# Patient Record
Sex: FEMALE | Race: WHITE | NOT HISPANIC OR LATINO | URBAN - METROPOLITAN AREA
[De-identification: names, ages, dates, MRNs, and addresses within clinical notes are randomized per-mention and may not be internally consistent; named-entity substitution may affect disease eponyms.]

---

## 2017-09-15 ENCOUNTER — FOLLOW UP ESTABLISHED (OUTPATIENT)
Dept: URBAN - METROPOLITAN AREA CLINIC 51 | Facility: CLINIC | Age: 82
End: 2017-09-15
Payer: COMMERCIAL

## 2017-09-15 DIAGNOSIS — H43.393 OTHER VITREOUS OPACITIES, BILATERAL: ICD-10-CM

## 2017-09-15 DIAGNOSIS — H35.3131 NONEXUDATIVE MACULAR DEGENERATION, EARLY DRY STAGE, BILATERAL: ICD-10-CM

## 2017-09-15 PROCEDURE — 92014 COMPRE OPH EXAM EST PT 1/>: CPT | Performed by: OPTOMETRIST

## 2017-09-15 PROCEDURE — 92134 CPTRZ OPH DX IMG PST SGM RTA: CPT | Performed by: OPTOMETRIST

## 2017-09-15 ASSESSMENT — VISUAL ACUITY
OS: 20/20
OD: 20/50

## 2017-09-15 ASSESSMENT — KERATOMETRY
OS: 44.01
OD: 43.92

## 2017-09-15 ASSESSMENT — INTRAOCULAR PRESSURE
OD: 15
OS: 17

## 2017-10-25 ENCOUNTER — Encounter (OUTPATIENT)
Dept: URBAN - METROPOLITAN AREA CLINIC 51 | Facility: CLINIC | Age: 82
End: 2017-10-25
Payer: COMMERCIAL

## 2017-10-25 DIAGNOSIS — Z01.818 ENCOUNTER FOR OTHER PREPROCEDURAL EXAMINATION: Primary | ICD-10-CM

## 2017-10-25 DIAGNOSIS — H26.491 OTHER SECONDARY CATARACT, RIGHT EYE: ICD-10-CM

## 2017-10-25 PROCEDURE — 99213 OFFICE O/P EST LOW 20 MIN: CPT | Performed by: PHYSICIAN ASSISTANT

## 2017-10-31 ENCOUNTER — SURGERY (OUTPATIENT)
Dept: URBAN - METROPOLITAN AREA SURGERY 19 | Facility: SURGERY | Age: 82
End: 2017-10-31
Payer: COMMERCIAL

## 2017-10-31 PROCEDURE — 66821 AFTER CATARACT LASER SURGERY: CPT | Performed by: OPHTHALMOLOGY

## 2022-11-28 ENCOUNTER — INPATIENT (INPATIENT)
Facility: HOSPITAL | Age: 87
LOS: 1 days | Discharge: HOME | DRG: 683 | End: 2022-11-30
Attending: HOSPITALIST | Admitting: HOSPITALIST
Payer: MEDICARE

## 2022-11-28 VITALS
HEART RATE: 86 BPM | WEIGHT: 115.96 LBS | TEMPERATURE: 97 F | DIASTOLIC BLOOD PRESSURE: 52 MMHG | RESPIRATION RATE: 16 BRPM | SYSTOLIC BLOOD PRESSURE: 114 MMHG | OXYGEN SATURATION: 95 %

## 2022-11-28 DIAGNOSIS — N17.9 ACUTE KIDNEY FAILURE, UNSPECIFIED: ICD-10-CM

## 2022-11-28 LAB
ALBUMIN SERPL ELPH-MCNC: 2.7 G/DL — LOW (ref 3.3–5)
ALP SERPL-CCNC: 78 U/L — SIGNIFICANT CHANGE UP (ref 40–120)
ALT FLD-CCNC: <6 U/L — LOW (ref 10–45)
ANION GAP SERPL CALC-SCNC: 8 MMOL/L — SIGNIFICANT CHANGE UP (ref 5–17)
APPEARANCE UR: ABNORMAL
APTT BLD: 31.9 SEC — SIGNIFICANT CHANGE UP (ref 27.5–35.5)
AST SERPL-CCNC: 14 U/L — SIGNIFICANT CHANGE UP (ref 10–40)
BACTERIA # UR AUTO: ABNORMAL /HPF
BASOPHILS # BLD AUTO: 0.02 K/UL — SIGNIFICANT CHANGE UP (ref 0–0.2)
BASOPHILS NFR BLD AUTO: 0.2 % — SIGNIFICANT CHANGE UP (ref 0–2)
BILIRUB SERPL-MCNC: 0.4 MG/DL — SIGNIFICANT CHANGE UP (ref 0.2–1.2)
BILIRUB UR-MCNC: NEGATIVE — SIGNIFICANT CHANGE UP
BUN SERPL-MCNC: 76 MG/DL — HIGH (ref 7–23)
CALCIUM SERPL-MCNC: 10.4 MG/DL — SIGNIFICANT CHANGE UP (ref 8.4–10.5)
CHLORIDE SERPL-SCNC: 98 MMOL/L — SIGNIFICANT CHANGE UP (ref 96–108)
CO2 SERPL-SCNC: 32 MMOL/L — HIGH (ref 22–31)
COLOR SPEC: YELLOW — SIGNIFICANT CHANGE UP
CREAT SERPL-MCNC: 1.61 MG/DL — HIGH (ref 0.5–1.3)
DIFF PNL FLD: ABNORMAL
EGFR: 31 ML/MIN/1.73M2 — LOW
EOSINOPHIL # BLD AUTO: 0.05 K/UL — SIGNIFICANT CHANGE UP (ref 0–0.5)
EOSINOPHIL NFR BLD AUTO: 0.5 % — SIGNIFICANT CHANGE UP (ref 0–6)
EPI CELLS # UR: SIGNIFICANT CHANGE UP
GLUCOSE SERPL-MCNC: 109 MG/DL — HIGH (ref 70–99)
GLUCOSE UR QL: NEGATIVE — SIGNIFICANT CHANGE UP
HCT VFR BLD CALC: 29 % — LOW (ref 34.5–45)
HGB BLD-MCNC: 8.6 G/DL — LOW (ref 11.5–15.5)
IMM GRANULOCYTES NFR BLD AUTO: 0.9 % — SIGNIFICANT CHANGE UP (ref 0–0.9)
INR BLD: 1.69 RATIO — HIGH (ref 0.88–1.16)
KETONES UR-MCNC: NEGATIVE — SIGNIFICANT CHANGE UP
LACTATE SERPL-SCNC: 0.9 MMOL/L — SIGNIFICANT CHANGE UP (ref 0.7–2)
LEUKOCYTE ESTERASE UR-ACNC: ABNORMAL
LYMPHOCYTES # BLD AUTO: 1.41 K/UL — SIGNIFICANT CHANGE UP (ref 1–3.3)
LYMPHOCYTES # BLD AUTO: 14.6 % — SIGNIFICANT CHANGE UP (ref 13–44)
MCHC RBC-ENTMCNC: 27.4 PG — SIGNIFICANT CHANGE UP (ref 27–34)
MCHC RBC-ENTMCNC: 29.7 GM/DL — LOW (ref 32–36)
MCV RBC AUTO: 92.4 FL — SIGNIFICANT CHANGE UP (ref 80–100)
MONOCYTES # BLD AUTO: 0.58 K/UL — SIGNIFICANT CHANGE UP (ref 0–0.9)
MONOCYTES NFR BLD AUTO: 6 % — SIGNIFICANT CHANGE UP (ref 2–14)
NEUTROPHILS # BLD AUTO: 7.52 K/UL — HIGH (ref 1.8–7.4)
NEUTROPHILS NFR BLD AUTO: 77.8 % — HIGH (ref 43–77)
NITRITE UR-MCNC: NEGATIVE — SIGNIFICANT CHANGE UP
NRBC # BLD: 0 /100 WBCS — SIGNIFICANT CHANGE UP (ref 0–0)
OB PNL STL: NEGATIVE — SIGNIFICANT CHANGE UP
PH UR: 7 — SIGNIFICANT CHANGE UP (ref 5–8)
PLATELET # BLD AUTO: 292 K/UL — SIGNIFICANT CHANGE UP (ref 150–400)
POTASSIUM SERPL-MCNC: 3.2 MMOL/L — LOW (ref 3.5–5.3)
POTASSIUM SERPL-SCNC: 3.2 MMOL/L — LOW (ref 3.5–5.3)
PROT SERPL-MCNC: 7.1 G/DL — SIGNIFICANT CHANGE UP (ref 6–8.3)
PROT UR-MCNC: 30 MG/DL
PROTHROM AB SERPL-ACNC: 19.7 SEC — HIGH (ref 10.5–13.4)
RBC # BLD: 3.14 M/UL — LOW (ref 3.8–5.2)
RBC # FLD: 15.9 % — HIGH (ref 10.3–14.5)
RBC CASTS # UR COMP ASSIST: ABNORMAL /HPF (ref 0–4)
SARS-COV-2 RNA SPEC QL NAA+PROBE: SIGNIFICANT CHANGE UP
SODIUM SERPL-SCNC: 138 MMOL/L — SIGNIFICANT CHANGE UP (ref 135–145)
SP GR SPEC: 1 — LOW (ref 1.01–1.02)
UROBILINOGEN FLD QL: NEGATIVE — SIGNIFICANT CHANGE UP
WBC # BLD: 9.67 K/UL — SIGNIFICANT CHANGE UP (ref 3.8–10.5)
WBC # FLD AUTO: 9.67 K/UL — SIGNIFICANT CHANGE UP (ref 3.8–10.5)
WBC UR QL: ABNORMAL /HPF (ref 0–5)

## 2022-11-28 PROCEDURE — 99285 EMERGENCY DEPT VISIT HI MDM: CPT

## 2022-11-28 PROCEDURE — 73630 X-RAY EXAM OF FOOT: CPT | Mod: 26,RT

## 2022-11-28 PROCEDURE — 71045 X-RAY EXAM CHEST 1 VIEW: CPT | Mod: 26

## 2022-11-28 PROCEDURE — 93010 ELECTROCARDIOGRAM REPORT: CPT

## 2022-11-28 PROCEDURE — 99223 1ST HOSP IP/OBS HIGH 75: CPT

## 2022-11-28 RX ORDER — ONDANSETRON 8 MG/1
4 TABLET, FILM COATED ORAL EVERY 8 HOURS
Refills: 0 | Status: DISCONTINUED | OUTPATIENT
Start: 2022-11-28 | End: 2022-11-30

## 2022-11-28 RX ORDER — ACETAMINOPHEN 500 MG
650 TABLET ORAL EVERY 6 HOURS
Refills: 0 | Status: DISCONTINUED | OUTPATIENT
Start: 2022-11-28 | End: 2022-11-30

## 2022-11-28 RX ORDER — ALLOPURINOL 300 MG
0 TABLET ORAL
Qty: 0 | Refills: 0 | DISCHARGE

## 2022-11-28 RX ORDER — VANCOMYCIN HCL 1 G
1000 VIAL (EA) INTRAVENOUS ONCE
Refills: 0 | Status: COMPLETED | OUTPATIENT
Start: 2022-11-28 | End: 2022-11-28

## 2022-11-28 RX ORDER — VANCOMYCIN HCL 1 G
500 VIAL (EA) INTRAVENOUS EVERY 24 HOURS
Refills: 0 | Status: DISCONTINUED | OUTPATIENT
Start: 2022-11-28 | End: 2022-11-29

## 2022-11-28 RX ORDER — METOLAZONE 5 MG/1
1 TABLET ORAL
Qty: 0 | Refills: 0 | DISCHARGE

## 2022-11-28 RX ORDER — POLYETHYLENE GLYCOL 3350 17 G/17G
17 POWDER, FOR SOLUTION ORAL DAILY
Refills: 0 | Status: DISCONTINUED | OUTPATIENT
Start: 2022-11-28 | End: 2022-11-30

## 2022-11-28 RX ORDER — LANOLIN ALCOHOL/MO/W.PET/CERES
3 CREAM (GRAM) TOPICAL AT BEDTIME
Refills: 0 | Status: DISCONTINUED | OUTPATIENT
Start: 2022-11-28 | End: 2022-11-30

## 2022-11-28 RX ORDER — ATENOLOL 25 MG/1
50 TABLET ORAL DAILY
Refills: 0 | Status: DISCONTINUED | OUTPATIENT
Start: 2022-11-28 | End: 2022-11-30

## 2022-11-28 RX ORDER — POTASSIUM CHLORIDE 20 MEQ
0 PACKET (EA) ORAL
Qty: 0 | Refills: 0 | DISCHARGE

## 2022-11-28 RX ORDER — ZINC SULFATE TAB 220 MG (50 MG ZINC EQUIVALENT) 220 (50 ZN) MG
220 TAB ORAL DAILY
Refills: 0 | Status: DISCONTINUED | OUTPATIENT
Start: 2022-11-28 | End: 2022-11-30

## 2022-11-28 RX ORDER — CEFEPIME 1 G/1
500 INJECTION, POWDER, FOR SOLUTION INTRAMUSCULAR; INTRAVENOUS EVERY 12 HOURS
Refills: 0 | Status: DISCONTINUED | OUTPATIENT
Start: 2022-11-28 | End: 2022-11-30

## 2022-11-28 RX ORDER — POTASSIUM CHLORIDE 20 MEQ
40 PACKET (EA) ORAL ONCE
Refills: 0 | Status: COMPLETED | OUTPATIENT
Start: 2022-11-28 | End: 2022-11-28

## 2022-11-28 RX ORDER — APIXABAN 2.5 MG/1
2.5 TABLET, FILM COATED ORAL
Refills: 0 | Status: DISCONTINUED | OUTPATIENT
Start: 2022-11-28 | End: 2022-11-30

## 2022-11-28 RX ORDER — ASCORBIC ACID 60 MG
500 TABLET,CHEWABLE ORAL DAILY
Refills: 0 | Status: DISCONTINUED | OUTPATIENT
Start: 2022-11-28 | End: 2022-11-30

## 2022-11-28 RX ORDER — FUROSEMIDE 40 MG
1 TABLET ORAL
Qty: 0 | Refills: 0 | DISCHARGE

## 2022-11-28 RX ORDER — DOCUSATE SODIUM 100 MG
1 CAPSULE ORAL
Qty: 0 | Refills: 0 | DISCHARGE

## 2022-11-28 RX ORDER — SODIUM CHLORIDE 9 MG/ML
1500 INJECTION INTRAMUSCULAR; INTRAVENOUS; SUBCUTANEOUS ONCE
Refills: 0 | Status: COMPLETED | OUTPATIENT
Start: 2022-11-28 | End: 2022-11-28

## 2022-11-28 RX ORDER — CEFEPIME 1 G/1
2000 INJECTION, POWDER, FOR SOLUTION INTRAMUSCULAR; INTRAVENOUS ONCE
Refills: 0 | Status: COMPLETED | OUTPATIENT
Start: 2022-11-28 | End: 2022-11-28

## 2022-11-28 RX ORDER — BUDESONIDE AND FORMOTEROL FUMARATE DIHYDRATE 160; 4.5 UG/1; UG/1
2 AEROSOL RESPIRATORY (INHALATION)
Refills: 0 | Status: DISCONTINUED | OUTPATIENT
Start: 2022-11-28 | End: 2022-11-30

## 2022-11-28 RX ORDER — ALLOPURINOL 300 MG
100 TABLET ORAL DAILY
Refills: 0 | Status: DISCONTINUED | OUTPATIENT
Start: 2022-11-28 | End: 2022-11-30

## 2022-11-28 RX ORDER — FLECAINIDE ACETATE 50 MG
50 TABLET ORAL EVERY 12 HOURS
Refills: 0 | Status: DISCONTINUED | OUTPATIENT
Start: 2022-11-28 | End: 2022-11-30

## 2022-11-28 RX ADMIN — SODIUM CHLORIDE 1500 MILLILITER(S): 9 INJECTION INTRAMUSCULAR; INTRAVENOUS; SUBCUTANEOUS at 15:03

## 2022-11-28 RX ADMIN — SODIUM CHLORIDE 1500 MILLILITER(S): 9 INJECTION INTRAMUSCULAR; INTRAVENOUS; SUBCUTANEOUS at 13:06

## 2022-11-28 RX ADMIN — CEFEPIME 2000 MILLIGRAM(S): 1 INJECTION, POWDER, FOR SOLUTION INTRAMUSCULAR; INTRAVENOUS at 13:15

## 2022-11-28 RX ADMIN — Medication 100 MILLIGRAM(S): at 22:36

## 2022-11-28 RX ADMIN — BUDESONIDE AND FORMOTEROL FUMARATE DIHYDRATE 2 PUFF(S): 160; 4.5 AEROSOL RESPIRATORY (INHALATION) at 22:35

## 2022-11-28 RX ADMIN — Medication 250 MILLIGRAM(S): at 14:10

## 2022-11-28 RX ADMIN — APIXABAN 2.5 MILLIGRAM(S): 2.5 TABLET, FILM COATED ORAL at 22:36

## 2022-11-28 RX ADMIN — Medication 50 MILLIGRAM(S): at 22:35

## 2022-11-28 RX ADMIN — CEFEPIME 100 MILLIGRAM(S): 1 INJECTION, POWDER, FOR SOLUTION INTRAMUSCULAR; INTRAVENOUS at 13:06

## 2022-11-28 RX ADMIN — Medication 40 MILLIEQUIVALENT(S): at 14:10

## 2022-11-28 NOTE — H&P ADULT - NSICDXPASTMEDICALHX_GEN_ALL_CORE_FT
PAST MEDICAL HISTORY:  Acute combined systolic and diastolic congestive heart failure     Atrial fibrillation     COPD without exacerbation     Gout     HTN (hypertension)     Pulmonary hypertension

## 2022-11-28 NOTE — H&P ADULT - ASSESSMENT
JEREMY baseline 0.87    arm; approx 3 x3cm to right medial malleolus/dorsum of foot; mild surrounding erythema; not stagable;    Called daughter Sandi Metzger on her cell at [phone] 86 yo female with PMH A Fib on eliquis, HTN, COPD, CHF, pulmonary HTN, dementia, and nonhealing right ankle wound sent to ED from [facility] for concern for sepsis due to nonhealing right ankle wound. Found to have JEREMY. Admitted for JEREMY and management of wound    #Nonhealing right medial malleolar wound  - Wound initially noted on 11/3/22 as per PMD  -         JEREMY baseline 0.87    arm;     Called daughter Sandi Metzger on her cell at [phone] 88 yo female with PMH A Fib on eliquis, HTN, COPD, CHF, pulmonary HTN, dementia, and nonhealing right ankle wound sent to ED from [facility] for concern for sepsis due to nonhealing right ankle wound. Found to have JEREMY. Admitted for JEREMY and management of wound    #Nonhealing right medial malleolar wound  - Wound initially noted on 11/3/22 as per PMD  - Continue vancomycin and cefepime for now  - f/u XRAY ankle  - f/u blood cultures  - Wound care nurse consult pending     #JEREMY  - Baseline Cr 0.87, now 1.61  - Received IVF in ED  - Repeat BMP in AM  - Hold diuretics for now  - Avoid nephrotoxins     #A Fib on Eliquis  #Hx CHF, hx Pulmonary HTN  - Continue home meds - flecainide and and atenolol  - Hold lasix and metolazone for now  - Continue eliquis for AC  - Check TTE    #Hypokalemia  - Supplemented  - Repeat in AM  - Resume daily supplement once diuretics resumed     #COPD  - No acute exacerbation, on room air  - On Breo-Ellipta, will give symbicort while admitted     #HTN  - Atenolol    #Gout  - Allopurinol    #Dementia  - Supportive care  - Frequent reorientation  - Daughter looking into hiring private aide to sit at bedside     #DVT ppx  - On eliquis    GOC: DNR/I    DIspo: Pending above    Updated HCP/daughter Sandi Metzger on admission - home phone preferred [phone]. Alternate cell # is [phone]   88 yo female with PMH A Fib on eliquis, HTN, COPD, CHF, pulmonary HTN, dementia, and nonhealing right ankle wound sent to ED from [facility] for concern for sepsis due to nonhealing right ankle wound. Found to have JEREMY. Admitted for JEREMY and management of wound    #Nonhealing right medial malleolar wound  - Wound initially noted on 11/3/22 as per PMD  - Continue vancomycin and cefepime for now  - f/u XRAY ankle  - f/u blood cultures  - Wound care nurse consult pending     #JEREMY  - Baseline Cr 0.87, now 1.61  - Received IVF in ED  - Repeat BMP in AM  - Hold diuretics for now  - Avoid nephrotoxins     #A Fib on Eliquis  #Hx CHF, hx Pulmonary HTN  - Continue home meds - flecainide and and atenolol  - Hold lasix and metolazone for now  - Continue eliquis for AC  - Check TTE    #Hypokalemia  - Supplemented  - Repeat in AM  - Resume daily supplement once diuretics resumed     #COPD  - No acute exacerbation, on room air  - On Breo-Ellipta, will give symbicort while admitted     #HTN  - Atenolol    #Gout  - Allopurinol    #Dementia  - Supportive care  - Frequent reorientation  - Daughter looking into hiring private aide to sit at bedside     #DVT ppx  - On eliquis    GOC: DNR/I    DIspo: Pending above    Updated HCP/daughter Sandi Meztger on admission - home phone preferred [phone]. Alternate cell # is [phone]    IMPROVE VTE Individual Risk Assessment          RISK                                                          Points  [  ] Previous VTE                                                3  [  ] Thrombophilia                                             2  [ x ] Lower limb paralysis                                   2        (unable to hold up >15 seconds)    [  ] Current Cancer                                             2         (within 6 months)  [  ] Immobilization > 24 hrs                              1  [  ] ICU/CCU stay > 24 hours                             1  [ x ] Age > 60                                                         1    IMPROVE VTE Score: 3

## 2022-11-28 NOTE — ED PROVIDER NOTE - OBJECTIVE STATEMENT
Patient is a 88 yo female with non healing right ankle/foot ulcer; sent to ED for fever at facility; no other complaints; no new trauma or injury; no weakness; hx of dementia; oriented to person/place in ED.

## 2022-11-28 NOTE — H&P ADULT - NSHPLABSRESULTS_GEN_ALL_CORE
8.6    9.67  )-----------( 292      ( 28 Nov 2022 11:43 )             29.0     11-28  138  |  98  |  76<H>  ----------------------------<  109<H>  3.2<L>   |  32<H>  |  1.61<H>    Ca    10.4      28 Nov 2022 11:43    TPro  7.1  /  Alb  2.7<L>  /  TBili  0.4  /  DBili  x   /  AST  14  /  ALT  <6<L>  /  AlkPhos  78  11-28  PT/INR - ( 28 Nov 2022 11:43 )   PT: 19.7 sec;   INR: 1.69 ratio       PTT - ( 28 Nov 2022 11:43 )  PTT:31.9 sec  Lactate Trend  11-28 @ 11:43 Lactate:0.9       CAPILLARY BLOOD GLUCOSE Spine appears normal, range of motion is not limited, no muscle or joint tenderness

## 2022-11-28 NOTE — ED PROVIDER NOTE - SKIN, MLM
Skin normal color for race, warm; approx 3 x3cm to right medial malleolus/dorsum of foot; mild surrounding erythema; not stagable;

## 2022-11-28 NOTE — H&P ADULT - NS ATTEND AMEND GEN_ALL_CORE FT
87F from [facility] presents to  for a right ankle wound. Patient unable to participate in hx due to dementia. hx obtained from chart and other medical providers. A/O x 1, in no apparent distress. Right ankle wound is dry, with large central eschar, no drainage / discharge, no warmth, no significant erythema. Patient is afebrile. No leukocytosis. Does not appear infected. However, reason for inpatient admission is for dehydration / JEREMY, requiring IV fluids. Wound care RN consulted. Follow-up ankle x-ray. Monitor off antibiotics. c/w IVF for dehydration, will limit to 1L to avoid hypervolemic state. Hold nephrotoxic meds until Cr normalizes. DNR/DNI 87F from [facility] presents to  for a right ankle wound. Patient unable to participate in hx due to dementia. hx obtained from chart and other medical providers. A/O x 1, in no apparent distress. Right ankle wound is dry, with large central eschar, no drainage / discharge, no warmth, no significant erythema. Patient is afebrile. No leukocytosis. Does not appear infected. However, will c/w antibiotics purely empirically pending blood culture results (since reportedly had temp of 100 at [place] today)... LOW threshold to discontinue if cultures negative. However, primary reason for inpatient admission is for dehydration / JEREMY, requiring IV fluids. Wound care RN consulted. Follow-up ankle x-ray.  Patient got 1500 cc IVF in ED - will hold further IVF at this time - encourage PO, but hold diuretics until Cr normalizes. DNR/DNI 87F from [facility] presents to  for a right ankle wound. Patient unable to participate in hx due to dementia. hx obtained from chart and other medical providers. A/O x 1, in no apparent distress. Right ankle wound is dry, with large central eschar, no drainage / discharge, no warmth, no significant erythema. Patient is afebrile. No leukocytosis. Right ankle ulcer is unstageable, but does not appear acutely infected. Nevertheless, will c/w antibiotics purely empirically pending blood culture results since reportedly had temp of 100 at [facility] today... with a LOW threshold to discontinue if cultures negative. However, primary reason for inpatient admission is for dehydration / JEREMY, requiring IV fluids. Wound care RN consulted. Follow-up ankle x-ray.  Patient got 1500 cc IVF in ED - will hold further IVF at this time - encourage PO, but hold diuretics until Cr normalizes. DNR/DNI

## 2022-11-28 NOTE — H&P ADULT - HISTORY OF PRESENT ILLNESS
86 yo female with PMH A Fib on eliquis, HTN, COPD, CHF, pulmonary HTN, dementia, and nonhealing right ankle wound sent to ED from [facility] for concern for sepsis due to nonhealing right ankle wound. Was reportedly experiencing chills, temperature of 100, irregular HR, periods of apnea and overall change in appearance and so was sent to ED. In ED patient afebrile, no leukocytosis, lactate normal. CXR appears clear, awaiting final read. UA pending. Vitals normal apart from low diastolic BP. Patient currently resting in bed, she is oriented to self, otherwise pleasantly confused and unable to provide any history. She denies pain. Follows commands.

## 2022-11-28 NOTE — H&P ADULT - NSHPSOCIALHISTORY_GEN_ALL_CORE
Lives at [facility] Lives at [facility]    Unable to obtain SH/FH due to dementia

## 2022-11-28 NOTE — H&P ADULT - CONVERSATION DETAILS
Patient's daughter Sandi Metzger is HCP and POA, paperwork in chart. Living Will in chart, indicates that patient is DNR/I, spoke with patient's daughter Sandi to confirm, MOLST form completed over phone, DNR/I. Other sections deferred for now.

## 2022-11-28 NOTE — ED PROVIDER NOTE - CONSTITUTIONAL, MLM
normal... Well appearing, awake, alert, oriented to person, place, not time; and in no apparent distress.

## 2022-11-28 NOTE — H&P ADULT - NSHPPHYSICALEXAM_GEN_ALL_CORE
Spontaneous, unlabored and symmetrical Vital Signs Last 24 Hrs  T(F): 97 (28 Nov 2022 10:46), Max: 97 (28 Nov 2022 10:46)  HR: 73 (28 Nov 2022 13:00) (73 - 86)  BP: 120/38 (28 Nov 2022 13:00) (104/35 - 128/40)  RR: 17 (28 Nov 2022 13:00) (11 - 17)  SpO2: 99% (28 Nov 2022 13:00) (95% - 100%)    PHYSICAL EXAM:  GENERAL: NAD, elderly, pleasantly confused, thin   HEAD:  Atraumatic, Normocephalic  EYES: EOMI, conjunctiva and sclera clear  ENMT: Moist mucous membranes, poor dentition, no thrush  NECK: Supple, No JVD  CHEST/LUNG: Clear to auscultation bilaterally, good air entry, non-labored breathing  HEART: IRIR; S1/S2, No murmur  ABDOMEN: Soft, Nontender, Nondistended; Bowel sounds present  VASCULAR: Normal pulses, Normal capillary refill  EXTREMITIES: No calf tenderness, No cyanosis, No edema  SKIN: Warm, approx 3 x 3 cm unstageable ulceration to right medial malleolus with yellow eschar, mild surrounding erythema, no streaking up leg. DTI to right dorsum of foot  PSYCH: Calm, cooperative   NERVOUS SYSTEM:  A/O x1, No focal deficits

## 2022-11-29 LAB
ANION GAP SERPL CALC-SCNC: 5 MMOL/L — SIGNIFICANT CHANGE UP (ref 5–17)
BUN SERPL-MCNC: 59 MG/DL — HIGH (ref 7–23)
CALCIUM SERPL-MCNC: 9.5 MG/DL — SIGNIFICANT CHANGE UP (ref 8.4–10.5)
CHLORIDE SERPL-SCNC: 100 MMOL/L — SIGNIFICANT CHANGE UP (ref 96–108)
CO2 SERPL-SCNC: 35 MMOL/L — HIGH (ref 22–31)
CREAT SERPL-MCNC: 1.28 MG/DL — SIGNIFICANT CHANGE UP (ref 0.5–1.3)
EGFR: 41 ML/MIN/1.73M2 — LOW
GLUCOSE SERPL-MCNC: 118 MG/DL — HIGH (ref 70–99)
HCT VFR BLD CALC: 28.4 % — LOW (ref 34.5–45)
HGB BLD-MCNC: 8.3 G/DL — LOW (ref 11.5–15.5)
MCHC RBC-ENTMCNC: 26.9 PG — LOW (ref 27–34)
MCHC RBC-ENTMCNC: 29.2 GM/DL — LOW (ref 32–36)
MCV RBC AUTO: 92.2 FL — SIGNIFICANT CHANGE UP (ref 80–100)
NRBC # BLD: 0 /100 WBCS — SIGNIFICANT CHANGE UP (ref 0–0)
PLATELET # BLD AUTO: 292 K/UL — SIGNIFICANT CHANGE UP (ref 150–400)
POTASSIUM SERPL-MCNC: 3.1 MMOL/L — LOW (ref 3.5–5.3)
POTASSIUM SERPL-SCNC: 3.1 MMOL/L — LOW (ref 3.5–5.3)
RBC # BLD: 3.08 M/UL — LOW (ref 3.8–5.2)
RBC # FLD: 15.9 % — HIGH (ref 10.3–14.5)
SODIUM SERPL-SCNC: 140 MMOL/L — SIGNIFICANT CHANGE UP (ref 135–145)
WBC # BLD: 9.46 K/UL — SIGNIFICANT CHANGE UP (ref 3.8–10.5)
WBC # FLD AUTO: 9.46 K/UL — SIGNIFICANT CHANGE UP (ref 3.8–10.5)

## 2022-11-29 PROCEDURE — 99233 SBSQ HOSP IP/OBS HIGH 50: CPT

## 2022-11-29 PROCEDURE — 93306 TTE W/DOPPLER COMPLETE: CPT | Mod: 26

## 2022-11-29 RX ORDER — POTASSIUM CHLORIDE 20 MEQ
40 PACKET (EA) ORAL EVERY 4 HOURS
Refills: 0 | Status: COMPLETED | OUTPATIENT
Start: 2022-11-29 | End: 2022-11-29

## 2022-11-29 RX ADMIN — APIXABAN 2.5 MILLIGRAM(S): 2.5 TABLET, FILM COATED ORAL at 06:22

## 2022-11-29 RX ADMIN — Medication 500 MILLIGRAM(S): at 12:21

## 2022-11-29 RX ADMIN — Medication 50 MILLIGRAM(S): at 20:36

## 2022-11-29 RX ADMIN — Medication 1 TABLET(S): at 12:17

## 2022-11-29 RX ADMIN — Medication 100 MILLIGRAM(S): at 12:17

## 2022-11-29 RX ADMIN — CEFEPIME 100 MILLIGRAM(S): 1 INJECTION, POWDER, FOR SOLUTION INTRAMUSCULAR; INTRAVENOUS at 06:21

## 2022-11-29 RX ADMIN — Medication 50 MILLIGRAM(S): at 06:21

## 2022-11-29 RX ADMIN — BUDESONIDE AND FORMOTEROL FUMARATE DIHYDRATE 2 PUFF(S): 160; 4.5 AEROSOL RESPIRATORY (INHALATION) at 20:33

## 2022-11-29 RX ADMIN — APIXABAN 2.5 MILLIGRAM(S): 2.5 TABLET, FILM COATED ORAL at 20:36

## 2022-11-29 RX ADMIN — CEFEPIME 100 MILLIGRAM(S): 1 INJECTION, POWDER, FOR SOLUTION INTRAMUSCULAR; INTRAVENOUS at 20:36

## 2022-11-29 RX ADMIN — BUDESONIDE AND FORMOTEROL FUMARATE DIHYDRATE 2 PUFF(S): 160; 4.5 AEROSOL RESPIRATORY (INHALATION) at 09:10

## 2022-11-29 RX ADMIN — ATENOLOL 50 MILLIGRAM(S): 25 TABLET ORAL at 06:37

## 2022-11-29 RX ADMIN — POLYETHYLENE GLYCOL 3350 17 GRAM(S): 17 POWDER, FOR SOLUTION ORAL at 12:26

## 2022-11-29 RX ADMIN — ZINC SULFATE TAB 220 MG (50 MG ZINC EQUIVALENT) 220 MILLIGRAM(S): 220 (50 ZN) TAB at 12:17

## 2022-11-29 RX ADMIN — CEFEPIME 100 MILLIGRAM(S): 1 INJECTION, POWDER, FOR SOLUTION INTRAMUSCULAR; INTRAVENOUS at 00:15

## 2022-11-29 RX ADMIN — Medication 40 MILLIEQUIVALENT(S): at 20:35

## 2022-11-29 RX ADMIN — Medication 3 MILLIGRAM(S): at 01:16

## 2022-11-29 NOTE — PHYSICAL THERAPY INITIAL EVALUATION ADULT - RANGE OF MOTION EXAMINATION, REHAB EVAL
right LE contracture, bilat ankle ext contracture, ue's wfl arom
What Type Of Note Output Would You Prefer (Optional)?: Bullet Format
How Severe Is Your Skin Lesion?: mild
Has Your Skin Lesion Been Treated?: not been treated
Is This A New Presentation, Or A Follow-Up?: Moles

## 2022-11-29 NOTE — PROGRESS NOTE ADULT - ASSESSMENT
87F with AF on Eliquis, HTN, COPD, CHF (unknown EF), pulmonary HTN, dementia, and nonhealing right ankle wound sent to ED from [facility] for "concern for sepsis" due to nonhealing right ankle wound. Found to have JEREMY. Admitted for JEREMY and management of wound    #Nonhealing right medial malleolar wound, unstageable   - Wound does not look infectious - there is no evidence of "sepsis" on this admission  - Wound initially noted on 11/3/22 as per PMD  - Will stop Cefepime, and will c/w Vanco only ... but this is simply empiric, due to reported temp of 100 at [place].... pending negative blood cultures - if cultures are negative, would stop all antibiotics   - f/u blood cultures  - Wound care nurse consult pending - Hanny Swann notified     #JEREMY  - Improved s/p IVF  - Holding diuretics for now   - Repeat BMP in AM  - Avoid nephrotoxins     #Chronic A-Fib on Eliquis  #Hx chronic CHF, hx Pulmonary HTN  - Unknown EF - will check echo  - Continue home meds - flecainide and atenolol  - Hold lasix and metolazone for now  - Continue eliquis for AC  - Check TTE, routine, to assess EF    #Hypokalemia  - replete  - Repeat in AM  - Resume daily supplement once diuretics resumed   - check Mg in AM    #COPD without exacerbation   - On Breo-Ellipta, will give symbicort while admitted     #HTN  - Atenolol    #Gout  - Allopurinol    #Dementia  - Supportive care  - Frequent reorientation    #DVT ppx  - On eliquis    GOC: DNR/DNI    DIspo: Pending above. If blood cultures are negative, and if wound care assessment deems topical therapy is the treatment of choice, can possible d/c in 24 hours                Updated HCP/daughter Sandi Metzger on admission - home phone preferred [phone]. Alternate cell # is [phone]   87F with AF on Eliquis, HTN, COPD, CHF (unknown EF), pulmonary HTN, dementia, and nonhealing right ankle wound sent to ED from [facility] for "concern for sepsis" due to nonhealing right ankle wound. Found to have JEREMY. Admitted for JEREMY and management of wound    #Nonhealing right medial malleolar wound, unstageable   #Possible UTI  - Wound does not look infectious - there is no evidence of "sepsis" on this admission  - Wound initially noted on 11/3/22 as per PMD  - will stop Vanco, but will continue with Cefepime (see below) ... but this is simply empiric, due to reported temp of 100 at [place].... pending negative blood cultures - if cultures are negative, would stop all antibiotics   - Patient also with possible UTI ... cannot obtain reliable history ... and urine cultures were not sent on admission... so for this reason, will c/w the Cefepime , and stop after day 3, which would be tomorrow  - f/u blood cultures  - Wound care nurse consult pending - Hanny Swann notified     #JEREMY  - Improved s/p IVF  - Holding diuretics for now   - Repeat BMP in AM  - Avoid nephrotoxins     #Chronic A-Fib on Eliquis  #Hx chronic CHF, hx Pulmonary HTN  - Unknown EF - will check echo  - Continue home meds - flecainide and atenolol  - Hold lasix and metolazone for now  - Continue eliquis for AC  - Check TTE, routine, to assess EF    #Hypokalemia  - replete  - Repeat in AM  - Resume daily supplement once diuretics resumed   - check Mg in AM    #COPD without exacerbation   - On Breo-Ellipta, will give symbicort while admitted     #HTN  - Atenolol    #Gout  - Allopurinol    #Dementia  - Supportive care  - Frequent reorientation    #DVT ppx  - On eliquis    GOC: DNR/DNI    DIspo: Pending above. If blood cultures are negative, and if wound care assessment deems topical therapy is the treatment of choice, can possible d/c in 24 hours                Updated HCP/daughter Sandi Metzger on admission - home phone preferred [phone]. Alternate cell # is [phone]   87F with AF on Eliquis, HTN, COPD, CHF (unknown EF), pulmonary HTN, dementia, and nonhealing right ankle wound sent to ED from [facility] for "concern for sepsis" due to nonhealing right ankle wound. Found to have JEREMY. Admitted for JEREMY and management of wound    #Nonhealing right medial malleolar wound, unstageable   #Possible UTI  - Wound does not look infectious - there is no evidence of "sepsis" on this admission  - Wound initially noted on 11/3/22 as per PMD  - will stop Vanco, but will continue with Cefepime (see below) ... but this is simply empiric, due to reported temp of 100 at [place].... pending negative blood cultures - if cultures are negative, would stop all antibiotics   - Patient also with possible UTI ... cannot obtain reliable history ... and urine cultures were not sent on admission... so for this reason, will c/w the Cefepime , and stop after day 3, which would be tomorrow  - f/u blood cultures  - Wound care nurse consult pending - Hanny Swann notified     #JEREMY  - Improved s/p IVF  - Holding diuretics for now   - Repeat BMP in AM  - Avoid nephrotoxins     #Chronic A-Fib on Eliquis  #Hx chronic CHF, hx Pulmonary HTN  - Unknown EF - will check echo  - Continue home meds - flecainide and atenolol  - Hold lasix and metolazone for now  - Continue eliquis for AC  - Check TTE, routine, to assess EF    #Hypokalemia  - replete  - Repeat in AM  - Resume daily supplement once diuretics resumed   - check Mg in AM    #COPD without exacerbation   - On Breo-Ellipta, will give symbicort while admitted     #HTN  - Atenolol    #Gout  - Allopurinol    #Dementia  - Supportive care  - Frequent reorientation    #DVT ppx  - On eliquis    GOC: DNR/DNI    DIspo: Pending above. If blood cultures are negative, and if wound care assessment deems topical therapy is the treatment of choice, can possible d/c in 24 hours    Updated HCP/daughter Sandi Metzger on admission - home phone preferred [phone]. Alternate cell # is [phone]

## 2022-11-29 NOTE — PHYSICAL THERAPY INITIAL EVALUATION ADULT - PERTINENT HX OF CURRENT PROBLEM, REHAB EVAL
88 yo female with PMH A Fib on eliquis, HTN, COPD, CHF, pulmonary HTN, dementia, and nonhealing right ankle wound sent to ED from [facility] for concern for sepsis due to nonhealing right ankle wound. Was reportedly experiencing chills, temperature of 100, irregular HR, periods of apnea and overall change in appearance and so was sent to ED.

## 2022-11-29 NOTE — ADVANCED PRACTICE NURSE CONSULT - RECOMMEDATIONS
Suggest paint right medial malleolus wound with Betadine daily, allow to dry.   Cover with dry gauze (no foam), wrap with tracy.  Right medial malleolus & right great toe must be offloaded from left leg at all times, with pillow or offloading booties.  Offload all bony prominences with strict Turn & Position every 2 hours.  Nutritional support per Dietician's recommendations.  Consider Podiatry consult.

## 2022-11-29 NOTE — PROGRESS NOTE ADULT - SUBJECTIVE AND OBJECTIVE BOX
Patient is a 87y old  Female who presents with a chief complaint of JEREMY, foot wound (2022 13:57)    Patient seen and examined at bedside. No overnight events reported.     ALLERGIES:  lisinopril (Unknown)  penicillin (Unknown)  Pineapple (Unknown)    MEDICATIONS  (STANDING):  allopurinol 100 milliGRAM(s) Oral daily  apixaban 2.5 milliGRAM(s) Oral two times a day  ascorbic acid 500 milliGRAM(s) Oral daily  atenolol  Tablet 50 milliGRAM(s) Oral daily  budesonide  80 MICROgram(s)/formoterol 4.5 MICROgram(s) Inhaler 2 Puff(s) Inhalation two times a day  calcium carbonate 1250 mG  + Vitamin D (OsCal 500 + D) 1 Tablet(s) Oral daily  cefepime   IVPB 500 milliGRAM(s) IV Intermittent every 12 hours  flecainide 50 milliGRAM(s) Oral every 12 hours  polyethylene glycol 3350 17 Gram(s) Oral daily  potassium chloride   Solution 40 milliEquivalent(s) Oral every 4 hours  vancomycin  IVPB 500 milliGRAM(s) IV Intermittent every 24 hours  zinc sulfate 220 milliGRAM(s) Oral daily    MEDICATIONS  (PRN):  acetaminophen     Tablet .. 650 milliGRAM(s) Oral every 6 hours PRN Temp greater or equal to 38C (100.4F), Mild Pain (1 - 3)  aluminum hydroxide/magnesium hydroxide/simethicone Suspension 30 milliLiter(s) Oral every 4 hours PRN Dyspepsia  melatonin 3 milliGRAM(s) Oral at bedtime PRN Insomnia  ondansetron Injectable 4 milliGRAM(s) IV Push every 8 hours PRN Nausea and/or Vomiting    Vital Signs Last 24 Hrs  T(F): 97.5 (2022 06:28), Max: 97.5 (2022 06:28)  HR: 71 (2022 06:28) (71 - 86)  BP: 150/53 (2022 06:28) (104/35 - 150/53)  RR: 18 (2022 06:28) (11 - 18)  SpO2: 97% (2022 06:28) (95% - 100%)  I&O's Summary    PHYSICAL EXAM:        LABS:                        8.3    9.46  )-----------( 292      ( 2022 07:48 )             28.4         140  |  100  |  59  ----------------------------<  118  3.1   |  35  |  1.28    Ca    9.5      2022 07:48    TPro  7.1  /  Alb  2.7  /  TBili  0.4  /  DBili  x   /  AST  14  /  ALT  <6  /  AlkPhos  78            PT/INR - ( 2022 11:43 )   PT: 19.7 sec;   INR: 1.69 ratio         PTT - ( 2022 11:43 )  PTT:31.9 sec  Lactate, Blood: 0.9 mmol/L ( @ 11:43)      Urinalysis Basic - ( 2022 19:44 )    Color: Yellow / Appearance: very cloudy / S.005 / pH: x  Gluc: x / Ketone: Negative  / Bili: Negative / Urobili: Negative   Blood: x / Protein: 30 mg/dL / Nitrite: Negative   Leuk Esterase: Moderate / RBC: 5-10 /HPF / WBC 6-10 /HPF   Sq Epi: x / Non Sq Epi: Neg.-Few / Bacteria: Many /HPF        COVID-19 PCR: NotDetec (22 @ 11:43)     Patient is a 87y old  Female who presents with a chief complaint of JEREMY, foot wound (2022 13:57)    Patient seen and examined at bedside. No overnight events reported.     ALLERGIES:  lisinopril (Unknown)  penicillin (Unknown)  Pineapple (Unknown)    MEDICATIONS  (STANDING):  allopurinol 100 milliGRAM(s) Oral daily  apixaban 2.5 milliGRAM(s) Oral two times a day  ascorbic acid 500 milliGRAM(s) Oral daily  atenolol  Tablet 50 milliGRAM(s) Oral daily  budesonide  80 MICROgram(s)/formoterol 4.5 MICROgram(s) Inhaler 2 Puff(s) Inhalation two times a day  calcium carbonate 1250 mG  + Vitamin D (OsCal 500 + D) 1 Tablet(s) Oral daily  cefepime   IVPB 500 milliGRAM(s) IV Intermittent every 12 hours  flecainide 50 milliGRAM(s) Oral every 12 hours  polyethylene glycol 3350 17 Gram(s) Oral daily  potassium chloride   Solution 40 milliEquivalent(s) Oral every 4 hours  vancomycin  IVPB 500 milliGRAM(s) IV Intermittent every 24 hours  zinc sulfate 220 milliGRAM(s) Oral daily    MEDICATIONS  (PRN):  acetaminophen     Tablet .. 650 milliGRAM(s) Oral every 6 hours PRN Temp greater or equal to 38C (100.4F), Mild Pain (1 - 3)  aluminum hydroxide/magnesium hydroxide/simethicone Suspension 30 milliLiter(s) Oral every 4 hours PRN Dyspepsia  melatonin 3 milliGRAM(s) Oral at bedtime PRN Insomnia  ondansetron Injectable 4 milliGRAM(s) IV Push every 8 hours PRN Nausea and/or Vomiting    Vital Signs Last 24 Hrs  T(F): 97.5 (2022 06:28), Max: 97.5 (2022 06:28)  HR: 71 (2022 06:28) (71 - 86)  BP: 150/53 (2022 06:28) (104/35 - 150/53)  RR: 18 (2022 06:28) (11 - 18)  SpO2: 97% (2022 06:28) (95% - 100%)  I&O's Summary    PHYSICAL EXAM:  NAD, thin-habitus, A/O x 1  RRR, S1S2  CTA b/l limited to poor effort, non labored  BS+, NTTP  + skin tenting, DMM, no thrush  right ankle unstageable wound with eschar, "chronic" appearing, no drainage/discharge          LABS:                        8.3    9.46  )-----------( 292      ( 2022 07:48 )             28.4     11    140  |  100  |  59  ----------------------------<  118  3.1   |  35  |  1.28    Ca    9.5      2022 07:48    TPro  7.1  /  Alb  2.7  /  TBili  0.4  /  DBili  x   /  AST  14  /  ALT  <6  /  AlkPhos  78            PT/INR - ( 2022 11:43 )   PT: 19.7 sec;   INR: 1.69 ratio         PTT - ( 2022 11:43 )  PTT:31.9 sec  Lactate, Blood: 0.9 mmol/L ( @ 11:43)      Urinalysis Basic - ( 2022 19:44 )    Color: Yellow / Appearance: very cloudy / S.005 / pH: x  Gluc: x / Ketone: Negative  / Bili: Negative / Urobili: Negative   Blood: x / Protein: 30 mg/dL / Nitrite: Negative   Leuk Esterase: Moderate / RBC: 5-10 /HPF / WBC 6-10 /HPF   Sq Epi: x / Non Sq Epi: Neg.-Few / Bacteria: Many /HPF        COVID-19 PCR: Alessia (22 @ 11:43)

## 2022-11-29 NOTE — ADVANCED PRACTICE NURSE CONSULT - ASSESSMENT
Patient seen & examined in ED Hold, elderly female, lethargic but arousable to voice.  Patient lying to left side, legs crossed, ?contracted, ?possible internal rotation of left leg; bilaterally toes with bony deformities.  Pedal pulses palpable bilaterally, feet warm, with somewhat thickened toenails.   Presents with unstageable pressure injury to right medial malleolus; where it lays over left leg.  Wound is 5 x 3.5 cm, 100% covered with dry, stable, light brown to brown eschar, slight yellow crusting noted at periphery.  No drainage or separation from skin noted, no fluctuance, edema or warmth noted.  Very slight periwound erythema to approx 1 cm around.  Right medial great toe with very small DTI, 1.5 x 0.7 cm purplish discoloration of skin, again where toe lays on left leg.

## 2022-11-29 NOTE — PHYSICAL THERAPY INITIAL EVALUATION ADULT - ADDITIONAL COMMENTS
pt is a poor informant 2/2 dementia, hx per dtr. pt is non ambulatory, wheelchair bound at [facility]. Staff assists w/all ADL's

## 2022-11-29 NOTE — PHARMACOTHERAPY INTERVENTION NOTE - COMMENTS
Modified penicillin allergy to state patient tolerated cefepime during this admission.    Jay Jay Tom, PharmD  Clinical Pharmacy Specialist, Infectious Diseases  Tele-Antimicrobial Stewardship Program (Tele-ASP)  Tele-ASP Phone: [phone]

## 2022-11-30 VITALS
DIASTOLIC BLOOD PRESSURE: 50 MMHG | SYSTOLIC BLOOD PRESSURE: 148 MMHG | OXYGEN SATURATION: 97 % | HEART RATE: 68 BPM | TEMPERATURE: 98 F

## 2022-11-30 LAB
ANION GAP SERPL CALC-SCNC: 8 MMOL/L — SIGNIFICANT CHANGE UP (ref 5–17)
BUN SERPL-MCNC: 49 MG/DL — HIGH (ref 7–23)
CALCIUM SERPL-MCNC: 9.9 MG/DL — SIGNIFICANT CHANGE UP (ref 8.4–10.5)
CHLORIDE SERPL-SCNC: 104 MMOL/L — SIGNIFICANT CHANGE UP (ref 96–108)
CO2 SERPL-SCNC: 32 MMOL/L — HIGH (ref 22–31)
CREAT SERPL-MCNC: 1.2 MG/DL — SIGNIFICANT CHANGE UP (ref 0.5–1.3)
EGFR: 44 ML/MIN/1.73M2 — LOW
GLUCOSE SERPL-MCNC: 118 MG/DL — HIGH (ref 70–99)
MAGNESIUM SERPL-MCNC: 2.1 MG/DL — SIGNIFICANT CHANGE UP (ref 1.6–2.6)
POTASSIUM SERPL-MCNC: 4.7 MMOL/L — SIGNIFICANT CHANGE UP (ref 3.5–5.3)
POTASSIUM SERPL-SCNC: 4.7 MMOL/L — SIGNIFICANT CHANGE UP (ref 3.5–5.3)
SODIUM SERPL-SCNC: 144 MMOL/L — SIGNIFICANT CHANGE UP (ref 135–145)

## 2022-11-30 PROCEDURE — 96361 HYDRATE IV INFUSION ADD-ON: CPT

## 2022-11-30 PROCEDURE — 99239 HOSP IP/OBS DSCHRG MGMT >30: CPT

## 2022-11-30 PROCEDURE — 83735 ASSAY OF MAGNESIUM: CPT

## 2022-11-30 PROCEDURE — 83605 ASSAY OF LACTIC ACID: CPT

## 2022-11-30 PROCEDURE — 87635 SARS-COV-2 COVID-19 AMP PRB: CPT

## 2022-11-30 PROCEDURE — 85610 PROTHROMBIN TIME: CPT

## 2022-11-30 PROCEDURE — 82272 OCCULT BLD FECES 1-3 TESTS: CPT

## 2022-11-30 PROCEDURE — 96374 THER/PROPH/DIAG INJ IV PUSH: CPT

## 2022-11-30 PROCEDURE — 97162 PT EVAL MOD COMPLEX 30 MIN: CPT

## 2022-11-30 PROCEDURE — 73630 X-RAY EXAM OF FOOT: CPT

## 2022-11-30 PROCEDURE — 94640 AIRWAY INHALATION TREATMENT: CPT

## 2022-11-30 PROCEDURE — 80053 COMPREHEN METABOLIC PANEL: CPT

## 2022-11-30 PROCEDURE — 81001 URINALYSIS AUTO W/SCOPE: CPT

## 2022-11-30 PROCEDURE — 36415 COLL VENOUS BLD VENIPUNCTURE: CPT

## 2022-11-30 PROCEDURE — 85025 COMPLETE CBC W/AUTO DIFF WBC: CPT

## 2022-11-30 PROCEDURE — 99285 EMERGENCY DEPT VISIT HI MDM: CPT

## 2022-11-30 PROCEDURE — 85730 THROMBOPLASTIN TIME PARTIAL: CPT

## 2022-11-30 PROCEDURE — 85027 COMPLETE CBC AUTOMATED: CPT

## 2022-11-30 PROCEDURE — 93306 TTE W/DOPPLER COMPLETE: CPT

## 2022-11-30 PROCEDURE — 71045 X-RAY EXAM CHEST 1 VIEW: CPT

## 2022-11-30 PROCEDURE — 93005 ELECTROCARDIOGRAM TRACING: CPT

## 2022-11-30 PROCEDURE — 80048 BASIC METABOLIC PNL TOTAL CA: CPT

## 2022-11-30 PROCEDURE — G0378: CPT

## 2022-11-30 PROCEDURE — 87040 BLOOD CULTURE FOR BACTERIA: CPT

## 2022-11-30 RX ORDER — ACETAMINOPHEN 500 MG
2 TABLET ORAL
Qty: 0 | Refills: 0 | DISCHARGE
Start: 2022-11-30

## 2022-11-30 RX ORDER — METOLAZONE 5 MG/1
1 TABLET ORAL
Qty: 0 | Refills: 0 | DISCHARGE

## 2022-11-30 RX ORDER — OXYCODONE AND ACETAMINOPHEN 5; 325 MG/1; MG/1
1 TABLET ORAL
Qty: 0 | Refills: 0 | DISCHARGE

## 2022-11-30 RX ORDER — POTASSIUM CHLORIDE 20 MEQ
1 PACKET (EA) ORAL
Qty: 0 | Refills: 0 | DISCHARGE

## 2022-11-30 RX ORDER — FUROSEMIDE 40 MG
1 TABLET ORAL
Qty: 0 | Refills: 0 | DISCHARGE

## 2022-11-30 RX ADMIN — ATENOLOL 50 MILLIGRAM(S): 25 TABLET ORAL at 06:23

## 2022-11-30 RX ADMIN — Medication 40 MILLIEQUIVALENT(S): at 00:01

## 2022-11-30 RX ADMIN — BUDESONIDE AND FORMOTEROL FUMARATE DIHYDRATE 2 PUFF(S): 160; 4.5 AEROSOL RESPIRATORY (INHALATION) at 09:03

## 2022-11-30 RX ADMIN — CEFEPIME 100 MILLIGRAM(S): 1 INJECTION, POWDER, FOR SOLUTION INTRAMUSCULAR; INTRAVENOUS at 06:23

## 2022-11-30 RX ADMIN — Medication 100 MILLIGRAM(S): at 12:51

## 2022-11-30 RX ADMIN — ZINC SULFATE TAB 220 MG (50 MG ZINC EQUIVALENT) 220 MILLIGRAM(S): 220 (50 ZN) TAB at 12:51

## 2022-11-30 RX ADMIN — APIXABAN 2.5 MILLIGRAM(S): 2.5 TABLET, FILM COATED ORAL at 06:23

## 2022-11-30 RX ADMIN — Medication 50 MILLIGRAM(S): at 06:23

## 2022-11-30 RX ADMIN — Medication 500 MILLIGRAM(S): at 12:51

## 2022-11-30 RX ADMIN — Medication 1 TABLET(S): at 12:51

## 2022-11-30 NOTE — DISCHARGE NOTE PROVIDER - NSDCPNSUBOBJ_GEN_ALL_CORE
Patient seen and examined. More alert today    Interactive, pleasantly confused  A/O x 1  RRR S1S2  CTA b/l  NTTP BS+  Right medial malleolus wound unchanged    #Unstageable RIGHT medial malleolus wound - topical wound care as per Hanny Swann RN, outpatient podiatry consultation  #Dementia - stable, supportive care    Stable for d/c to [facility] assisted [facility] today. Sandi, daughter, in agreement with plan. Time spent on d/c 33 min.

## 2022-11-30 NOTE — DISCHARGE NOTE PROVIDER - NSDCCPCAREPLAN_GEN_ALL_CORE_FT
PRINCIPAL DISCHARGE DIAGNOSIS  Diagnosis: JEREMY (acute kidney injury)  Assessment and Plan of Treatment: You were admitted for dehydration. You were treated with IV fluids. We stopped your diuretics (Lasix and Metolazone). You may need to resume your lasix at a lower dose in the future - follow-up with your primary doctor, monitor your daily weights, and monitor for any signs of swelling or shortness of breath, which COULD mean you should resume Lasix. Your doctor will help with this      SECONDARY DISCHARGE DIAGNOSES  Diagnosis: Wound of skin  Assessment and Plan of Treatment: You were found to have a wound on your right medial malleoulus (inside of your foot). Continue wound care with Betadine topically once daily, and then allow for the Betadine to dry, then cover with Kostas. Podiatry consultation as outpatient.

## 2022-11-30 NOTE — DISCHARGE NOTE NURSING/CASE MANAGEMENT/SOCIAL WORK - NSDCPEFALRISK_GEN_ALL_CORE
For information on Fall & Injury Prevention, visit: [email or web address] OR  [email or web address] OR  https://www.cdc.gov/steadi/patient.html

## 2022-11-30 NOTE — DISCHARGE NOTE PROVIDER - NSDCMRMEDTOKEN_GEN_ALL_CORE_FT
acetaminophen 325 mg oral tablet: 2 tab(s) orally every 6 hours, As needed, Temp greater or equal to 38C (100.4F), Mild Pain (1 - 3)  allopurinol 100 mg oral tablet: 1  orally once a day  ascorbic acid 500 mg oral tablet: 1 tab(s) orally once a day  atenolol 50 mg oral tablet: 1 tab(s) orally once a day  Breo Ellipta 100 mcg-25 mcg/inh inhalation powder: 1 puff(s) inhaled once a day  Calcium 600+D 600 mg-5 mcg (200 intl units) oral tablet: 1 tab(s) orally 2 times a day  Colace 100 mg oral capsule: 1 cap(s) orally once a day  Eliquis 2.5 mg oral tablet: 1 tab(s) orally 2 times a day  flecainide 50 mg oral tablet: 1 tab(s) orally every 12 hours  MiraLax oral powder for reconstitution:   zinc sulfate 220 mg oral capsule: 1 cap(s) orally 3 times a day

## 2022-11-30 NOTE — DISCHARGE NOTE NURSING/CASE MANAGEMENT/SOCIAL WORK - PATIENT PORTAL LINK FT
You can access the FollowMyHealth Patient Portal offered by [facility] by registering at the following website: [email or web address]. By joining [facility] [employer]’s FollowMyHealth portal, you will also be able to view your health information using other applications (apps) compatible with our system.

## 2022-11-30 NOTE — DISCHARGE NOTE PROVIDER - CARE PROVIDER_API CALL
Brian Campos  [address], [address]  [place] [address]  Phone: [phone]  Fax: [phone]  Follow Up Time:     Rigo Saucedo (DPM)  Podiatric Medicine and Surgery  [address] [place] [address]  Phone: [phone]  Fax: [phone]  Follow Up Time:

## 2022-11-30 NOTE — DISCHARGE NOTE PROVIDER - HOSPITAL COURSE
87F with AF on Eliquis, HTN, COPD, CHFpEF, pulmonary HTN, dementia, and nonhealing right ankle wound sent to ED from [facility] for "concern for sepsis" due to nonhealing right ankle wound. Found to have JEREMY. Admitted for JEREMY and management of wound. JEREMY resolved with IVF and withholding diuretics. Wound care RN saw patient and recommended Betadine to wound. Will need outpatient podiatry follow-up. Possible UTI - treated with 3 days of antibiotics empirically. Daughter, Sandi, in agreement with plan.     Dr Campos - notified

## 2022-12-03 LAB
CULTURE RESULTS: SIGNIFICANT CHANGE UP
CULTURE RESULTS: SIGNIFICANT CHANGE UP
SPECIMEN SOURCE: SIGNIFICANT CHANGE UP
SPECIMEN SOURCE: SIGNIFICANT CHANGE UP

## 2022-12-15 ENCOUNTER — INPATIENT (INPATIENT)
Facility: HOSPITAL | Age: 87
LOS: 9 days | Discharge: HOSPICE | DRG: 377 | End: 2022-12-25
Attending: STUDENT IN AN ORGANIZED HEALTH CARE EDUCATION/TRAINING PROGRAM | Admitting: STUDENT IN AN ORGANIZED HEALTH CARE EDUCATION/TRAINING PROGRAM
Payer: MEDICARE

## 2022-12-15 VITALS
DIASTOLIC BLOOD PRESSURE: 68 MMHG | OXYGEN SATURATION: 93 % | HEART RATE: 79 BPM | RESPIRATION RATE: 18 BRPM | TEMPERATURE: 100 F | SYSTOLIC BLOOD PRESSURE: 159 MMHG

## 2022-12-15 DIAGNOSIS — Z98.890 OTHER SPECIFIED POSTPROCEDURAL STATES: Chronic | ICD-10-CM

## 2022-12-15 DIAGNOSIS — D64.9 ANEMIA, UNSPECIFIED: ICD-10-CM

## 2022-12-15 PROBLEM — I27.20 PULMONARY HYPERTENSION, UNSPECIFIED: Chronic | Status: ACTIVE | Noted: 2022-11-28

## 2022-12-15 PROBLEM — J44.9 CHRONIC OBSTRUCTIVE PULMONARY DISEASE, UNSPECIFIED: Chronic | Status: ACTIVE | Noted: 2022-11-28

## 2022-12-15 PROBLEM — I50.41 ACUTE COMBINED SYSTOLIC (CONGESTIVE) AND DIASTOLIC (CONGESTIVE) HEART FAILURE: Chronic | Status: ACTIVE | Noted: 2022-11-28

## 2022-12-15 PROBLEM — I10 ESSENTIAL (PRIMARY) HYPERTENSION: Chronic | Status: ACTIVE | Noted: 2022-11-28

## 2022-12-15 PROBLEM — I48.91 UNSPECIFIED ATRIAL FIBRILLATION: Chronic | Status: ACTIVE | Noted: 2022-11-28

## 2022-12-15 PROBLEM — M10.9 GOUT, UNSPECIFIED: Chronic | Status: ACTIVE | Noted: 2022-11-28

## 2022-12-15 LAB
ALBUMIN SERPL ELPH-MCNC: 2.5 G/DL — LOW (ref 3.3–5)
ALP SERPL-CCNC: 75 U/L — SIGNIFICANT CHANGE UP (ref 40–120)
ALT FLD-CCNC: 8 U/L — LOW (ref 10–45)
ANION GAP SERPL CALC-SCNC: 5 MMOL/L — SIGNIFICANT CHANGE UP (ref 5–17)
AST SERPL-CCNC: 21 U/L — SIGNIFICANT CHANGE UP (ref 10–40)
BASOPHILS # BLD AUTO: 0.02 K/UL — SIGNIFICANT CHANGE UP (ref 0–0.2)
BASOPHILS NFR BLD AUTO: 0.2 % — SIGNIFICANT CHANGE UP (ref 0–2)
BILIRUB SERPL-MCNC: 0.4 MG/DL — SIGNIFICANT CHANGE UP (ref 0.2–1.2)
BLD GP AB SCN SERPL QL: SIGNIFICANT CHANGE UP
BUN SERPL-MCNC: 35 MG/DL — HIGH (ref 7–23)
CALCIUM SERPL-MCNC: 9.3 MG/DL — SIGNIFICANT CHANGE UP (ref 8.4–10.5)
CHLORIDE SERPL-SCNC: 107 MMOL/L — SIGNIFICANT CHANGE UP (ref 96–108)
CO2 SERPL-SCNC: 30 MMOL/L — SIGNIFICANT CHANGE UP (ref 22–31)
CREAT SERPL-MCNC: 1.03 MG/DL — SIGNIFICANT CHANGE UP (ref 0.5–1.3)
EGFR: 53 ML/MIN/1.73M2 — LOW
EOSINOPHIL # BLD AUTO: 0.01 K/UL — SIGNIFICANT CHANGE UP (ref 0–0.5)
EOSINOPHIL NFR BLD AUTO: 0.1 % — SIGNIFICANT CHANGE UP (ref 0–6)
FLUAV AG NPH QL: SIGNIFICANT CHANGE UP
FLUBV AG NPH QL: SIGNIFICANT CHANGE UP
GLUCOSE SERPL-MCNC: 187 MG/DL — HIGH (ref 70–99)
HCT VFR BLD CALC: 24.2 % — LOW (ref 34.5–45)
HCT VFR BLD CALC: 27.8 % — LOW (ref 34.5–45)
HGB BLD-MCNC: 7 G/DL — CRITICAL LOW (ref 11.5–15.5)
HGB BLD-MCNC: 8 G/DL — LOW (ref 11.5–15.5)
IMM GRANULOCYTES NFR BLD AUTO: 0.5 % — SIGNIFICANT CHANGE UP (ref 0–0.9)
LYMPHOCYTES # BLD AUTO: 1.13 K/UL — SIGNIFICANT CHANGE UP (ref 1–3.3)
LYMPHOCYTES # BLD AUTO: 11.1 % — LOW (ref 13–44)
MCHC RBC-ENTMCNC: 27 PG — SIGNIFICANT CHANGE UP (ref 27–34)
MCHC RBC-ENTMCNC: 28.8 GM/DL — LOW (ref 32–36)
MCV RBC AUTO: 93.9 FL — SIGNIFICANT CHANGE UP (ref 80–100)
MONOCYTES # BLD AUTO: 0.63 K/UL — SIGNIFICANT CHANGE UP (ref 0–0.9)
MONOCYTES NFR BLD AUTO: 6.2 % — SIGNIFICANT CHANGE UP (ref 2–14)
NEUTROPHILS # BLD AUTO: 8.38 K/UL — HIGH (ref 1.8–7.4)
NEUTROPHILS NFR BLD AUTO: 81.9 % — HIGH (ref 43–77)
NRBC # BLD: 0 /100 WBCS — SIGNIFICANT CHANGE UP (ref 0–0)
PLATELET # BLD AUTO: 194 K/UL — SIGNIFICANT CHANGE UP (ref 150–400)
POTASSIUM SERPL-MCNC: 4.4 MMOL/L — SIGNIFICANT CHANGE UP (ref 3.5–5.3)
POTASSIUM SERPL-SCNC: 4.4 MMOL/L — SIGNIFICANT CHANGE UP (ref 3.5–5.3)
PROT SERPL-MCNC: 6.5 G/DL — SIGNIFICANT CHANGE UP (ref 6–8.3)
RBC # BLD: 2.96 M/UL — LOW (ref 3.8–5.2)
RBC # FLD: 16.1 % — HIGH (ref 10.3–14.5)
RSV RNA NPH QL NAA+NON-PROBE: DETECTED
SARS-COV-2 RNA SPEC QL NAA+PROBE: SIGNIFICANT CHANGE UP
SODIUM SERPL-SCNC: 142 MMOL/L — SIGNIFICANT CHANGE UP (ref 135–145)
WBC # BLD: 10.22 K/UL — SIGNIFICANT CHANGE UP (ref 3.8–10.5)
WBC # FLD AUTO: 10.22 K/UL — SIGNIFICANT CHANGE UP (ref 3.8–10.5)

## 2022-12-15 PROCEDURE — 93010 ELECTROCARDIOGRAM REPORT: CPT

## 2022-12-15 PROCEDURE — 99223 1ST HOSP IP/OBS HIGH 75: CPT

## 2022-12-15 PROCEDURE — 99283 EMERGENCY DEPT VISIT LOW MDM: CPT

## 2022-12-15 PROCEDURE — 71045 X-RAY EXAM CHEST 1 VIEW: CPT | Mod: 26

## 2022-12-15 PROCEDURE — 74176 CT ABD & PELVIS W/O CONTRAST: CPT | Mod: 26

## 2022-12-15 PROCEDURE — 93970 EXTREMITY STUDY: CPT | Mod: 26

## 2022-12-15 RX ORDER — PANTOPRAZOLE SODIUM 20 MG/1
40 TABLET, DELAYED RELEASE ORAL
Refills: 0 | Status: DISCONTINUED | OUTPATIENT
Start: 2022-12-15 | End: 2022-12-16

## 2022-12-15 RX ORDER — FLECAINIDE ACETATE 50 MG
50 TABLET ORAL EVERY 12 HOURS
Refills: 0 | Status: DISCONTINUED | OUTPATIENT
Start: 2022-12-15 | End: 2022-12-25

## 2022-12-15 RX ORDER — ACETAMINOPHEN 500 MG
650 TABLET ORAL EVERY 6 HOURS
Refills: 0 | Status: DISCONTINUED | OUTPATIENT
Start: 2022-12-15 | End: 2022-12-24

## 2022-12-15 RX ORDER — ONDANSETRON 8 MG/1
4 TABLET, FILM COATED ORAL EVERY 8 HOURS
Refills: 0 | Status: DISCONTINUED | OUTPATIENT
Start: 2022-12-15 | End: 2022-12-25

## 2022-12-15 RX ORDER — SODIUM CHLORIDE 9 MG/ML
500 INJECTION INTRAMUSCULAR; INTRAVENOUS; SUBCUTANEOUS ONCE
Refills: 0 | Status: COMPLETED | OUTPATIENT
Start: 2022-12-15 | End: 2022-12-15

## 2022-12-15 RX ORDER — ASCORBIC ACID 60 MG
500 TABLET,CHEWABLE ORAL DAILY
Refills: 0 | Status: DISCONTINUED | OUTPATIENT
Start: 2022-12-15 | End: 2022-12-24

## 2022-12-15 RX ORDER — ZINC SULFATE TAB 220 MG (50 MG ZINC EQUIVALENT) 220 (50 ZN) MG
220 TAB ORAL DAILY
Refills: 0 | Status: DISCONTINUED | OUTPATIENT
Start: 2022-12-15 | End: 2022-12-24

## 2022-12-15 RX ORDER — LANOLIN ALCOHOL/MO/W.PET/CERES
3 CREAM (GRAM) TOPICAL AT BEDTIME
Refills: 0 | Status: DISCONTINUED | OUTPATIENT
Start: 2022-12-15 | End: 2022-12-25

## 2022-12-15 RX ORDER — BUDESONIDE AND FORMOTEROL FUMARATE DIHYDRATE 160; 4.5 UG/1; UG/1
2 AEROSOL RESPIRATORY (INHALATION)
Refills: 0 | Status: DISCONTINUED | OUTPATIENT
Start: 2022-12-15 | End: 2022-12-25

## 2022-12-15 RX ORDER — PANTOPRAZOLE SODIUM 20 MG/1
80 TABLET, DELAYED RELEASE ORAL ONCE
Refills: 0 | Status: COMPLETED | OUTPATIENT
Start: 2022-12-15 | End: 2022-12-15

## 2022-12-15 RX ORDER — ATENOLOL 25 MG/1
1 TABLET ORAL
Qty: 0 | Refills: 0 | DISCHARGE

## 2022-12-15 RX ORDER — ATENOLOL 25 MG/1
50 TABLET ORAL DAILY
Refills: 0 | Status: DISCONTINUED | OUTPATIENT
Start: 2022-12-15 | End: 2022-12-25

## 2022-12-15 RX ORDER — POLYETHYLENE GLYCOL 3350 17 G/17G
0 POWDER, FOR SOLUTION ORAL
Qty: 0 | Refills: 0 | DISCHARGE

## 2022-12-15 RX ORDER — OXYCODONE AND ACETAMINOPHEN 5; 325 MG/1; MG/1
0.5 TABLET ORAL EVERY 8 HOURS
Refills: 0 | Status: DISCONTINUED | OUTPATIENT
Start: 2022-12-15 | End: 2022-12-19

## 2022-12-15 RX ORDER — DEXTROSE MONOHYDRATE, SODIUM CHLORIDE, AND POTASSIUM CHLORIDE 50; .745; 4.5 G/1000ML; G/1000ML; G/1000ML
1000 INJECTION, SOLUTION INTRAVENOUS
Refills: 0 | Status: DISCONTINUED | OUTPATIENT
Start: 2022-12-15 | End: 2022-12-16

## 2022-12-15 RX ORDER — FLUTICASONE FUROATE AND VILANTEROL TRIFENATATE 100; 25 UG/1; UG/1
1 POWDER RESPIRATORY (INHALATION)
Qty: 0 | Refills: 0 | DISCHARGE

## 2022-12-15 RX ORDER — ALLOPURINOL 300 MG
1 TABLET ORAL
Qty: 0 | Refills: 0 | DISCHARGE

## 2022-12-15 RX ORDER — FLECAINIDE ACETATE 50 MG
1 TABLET ORAL
Qty: 0 | Refills: 0 | DISCHARGE

## 2022-12-15 RX ORDER — APIXABAN 2.5 MG/1
1 TABLET, FILM COATED ORAL
Qty: 0 | Refills: 0 | DISCHARGE

## 2022-12-15 RX ORDER — DOCUSATE SODIUM 100 MG
1 CAPSULE ORAL
Qty: 0 | Refills: 0 | DISCHARGE

## 2022-12-15 RX ORDER — ALLOPURINOL 300 MG
100 TABLET ORAL DAILY
Refills: 0 | Status: DISCONTINUED | OUTPATIENT
Start: 2022-12-15 | End: 2022-12-25

## 2022-12-15 RX ADMIN — SODIUM CHLORIDE 1000 MILLILITER(S): 9 INJECTION INTRAMUSCULAR; INTRAVENOUS; SUBCUTANEOUS at 14:50

## 2022-12-15 RX ADMIN — BUDESONIDE AND FORMOTEROL FUMARATE DIHYDRATE 2 PUFF(S): 160; 4.5 AEROSOL RESPIRATORY (INHALATION) at 21:38

## 2022-12-15 RX ADMIN — PANTOPRAZOLE SODIUM 80 MILLIGRAM(S): 20 TABLET, DELAYED RELEASE ORAL at 14:50

## 2022-12-15 NOTE — ED PROVIDER NOTE - NS ED ATTENDING STATEMENT MOD
This was a shared visit with the SANDI. I reviewed and verified the documentation and independently performed the documented:

## 2022-12-15 NOTE — H&P ADULT - NSHPPHYSICALEXAM_GEN_ALL_CORE
T(C): 37.5 (12-15-22 @ 14:16), Max: 37.5 (12-15-22 @ 14:16)  HR: 79 (12-15-22 @ 14:16) (79 - 79)  BP: 159/68 (12-15-22 @ 14:16) (159/68 - 159/68)  RR: 18 (12-15-22 @ 14:16) (18 - 18)  SpO2: 93% (12-15-22 @ 14:16) (93% - 93%)  Wt(kg): --Vital Signs Last 24 Hrs  T(C): 37.5 (15 Dec 2022 14:16), Max: 37.5 (15 Dec 2022 14:16)  T(F): 99.5 (15 Dec 2022 14:16), Max: 99.5 (15 Dec 2022 14:16)  HR: 79 (15 Dec 2022 14:16) (79 - 79)  BP: 159/68 (15 Dec 2022 14:16) (159/68 - 159/68)  BP(mean): --  RR: 18 (15 Dec 2022 14:16) (18 - 18)  SpO2: 93% (15 Dec 2022 14:16) (93% - 93%)    Parameters below as of 15 Dec 2022 14:16  Patient On (Oxygen Delivery Method): room air        PHYSICAL EXAM:  GENERAL: NAD, well-groomed, well-developed  HEAD:  Atraumatic, Normocephalic  EYES: EOMI, PERRLA, conjunctiva and sclera clear  ENMT: No tonsillar erythema, exudates, or enlargement; Moist mucous membranes, Good dentition, No lesions  NECK: Supple, No JVD, Normal thyroid  NERVOUS SYSTEM:  Alert & Oriented X3, Good concentration; Motor Strength 5/5 B/L upper and lower extremities  CHEST/LUNG: Clear to auscultation bilaterally; No rales, rhonchi, wheezing, or rubs  HEART: Regular rate and rhythm; No murmurs, rubs, or gallops  ABDOMEN: Soft, Nontender, Nondistended; Bowel sounds present  EXTREMITIES:  intact Peripheral Pulses, No clubbing, cyanosis, or edema  LYMPH: No lymphadenopathy noted  SKIN: No rashes or lesions T(C): 37.5 (12-15-22 @ 14:16), Max: 37.5 (12-15-22 @ 14:16)  HR: 79 (12-15-22 @ 14:16) (79 - 79)  BP: 159/68 (12-15-22 @ 14:16) (159/68 - 159/68)  RR: 18 (12-15-22 @ 14:16) (18 - 18)  SpO2: 93% (12-15-22 @ 14:16) (93% - 93%)  Wt(kg): --Vital Signs Last 24 Hrs  T(C): 37.5 (15 Dec 2022 14:16), Max: 37.5 (15 Dec 2022 14:16)  T(F): 99.5 (15 Dec 2022 14:16), Max: 99.5 (15 Dec 2022 14:16)  HR: 79 (15 Dec 2022 14:16) (79 - 79)  BP: 159/68 (15 Dec 2022 14:16) (159/68 - 159/68)  BP(mean): --  RR: 18 (15 Dec 2022 14:16) (18 - 18)  SpO2: 93% (15 Dec 2022 14:16) (93% - 93%)    Parameters below as of 15 Dec 2022 14:16  Patient On (Oxygen Delivery Method): room air        PHYSICAL EXAM:  GENERAL: anxious appearing, restless, well-groomed, well-developed  HEAD:  Atraumatic, Normocephalic  EYES: EOMI, PERRLA, conjunctiva and sclera clear  ENMT: No tonsillar erythema, exudates, or enlargement; Moist mucous membranes, Good dentition, No lesions  NECK: Supple, No JVD, Normal thyroid  NERVOUS SYSTEM:  Alert & Oriented X3, Good concentration; Motor Strength 5/5 B/L upper and lower extremities  CHEST/LUNG: Clear to auscultation bilaterally; No rales, rhonchi, wheezing, or rubs. +cough  HEART: Regular rate and rhythm; No murmurs, rubs, or gallops  ABDOMEN: Soft, Nontender, Nondistended; Bowel sounds present  EXTREMITIES:  intact Peripheral Pulses, No clubbing, cyanosis, or edema  LYMPH: No lymphadenopathy noted  SKIN: No rashes or lesions T(C): 37.5 (12-15-22 @ 14:16), Max: 37.5 (12-15-22 @ 14:16)  HR: 79 (12-15-22 @ 14:16) (79 - 79)  BP: 159/68 (12-15-22 @ 14:16) (159/68 - 159/68)  RR: 18 (12-15-22 @ 14:16) (18 - 18)  SpO2: 93% (12-15-22 @ 14:16) (93% - 93%)  Wt(kg): --Vital Signs Last 24 Hrs  T(C): 37.5 (15 Dec 2022 14:16), Max: 37.5 (15 Dec 2022 14:16)  T(F): 99.5 (15 Dec 2022 14:16), Max: 99.5 (15 Dec 2022 14:16)  HR: 79 (15 Dec 2022 14:16) (79 - 79)  BP: 159/68 (15 Dec 2022 14:16) (159/68 - 159/68)  BP(mean): --  RR: 18 (15 Dec 2022 14:16) (18 - 18)  SpO2: 93% (15 Dec 2022 14:16) (93% - 93%)    Parameters below as of 15 Dec 2022 14:16  Patient On (Oxygen Delivery Method): room air        PHYSICAL EXAM:  GENERAL: anxious appearing, restless, well-groomed, thin, malnourished appearing  HEAD:  Atraumatic, Normocephalic  EYES: EOMI, PERRLA, conjunctiva and sclera clear  ENMT: No tonsillar erythema, exudates, or enlargement; Moist mucous membranes, fair dentition, No lesions  NECK: Supple, No JVD, Normal thyroid  NERVOUS SYSTEM:  alert, but not oriented. Good concentration; Moves all extremities.  CHEST/LUNG: Clear to auscultation bilaterally; No rales, rhonchi, wheezing, or rubs. +cough  HEART: Regular rate and rhythm; No murmurs, rubs, or gallops  ABDOMEN: Soft, Nontender, Nondistended; Bowel sounds present  EXTREMITIES:  intact Peripheral Pulses, No clubbing, cyanosis, or edema  LYMPH: No lymphadenopathy noted  SKIN:skin tear right forearm, right heel unstageable pressure wound, unstageable buttock wound and on right great toe. pale appearing. T(C): 37.5 (12-15-22 @ 14:16), Max: 37.5 (12-15-22 @ 14:16)  HR: 79 (12-15-22 @ 14:16) (79 - 79)  BP: 159/68 (12-15-22 @ 14:16) (159/68 - 159/68)  RR: 18 (12-15-22 @ 14:16) (18 - 18)  SpO2: 93% (12-15-22 @ 14:16) (93% - 93%)  Wt(kg): --Vital Signs Last 24 Hrs  T(C): 37.5 (15 Dec 2022 14:16), Max: 37.5 (15 Dec 2022 14:16)  T(F): 99.5 (15 Dec 2022 14:16), Max: 99.5 (15 Dec 2022 14:16)  HR: 79 (15 Dec 2022 14:16) (79 - 79)  BP: 159/68 (15 Dec 2022 14:16) (159/68 - 159/68)  BP(mean): --  RR: 18 (15 Dec 2022 14:16) (18 - 18)  SpO2: 93% (15 Dec 2022 14:16) (93% - 93%)    Parameters below as of 15 Dec 2022 14:16  Patient On (Oxygen Delivery Method): room air        PHYSICAL EXAM:  GENERAL: anxious appearing, restless, well-groomed, thin, malnourished appearing  HEAD:  Atraumatic, Normocephalic  EYES: EOMI, PERRLA, conjunctiva and sclera clear  ENMT: No tonsillar erythema, exudates, or enlargement; Moist mucous membranes, fair dentition, No lesions  NECK: Supple, No JVD, Normal thyroid  NERVOUS SYSTEM:  alert, but not oriented. Good concentration; Moves all extremities.  CHEST/LUNG: Clear to auscultation bilaterally; No rales, rhonchi, wheezing, or rubs. +cough  HEART: Regular rate and rhythm; No murmurs, rubs, or gallops  ABDOMEN: Soft, Nontender, Nondistended; Bowel sounds present  EXTREMITIES:  intact Peripheral Pulses, No clubbing, cyanosis; RLE 2+ edema  LYMPH: No lymphadenopathy noted  SKIN:skin tear right forearm, right heel unstageable pressure wound, unstageable buttock wound and on right great toe. pale appearing.

## 2022-12-15 NOTE — ED PROVIDER NOTE - OBJECTIVE STATEMENT
87F with AF on Eliquis, HTN, COPD, CHFpEF, pulmonary HTN, dementia 87F with AF on Eliquis, HTN, COPD, CHFpEF, pulmonary HTN, dementia from [facility]. Patient sent in by Dr. Campos for evaluation of her decreasing hemoglobin.  Per chart review patient's hemoglobin was 9.5 in July and is now 8.0.  Patient was admitted at [facility] 1 month ago for wound care and at her that time hemoglobin was 8.3.  Negative occult last visit but Dr. Campos states positive occult in his office.

## 2022-12-15 NOTE — H&P ADULT - ASSESSMENT
88 yo female with PMH A Fib on eliquis, HTN, COPD, CHF, pulmonary HTN, dementia, recently admitted Nov 11/28-11/30/2022 for non healing right ankle wound w/ concern for sepsis, presents at the request of PCP due to +fecal occult and H/H of  at [facility]    # Normocytic anemia  fecal occult was negative on 11/28  - trend h/h  - active type and screen  - GI consult  - CLD, NPO after MN  - hemodynamically stable    #Nonhealing right medial malleolar wound, unstageable   - saw wound care at last hospitalization    #JEREMY on CKD  - Improved s/p IVF  - Holding diuretics for now   - Repeat BMP in AM  - Avoid nephrotoxins     #Chronic A-Fib on Eliquis  #Chronic diastolic heart failure (E 50-55% 11/29/22)  # HTN  #Bioprosthetic AV  - Continue home meds - flecainide and atenolol  - Hold lasix and metolazone for now  - Continue eliquis for AC  - Check TTE, routine, to assess EF    #COPD without exacerbation   - On Breo-Ellipta, will give symbicort while admitted     #Gout  - Allopurinol    #Dementia  - Supportive care  - Frequent reorientation    #DVT ppx  - On eliquis    GOC: DNR/DNI         86 yo female with PMH A Fib on eliquis, HTN, COPD, CHF, pulmonary HTN, dementia, recently admitted Nov 11/28-11/30/2022 for non healing right ankle wound w/ concern for sepsis, presents at the request of PCP due to +fecal occult and H/H of  at [facility]    # Normocytic anemia  fecal occult was negative on 11/28, though positive as an outpatient on ***. She has been on eliquis, which will be held.   - trend h/h  - active type and screen  - GI consulted, sent a teams message to ***  - CLD, NPO after MN  - hemodynamically stable  - pt will not be able to consult to colonoscopy.    #Multiple skin wounds - present on admission  #Nonhealing right medial malleolar wound, unstageable   - skin tear right forearm, right heel unstageable pressure wound, unstageable buttock wound and on right great toe.  - skin wounds do not appear infected.  - saw wound care at last hospitalization, please consult in AM.    #JEREMY on CKD  - Improved s/p IVF  - Holding diuretics for now   - Repeat BMP in AM  - Avoid nephrotoxins     #Chronic A-Fib on Eliquis  #Chronic diastolic heart failure (E 50-55% 11/29/22)  # HTN  #Bioprosthetic AV  - Continue home meds - flecainide and atenolol  - Hold lasix and metolazone for now  - hold eliquis in setting of ?GIB  - Check TTE, routine, to assess EF    #COPD without exacerbation   - On Breo-Ellipta, will give symbicort while admitted     #Gout  - Allopurinol    #Dementia  - Supportive care  - Frequent reorientation    #DVT ppx  - On eliquis    GOC: DNR/DNI         88 yo female with PMH A Fib on eliquis, AV stenosis s/p TAVR, HTN, asthma, HFpEF, pulmonary HTN, dementia, recently admitted Nov 11/28-11/30/2022 for non healing right ankle wound w/ concern for sepsis, presents at the request of PCP due to +fecal occult and hemoglobin of 7.4 at [facility].    # Normocytic anemia  fecal occult was negative on 11/28, though pt reported to be guaiac positive prior to presentation. hemoglobin was 8.3 (11/29) --> 8.0 today. She has been on eliquis, which will be held. Do not suspect acute bleeding, likely chronic, hemodynamically stable (no hypotension or tachycardia)  - trend h/h, 21:00  - will order CT abdomen and pelvis no contrast  - active type and screen  - fecal occult positive as OP. will repeat here.  - GI consulted, sent a teams message to Dr. Fletcher. GI to see in AM.  - CLD, NPO after MN  - hemodynamically stable  - telemetry monitoring.  - pt will not be able to consent to colonoscopy - discussed pros/cons with daughter of having procedures, what to do w/ the results, risk of having a procedure with underlying co-morbidities. She will think about how aggressive she would want to be.   - blood consent in chart.    # Cough secondary to RSV  - cough medicine   - albuterol  - continuous pulse ox    #Multiple skin wounds - present on admission  #Nonhealing right medial malleolar wound, unstageable   - skin tear right forearm, right heel unstageable pressure wound, unstageable buttock wound and on right great toe.  - skin wounds do not appear infected.  - saw wound care at last hospitalization, please consult in AM.    #JEREMY on CKD  - Improved s/p IVF  - Holding diuretics for now   - Repeat BMP in AM  - Avoid nephrotoxins     #Chronic A-Fib on Eliquis  #Chronic diastolic heart failure (E 50-55% 11/29/22)  # HTN  #Bioprosthetic AV  - Continue home meds - flecainide and atenolol  - Hold lasix and metolazone for now  - hold eliquis in setting of ?GIB  - Check TTE, routine, to assess EF    #COPD without exacerbation   - On Breo-Ellipta, symbicort while admitted.    #Gout  - Allopurinol    #Advanced Dementia  - Supportive care  - Frequent reorientation  - turn patient    DNR/DNI - confirmed with daughter  Updated HCP/daughter Sandi Metzger on admission - home phone preferred [phone]. Alternate cell # is [phone]  dvt ppx: hold eliquis, SCDs.  med rec: performed w/ facility ppwk     86 yo female with PMH A Fib on eliquis, AV stenosis s/p TAVR, HTN, asthma, HFpEF, pulmonary HTN, dementia, recently admitted Nov 11/28-11/30/2022 for non healing right ankle wound w/ concern for sepsis, presents at the request of PCP due to +fecal occult and hemoglobin of 7.4 at care home.    # Normocytic anemia  fecal occult was negative on 11/28, though pt reported to be guaiac positive prior to presentation. hemoglobin was 8.3 (11/29) --> 8.0 today. She has been on eliquis, which will be held. Do not suspect acute bleeding, likely chronic, hemodynamically stable (no hypotension or tachycardia)  - trend h/h, 21:00  - will order CT abdomen and pelvis no contrast  - active type and screen  - fecal occult positive as OP. will repeat here.  - GI consulted, sent a teams message to Dr. Fletcher. GI to see in AM.  - CLD, NPO after MN  - hemodynamically stable  - telemetry monitoring.  - pt will not be able to consent to colonoscopy - discussed pros/cons with daughter of having procedures, what to do w/ the results, risk of having a procedure with underlying co-morbidities. She will think about how aggressive she would want to be.   - blood consent in chart.    # Cough secondary to RSV  - cough medicine   - albuterol  - continuous pulse ox    #Multiple skin wounds - present on admission  #Nonhealing right medial malleolar wound, unstageable   - skin tear right forearm, right heel unstageable pressure wound, unstageable buttock wound and on right great toe.  - skin wounds do not appear infected.  - saw wound care at last hospitalization, please consult in AM.    #JEREMY on CKD  - Improved s/p IVF  - Holding diuretics for now   - Repeat BMP in AM  - Avoid nephrotoxins     #Chronic A-Fib on Eliquis  #Chronic diastolic heart failure (E 50-55% 11/29/22)  # HTN  #Bioprosthetic AV  - Continue home meds - flecainide and atenolol  - Hold lasix and metolazone for now  - hold eliquis in setting of ?GIB  - Check TTE, routine, to assess EF    # Abnormal EKG   - possible limb lead reversal.   - repeat, f/u    #COPD without exacerbation   - On Breo-Ellipta, symbicort while admitted.    #Gout  - Allopurinol    #Advanced Dementia  - Supportive care  - Frequent reorientation  - turn patient    DNR/DNI - confirmed with daughter  Updated HCP/daughter Sandi Metzger on admission - home phone preferred [phone]. Alternate cell # is [phone]  dvt ppx: hold eliquis, SCDs.  med rec: performed w/ facility ppwk     88 yo female with PMH A Fib on eliquis, AV stenosis s/p TAVR, HTN, asthma, HFpEF, pulmonary HTN, dementia, recently admitted Nov 11/28-11/30/2022 for non healing right ankle wound w/ concern for sepsis, presents at the request of PCP due to +fecal occult and hemoglobin of 7.4 at [facility]. CT abd/pelvis w/ non contrast pending. GI consulted.    # Progressive Normocytic anemia, possible due to GI bleed  Fecal occult was negative on 11/28, though pt reported to be guaiac positive prior to presentation. Hemoglobin 8.6 (July 2022) --> 8.3 (11/29/22) --> 8.0 today. She has been on eliquis, which will be held. I saw pt's stool in the room, and it was brown in color, no hematochezia. Likely a slow bleed, slow downtrending h/h, pt is hemodynamically stable (no hypotension or tachycardia).  - trend h/h, 21:00  - will order CT abdomen and pelvis no contrast  - active type and screen  - fecal occult positive as OP. Will repeat here.  - PPI IV BID  - GI consulted, sent a teams message to Dr. Fletcher. GI to see in AM.  - CLD, NPO after MN  - telemetry monitoring.  - pt will not be able to consent to colonoscopy - discussed pros/cons with daughter of having procedures, what to do w/ the results, risk of having a procedure with underlying co-morbidities. She will think about how aggressive she would want to be.   - blood consent in chart.    # Cough secondary to RSV  # COPD without exacerbation   - pt is currently 95% on room air, CXR without obvious infiltrate (personal read), f/u final read  - cough medicine   - albuterol  - On Breo-Ellipta, symbicort while admitted.  - continuous pulse ox    #Pre-renal azotemia  #CKD st. III  - s/p 500 cc IVF in ED  - will start gentle IVF overnight in setting of NPO, reassess in AM.  - Holding diuretics for now   - Repeat BMP in AM    #Multiple skin wounds - present on admission  #Nonhealing right medial malleolar wound, unstageable   - skin tear right forearm, right heel unstageable pressure wound, unstageable buttock wound and on right great toe.  - skin wounds do not appear infected.  - i have wrote the wound care orders from facility.  - saw wound care at last hospitalization, please consult in AM.  - nutritional support w/ vitamins     #Chronic A-Fib on Eliquis  #Chronic diastolic heart failure (E 50-55% 11/29/22)  # HTN  #Bioprosthetic AV  - Continue home meds - flecainide and atenolol  - hold eliquis in setting of anemia    # Abnormal EKG   - possible limb lead reversal.   - repeat, f/u    #Gout  - Allopurinol    #Advanced Dementia  - Supportive care  - Frequent reorientation  - turn patient    DNR/DNI - confirmed with daughter  Updated HCP/daughter Sandi Metzger on admission - home phone preferred [phone]. Alternate cell # is [phone]  dvt ppx: hold eliquis, SCDs.  med rec: performed w/ facility ppwk     88 yo female with PMH A Fib on eliquis, AV stenosis s/p TAVR, HTN, asthma, HFpEF, pulmonary HTN, dementia, recently admitted Nov 11/28-11/30/2022 for non healing right ankle wound w/ concern for sepsis, presents at the request of PCP due to [facility]cal occult [facility] abd/pelvis w/ non contrast pending. GI consulted.    # Progressive Normocytic anemia, possible due to GI bleed  Fecal occult was negative on 11/28, though pt reported to be guaiac positive prior to presentation. Hemoglobin 8.6 (July 2022) --> 8.3 (11/29/22) --> 8.0 today. She has been on eliquis, which will be held. I saw pt's stool in the room, and it was brown in color, no hematochezia. Likely a slow bleed, slow downtrending h/h, pt is hemodynamically stable (no hypotension or tachycardia).  - trend h/h, 21:00  - will order CT abdomen and pelvis no contrast  - active type and screen  - fecal occult positive as OP. Will repeat here.  - PPI IV BID  - GI consulted, sent a teams message to Dr. Fletcher. GI to see in AM.  - CLD, NPO after MN  - telemetry monitoring.  - pt will not be able to consent to colonoscopy - discussed pros/cons with daughter of having procedures, what to do w/ the results, risk of having a procedure with underlying co-morbidities. She will think about how aggressive she would want to be.   - blood consent in chart.    # Cough secondary to RSV  # COPD without exacerbation   - pt is currently 95% on room air, CXR without obvious infiltrate (personal read), f/u final read  - cough medicine   - albuterol  - On Breo-Ellipta, symbicort while admitted.  - continuous pulse ox    #Pre-renal azotemia  #CKD st. III  - s/p 500 cc IVF in ED  - will start gentle IVF overnight in setting of NPO, reassess in AM.  - Holding diuretics for now   - Repeat BMP in AM    # RLE edema  - f/u duplex.   - was on AC as an OP.    #Multiple skin wounds - present on admission  #Nonhealing right medial malleolar wound, unstageable   - skin tear right forearm, right heel unstageable pressure wound, unstageable buttock wound and on right great toe.  - skin wounds do not appear infected.  - i have wrote the wound care orders from facility.  - saw wound care at last hospitalization, please consult in AM.  - nutritional support w/ vitamins     #Chronic A-Fib on Eliquis  #Chronic diastolic heart failure (E 50-55% 11/29/22)  # HTN  #Bioprosthetic AV  - Continue home meds - flecainide and atenolol  - hold eliquis in setting of anemia    # Abnormal EKG   - possible limb lead reversal.   - repeat, f/u    #Gout  - Allopurinol    #Advanced Dementia  - Supportive care  - Frequent reorientation  - turn patient    DNR/DNI - confirmed with daughter  Updated HCP/daughter Sandi Metzger on admission - home phone preferred [phone]. Alternate cell # is [phone]  dvt ppx: hold eliquis, SCDs.  med rec: performed w/ facility ppwk     86 yo female with PMH A Fib on eliquis, AV stenosis s/p TAVR, HTN, asthma, HFpEF, pulmonary HTN, dementia, recently admitted Nov 11/28-11/30/2022 for non healing right ankle wound w/ concern for sepsis, presents at the request of PCP due to +fecal occult and hemoglobin of 7.4 at [facility] CT abd/pelvis w/ non contrast pending. GI consulted.    # Progressive Normocytic anemia, possible due to GI bleed  Fecal occult was negative on 11/28, though pt reported to be guaiac positive prior to presentation. Hemoglobin 8.6 (July 2022) --> 8.3 (11/29/22) --> 8.0 today. She has been on eliquis, which will be held. I saw pt's stool in the room, and it was brown in color, no hematochezia. Likely a slow bleed, slow downtrending h/h, pt is hemodynamically stable (no hypotension or tachycardia).  - trend h/h, 21:00  - will order CT abdomen and pelvis no contrast  - active type and screen  - fecal occult positive as OP. Will repeat here.  - PPI IV BID  - f/u H. Pylori stool antigen  - GI consulted, sent a teams message to Dr. Fletcher. GI to see in AM.  - CLD, NPO after MN  - telemetry monitoring.  - pt will not be able to consent to colonoscopy - discussed pros/cons with daughter of having procedures, what to do w/ the results, risk of having a procedure with underlying co-morbidities. She will think about how aggressive she would want to be.   - blood consent in chart.    # Cough secondary to RSV  # COPD without exacerbation   - pt is currently 95% on room air, CXR without obvious infiltrate (personal read), f/u final read  - cough medicine   - albuterol  - On Breo-Ellipta, symbicort while admitted.  - continuous pulse ox    #Pre-renal azotemia  #CKD st. III  - s/p 500 cc IVF in ED  - will start gentle IVF overnight in setting of NPO, reassess in AM.  - Holding diuretics for now   - Repeat BMP in AM    # RLE edema  - f/u duplex.   - was on AC as an OP.    #Multiple skin wounds - present on admission  #Nonhealing right medial malleolar wound, unstageable   - skin tear right forearm, right heel unstageable pressure wound, unstageable buttock wound and on right great toe.  - skin wounds do not appear infected.  - i have wrote the wound care orders from facility.  - saw wound care at last hospitalization, please consult in AM.  - nutritional support w/ vitamins     #Chronic A-Fib on Eliquis  #Chronic diastolic heart failure (E 50-55% 11/29/22)  # HTN  #Bioprosthetic AV  - Continue home meds - flecainide and atenolol  - hold eliquis in setting of anemia    # Abnormal EKG   - possible limb lead reversal.   - repeat, f/u    #Gout  - Allopurinol    #Advanced Dementia  - Supportive care  - Frequent reorientation  - turn patient    DNR/DNI - confirmed with daughter  Updated HCP/daughter Sandi Metzger on admission - home phone preferred [phone]. Alternate cell # is [phone]  dvt ppx: hold eliquis, SCDs.  med rec: performed w/ facility ppwk     88 yo female with PMH A Fib on eliquis, AV stenosis s/p TAVR, HTN, asthma, HFpEF, pulmonary HTN, dementia, recently admitted Nov 11/28-11/30/2022 for non healing right ankle wound w/ concern for sepsis, presents at the request of [facility]CP [facility]pelvis w/ non contrast pending. GI consulted.    # Progressive Normocytic anemia, possible due to GI bleed  Fecal occult was negative on 11/28, though pt reported to be guaiac positive prior to presentation. Hemoglobin 8.6 (July 2022) --> 8.3 (11/29/22) --> 8.0 today. She has been on eliquis, which will be held. I saw pt's stool in the room, and it was brown in color, no hematochezia. Likely a slow bleed, slow downtrending h/h, pt is hemodynamically stable (no hypotension or tachycardia).  - trend h/h, 21:00  - will order CT abdomen and pelvis no contrast  - active type and screen  - fecal occult positive as OP. Will repeat here.  - PPI IV BID  - f/u H. Pylori stool antigen  - GI consulted, sent a teams message to Dr. Fletcher. GI to see in AM.  - CLD, NPO after MN  - telemetry monitoring.  - pt will not be able to consent to colonoscopy - discussed pros/cons with daughter of having procedures, what to do w/ the results, risk of having a procedure with underlying co-morbidities, lack of patient cooperation. She will think about how aggressive she would want to be.   - blood consent in chart.    # Cough secondary to RSV  # COPD without exacerbation   - pt is currently 95% on room air, CXR without obvious infiltrate (personal read), f/u final read  - cough medicine   - albuterol  - On Breo-Ellipta, symbicort while admitted.  - continuous pulse ox    #Pre-renal azotemia  #CKD st. III  - s/p 500 cc IVF in ED  - will start gentle IVF overnight in setting of NPO, reassess in AM.  - Holding diuretics for now   - Repeat BMP in AM    # RLE edema  - f/u duplex.   - was on AC as an OP.    #Multiple skin wounds - present on admission  #Nonhealing right medial malleolar wound, unstageable   - skin tear right forearm, right heel unstageable pressure wound, unstageable buttock wound and on right great toe.  - skin wounds do not appear infected.  - i have wrote the wound care orders from facility.  - saw wound care at last hospitalization, please consult in AM.  - nutritional support w/ vitamins     #Chronic A-Fib on Eliquis  #Chronic diastolic heart failure (E 50-55% 11/29/22)  # HTN  #Bioprosthetic AV  - Continue home meds - flecainide and atenolol  - hold eliquis in setting of anemia    # Abnormal EKG   - possible limb lead reversal.   - repeat, f/u    #Gout  - Allopurinol    #Advanced Dementia  - Supportive care  - Frequent reorientation  - turn patient    DNR/DNI - confirmed with daughter  Updated HCP/daughter Sandi Metzger on admission - home phone preferred [phone]. Alternate cell # is [phone]  dvt ppx: hold eliquis, SCDs.  med rec: performed w/ facility ppwk

## 2022-12-15 NOTE — PROVIDER CONTACT NOTE (CRITICAL VALUE NOTIFICATION) - SITUATION
pt is a 87yrs old female admitted to unit from ED due to Anemia. Hb level was 7.4  Lab reported to floor Hb drop to 7.0

## 2022-12-15 NOTE — ED ADULT NURSE NOTE - OBJECTIVE STATEMENT
Pt BIB EMS from [facility] for abnormal lab result. Pt BIB EMS from [facility] for abnormal lab result. Pt sent in for hgb= 7.4 due to acute GI bleed. Pt with hx dementia, CHF, afib and COPD. Pt a & o x 1 and unable to provide any information. Pt BIB EMS from [facility] for abnormal lab result. Pt sent in for admission by MD Campos for hgb= 7.4 due to acute GI bleed. Pt with hx dementia, CHF, afib (on eliquis) and COPD. Pt a & o x 1 and unable to provide any information. Pt denies chest pain, SOB, dizziness, or any other symptoms. Pt BIB EMS from [facility] for abnormal lab result. Pt sent in for admission by MD Campos for hgb= 7.4 due to acute GI bleed. Pt with hx dementia, CHF, afib (on eliquis) and COPD. Pt a & o x 1 and unable to provide any information. Pt denies chest pain, SOB, dizziness, or any other symptoms. Pt noted to have generalized bruising, +3 RLE edema, a left FA skin tear, a right medial ankle unstageable, right great toe unstageable, right heel unstafeable and a sacral unstageable. Pt BIB EMS from [facility] for abnormal lab result. Pt sent in for admission by MD Campos for hgb= 7.4 due to acute GI bleed. Pt with hx dementia, CHF, afib (on eliquis) and COPD. Pt a & o x 1 and unable to provide any information. Pt denies chest pain, SOB, dizziness, or any other symptoms. Pt noted to have generalized bruising, +3 RLE edema, a left FA skin tear, a right medial ankle unstageable, right great toe unstageable, right heel unstageable and a sacral unstageable.

## 2022-12-15 NOTE — ED PROVIDER NOTE - CLINICAL SUMMARY MEDICAL DECISION MAKING FREE TEXT BOX
87F with AF on Eliquis, HTN, COPD, CHFpEF, pulmonary HTN, dementia from [facility] assisted living. Patient sent in by Dr. Santos for evaluation of her decreasing hemoglobin.  Per chart review patient's hemoglobin was 9.5 in July and is now 8.0.  Patient was admitted at [facility] 1 month ago for wound care and at her that time hemoglobin was 8.3.  Negative occult last visit but Dr. Santos states positive occult in his office.  dr. santos would like pt admitted for gi evaluation. of note pt found to be RSV positive and sating 93% on RA

## 2022-12-15 NOTE — H&P ADULT - HISTORY OF PRESENT ILLNESS
Pt unable to provide a history due to dementia.  88 yo female with PMH A Fib on eliquis, HTN, COPD, CHF, pulmonary HTN, dementia, recently admitted Nov 11/28-11/30/2022 for non healing right ankle wound w/ concern for sepsis, presents at the request of PCP due to +fecal occult and H/H of  at [facility].  Pt unable to provide a history due to dementia. Patient continues to call out "help."  86 yo female with PMH A Fib on eliquis, HTN, COPD, CHF, pulmonary HTN, dementia, recently admitted Nov 11/28-11/30/2022 for non healing right ankle wound w/ concern for sepsis, presents at the request of PCP due to +fecal occult and Hemoglobin of 7.4 at [facility].  Pt unable to provide a history due to dementia. Patient continues to call out "help." She does not indicate any specific complaints. She has a cough.     ED course: 80 mg IV PPI.  500 cc IV fluid bolus.  Found to be RSV + 88 yo female with PMH A Fib on eliquis, HTN, COPD, HFpEF, advanced dementia, recently admitted Nov 11/28-11/30/2022 for non healing right ankle wound w/ concern for sepsis, presents at the request of PCP due to +fecal occult and Hemoglobin of 7.4 at [facility].  Pt unable to provide a history due to dementia. Patient continues to call out "help." She does not indicate any specific complaints. She has a cough.     ED course:   80 mg IV PPI.  500 cc IV fluid bolus.  Found to be RSV +  hemoglobin 8.0

## 2022-12-15 NOTE — ED PROVIDER NOTE - PHYSICAL EXAMINATION
GENERAL: NAD, elderly, pleasantly confused, thin   HEAD:  Atraumatic, Normocephalic  EYES: EOMI, conjunctiva and sclera clear  ENMT: Moist mucous membranes   NECK: Supple, No JVD  CHEST/LUNG: poor inspiratory effort   HEART: RRR  ABDOMEN: Soft, Nontender, Nondistended; Bowel sounds present  VASCULAR: Normal pulses, Normal capillary refill  EXTREMITIES: No calf tenderness, No cyanosis, No edema  SKIN: Warm, healing ulceration to right medial malleolus, no erythema, no streaking up leg   NERVOUS SYSTEM:  A/O x1, No focal deficits

## 2022-12-15 NOTE — H&P ADULT - NSHPLABSRESULTS_GEN_ALL_CORE
LABS:                        8.0    10.22 )-----------( 194      ( 15 Dec 2022 14:50 )             27.8     12-15    142  |  107  |  35<H>  ----------------------------<  187<H>  4.4   |  30  |  1.03    Ca    9.3      15 Dec 2022 14:50    TPro  6.5  /  Alb  2.5<L>  /  TBili  0.4  /  DBili  x   /  AST  21  /  ALT  8<L>  /  AlkPhos  75  12-15         CAPILLARY BLOOD GLUCOSE    RADIOLOGY & ADDITIONAL TESTS:  < from: TTE Echo Complete w/o Contrast w/ Doppler (11.29.22 @ 08:20) >    Summary:   1. Technically difficult study with poor endocardial visualization.   2. Normal global left ventricular systolic function.   3. Left ventricular ejection fraction, by visual estimation, is 50 to   55%.   4. Normal left ventricular internal cavity size.   5. Moderate upper septal hypertrophy (1.3 cm). Abnormal septal motion   which may be due to conduction delay. Mild inferior wall hypokinesis.   6. Normal right ventricular size and function.   7. Possible moderator band (fibrinous band) visualized along the right   ventricle apex.   8. The left atrium is not well visualized, appears dilated.   9. The right atrium is not well visualized, appears generally normal in   size.  10. Mitral annular calcification present. There appears to be possible   mitral valve repair/prosthesis (images not well visualized).  11. Mild to moderate mitral valve regurgitation.  12. Mild tricuspid regurgitation.  13. There is a bioprosthetic valve in the aortic position with peak   velocity within normal limits. Mild paravalvular regurgitation.  14. Top normal sized proximal ascending aorta (3.6 cm).  15. Thereis no evidence of pericardial effusion.    < end of copied text >        Consultant(s) Notes Reviewed:  [x ] YES  [ ] NO  Care Discussed with Consultants/Other Providers [ x] YES  [ ] NO  Imaging Personally Reviewed:  [x ] YES  [ ] NO  Prior records reviewed and summed up: [x ] YES [ ] No LABS:                        8.0    10.22 )-----------( 194      ( 15 Dec 2022 14:50 )             27.8     12-15    142  |  107  |  35<H>  ----------------------------<  187<H>  4.4   |  30  |  1.03    Ca    9.3      15 Dec 2022 14:50    TPro  6.5  /  Alb  2.5<L>  /  TBili  0.4  /  DBili  x   /  AST  21  /  ALT  8<L>  /  AlkPhos  75  12-15         CAPILLARY BLOOD GLUCOSE    RADIOLOGY & ADDITIONAL TESTS:  < from: TTE Echo Complete w/o Contrast w/ Doppler (11.29.22 @ 08:20) >    Summary:   1. Technically difficult study with poor endocardial visualization.   2. Normal global left ventricular systolic function.   3. Left ventricular ejection fraction, by visual estimation, is 50 to   55%.   4. Normal left ventricular internal cavity size.   5. Moderate upper septal hypertrophy (1.3 cm). Abnormal septal motion   which may be due to conduction delay. Mild inferior wall hypokinesis.   6. Normal right ventricular size and function.   7. Possible moderator band (fibrinous band) visualized along the right   ventricle apex.   8. The left atrium is not well visualized, appears dilated.   9. The right atrium is not well visualized, appears generally normal in   size.  10. Mitral annular calcification present. There appears to be possible   mitral valve repair/prosthesis (images not well visualized).  11. Mild to moderate mitral valve regurgitation.  12. Mild tricuspid regurgitation.  13. There is a bioprosthetic valve in the aortic position with peak   velocity within normal limits. Mild paravalvular regurgitation.  14. Top normal sized proximal ascending aorta (3.6 cm).  15. Thereis no evidence of pericardial effusion.    < end of copied text >    ECG in V4-V6, II    Consultant(s) Notes Reviewed:  [x ] YES  [ ] NO  Care Discussed with Consultants/Other Providers [ x] YES  [ ] NO  Imaging Personally Reviewed:  [x ] YES  [ ] NO  Prior records reviewed and summed up: [x ] YES [ ] No

## 2022-12-15 NOTE — H&P ADULT - NSICDXPASTSURGICALHX_GEN_ALL_CORE_FT
PAST SURGICAL HISTORY:  History of repair of hip fracture     S/P ORIF (open reduction internal fixation) fracture

## 2022-12-15 NOTE — ED PROVIDER NOTE - ATTENDING APP SHARED VISIT CONTRIBUTION OF CARE
87F with AF on Eliquis, HTN, COPD, CHFpEF, pulmonary HTN, dementia from [facility] living. Patient sent in by Dr. Santos for evaluation of her decreasing hemoglobin.  Per chart review patient's hemoglobin was 9.5 in July and is now 8.0.  Patient was admitted at [place] 1 month ago for wound care and at her that time hemoglobin was 8.3.  Negative occult last visit but Dr. Santos states positive occult in his office.  dr. santos would like pt admitted for gi evaluation. of note pt found to be RSV positive and sating 93% on RA  Dr. Barrientos:  I have reviewed and discussed with the PA/ resident the case specifics, including the history, physical assessment, evaluation, conclusion, laboratory results, and medical plan. I agree with the contents, and conclusions. I have personally examined, and interviewed the patient.

## 2022-12-15 NOTE — H&P ADULT - NSHPSOCIALHISTORY_GEN_ALL_CORE
daughter reports that pt lives at [facility].  daughter says pt was never a smoker.  Unable to fully obtain due to pt dementia - cannot get family history due to dementia.

## 2022-12-16 DIAGNOSIS — R93.2 ABNORMAL FINDINGS ON DIAGNOSTIC IMAGING OF LIVER AND BILIARY TRACT: ICD-10-CM

## 2022-12-16 DIAGNOSIS — D64.9 ANEMIA, UNSPECIFIED: ICD-10-CM

## 2022-12-16 LAB
ABO RH CONFIRMATION: SIGNIFICANT CHANGE UP
ABO RH CONFIRMATION: SIGNIFICANT CHANGE UP
HCT VFR BLD CALC: 27.7 % — LOW (ref 34.5–45)
HGB BLD-MCNC: 8.5 G/DL — LOW (ref 11.5–15.5)
MCHC RBC-ENTMCNC: 27.5 PG — SIGNIFICANT CHANGE UP (ref 27–34)
MCHC RBC-ENTMCNC: 30.7 GM/DL — LOW (ref 32–36)
MCV RBC AUTO: 89.6 FL — SIGNIFICANT CHANGE UP (ref 80–100)
NRBC # BLD: 0 /100 WBCS — SIGNIFICANT CHANGE UP (ref 0–0)
PLATELET # BLD AUTO: 188 K/UL — SIGNIFICANT CHANGE UP (ref 150–400)
RBC # BLD: 3.09 M/UL — LOW (ref 3.8–5.2)
RBC # FLD: 15.9 % — HIGH (ref 10.3–14.5)
WBC # BLD: 7.73 K/UL — SIGNIFICANT CHANGE UP (ref 3.8–10.5)
WBC # FLD AUTO: 7.73 K/UL — SIGNIFICANT CHANGE UP (ref 3.8–10.5)

## 2022-12-16 PROCEDURE — 71260 CT THORAX DX C+: CPT | Mod: 26

## 2022-12-16 PROCEDURE — 99233 SBSQ HOSP IP/OBS HIGH 50: CPT

## 2022-12-16 PROCEDURE — 74177 CT ABD & PELVIS W/CONTRAST: CPT | Mod: 26

## 2022-12-16 RX ORDER — PANTOPRAZOLE SODIUM 20 MG/1
40 TABLET, DELAYED RELEASE ORAL
Refills: 0 | Status: DISCONTINUED | OUTPATIENT
Start: 2022-12-16 | End: 2022-12-25

## 2022-12-16 RX ADMIN — Medication 100 MILLIGRAM(S): at 00:23

## 2022-12-16 RX ADMIN — Medication 100 MILLIGRAM(S): at 18:08

## 2022-12-16 RX ADMIN — Medication 100 MILLIGRAM(S): at 12:50

## 2022-12-16 RX ADMIN — ZINC SULFATE TAB 220 MG (50 MG ZINC EQUIVALENT) 220 MILLIGRAM(S): 220 (50 ZN) TAB at 12:51

## 2022-12-16 RX ADMIN — BUDESONIDE AND FORMOTEROL FUMARATE DIHYDRATE 2 PUFF(S): 160; 4.5 AEROSOL RESPIRATORY (INHALATION) at 09:15

## 2022-12-16 RX ADMIN — DEXTROSE MONOHYDRATE, SODIUM CHLORIDE, AND POTASSIUM CHLORIDE 75 MILLILITER(S): 50; .745; 4.5 INJECTION, SOLUTION INTRAVENOUS at 00:23

## 2022-12-16 RX ADMIN — Medication 50 MILLIGRAM(S): at 06:04

## 2022-12-16 RX ADMIN — Medication 50 MILLIGRAM(S): at 18:08

## 2022-12-16 RX ADMIN — PANTOPRAZOLE SODIUM 40 MILLIGRAM(S): 20 TABLET, DELAYED RELEASE ORAL at 21:32

## 2022-12-16 RX ADMIN — PANTOPRAZOLE SODIUM 40 MILLIGRAM(S): 20 TABLET, DELAYED RELEASE ORAL at 06:53

## 2022-12-16 RX ADMIN — Medication 100 MILLIGRAM(S): at 06:05

## 2022-12-16 RX ADMIN — Medication 100 MILLIGRAM(S): at 06:04

## 2022-12-16 RX ADMIN — Medication 100 MILLIGRAM(S): at 21:14

## 2022-12-16 RX ADMIN — Medication 650 MILLIGRAM(S): at 14:30

## 2022-12-16 RX ADMIN — BUDESONIDE AND FORMOTEROL FUMARATE DIHYDRATE 2 PUFF(S): 160; 4.5 AEROSOL RESPIRATORY (INHALATION) at 21:06

## 2022-12-16 RX ADMIN — Medication 500 MILLIGRAM(S): at 12:50

## 2022-12-16 RX ADMIN — ATENOLOL 50 MILLIGRAM(S): 25 TABLET ORAL at 06:54

## 2022-12-16 RX ADMIN — Medication 650 MILLIGRAM(S): at 15:00

## 2022-12-16 NOTE — CONSULT NOTE ADULT - SUBJECTIVE AND OBJECTIVE BOX
INTERVAL HPI/OVERNIGHT EVENTS:  HPI:  88 yo female with PMH A Fib on eliquis, HTN, COPD, HFpEF, advanced dementia, recently admitted -2022 for non healing right ankle wound w/ concern for sepsis, presents at the request of PCP due to +fecal occult and Hemoglobin of 7.4 at [facility].  Pt unable to provide a history due to dementia. Patient continues to call out "help." She does not indicate any specific complaints. She has a cough.     G I Consult for anemia, heme + stool. Patient seen and examined at bed side. Patient in awake, verbal , confused and forgetful has history of Dementia. Unable to obtain information from patient.  Information from chart, Aid at bed side and RN. Called her daughter Yassine Junior  left message on her voice mail. Spoke with her daughter Yassine Junior her call # [phone] Her History include Uterine cancer S/P NEO and radiation in  and     MEDICATIONS  (STANDING):  allopurinol 100 milliGRAM(s) Oral daily  ascorbic acid 500 milliGRAM(s) Oral daily  atenolol  Tablet 50 milliGRAM(s) Oral daily  benzonatate 100 milliGRAM(s) Oral three times a day  budesonide 160 MICROgram(s)/formoterol 4.5 MICROgram(s) Inhaler 2 Puff(s) Inhalation two times a day  flecainide 50 milliGRAM(s) Oral every 12 hours  pantoprazole  Injectable 40 milliGRAM(s) IV Push two times a day  sodium chloride 0.9% with potassium chloride 20 mEq/L 1000 milliLiter(s) (75 mL/Hr) IV Continuous <Continuous>  zinc sulfate 220 milliGRAM(s) Oral daily    MEDICATIONS  (PRN):  acetaminophen     Tablet .. 650 milliGRAM(s) Oral every 6 hours PRN Temp greater or equal to 38C (100.4F), Mild Pain (1 - 3)  aluminum hydroxide/magnesium hydroxide/simethicone Suspension 30 milliLiter(s) Oral every 4 hours PRN Dyspepsia  guaiFENesin Oral Liquid (Sugar-Free) 100 milliGRAM(s) Oral every 6 hours PRN Cough  melatonin 3 milliGRAM(s) Oral at bedtime PRN Insomnia  ondansetron Injectable 4 milliGRAM(s) IV Push every 8 hours PRN Nausea and/or Vomiting  oxycodone    5 mG/acetaminophen 325 mG 0.5 Tablet(s) Oral every 8 hours PRN Severe Pain (7 - 10)      Allergies    lisinopril (Unknown)  penicillin (Unknown)  Pineapple (Unknown)    Intolerances        PAST MEDICAL & SURGICAL HISTORY:  HTN (hypertension)      Atrial fibrillation      COPD without exacerbation      Gout      Pulmonary hypertension      Acute combined systolic and diastolic congestive heart failure      S/P ORIF (open reduction internal fixation) fracture      History of repair of hip fracture  	    PHYSICAL EXAM:   Vital Signs:  Vital Signs Last 24 Hrs  T(C): 37.5 (16 Dec 2022 06:03), Max: 37.8 (15 Dec 2022 21:06)  T(F): 99.5 (16 Dec 2022 06:03), Max: 100 (15 Dec 2022 21:06)  HR: 73 (16 Dec 2022 09:17) (62 - 84)  BP: 157/55 (16 Dec 2022 06:03) (134/70 - 159/68)  BP(mean): --  RR: 18 (16 Dec 2022 06:03) (18 - 19)  SpO2: 95% (16 Dec 2022 09:17) (93% - 98%)    Parameters below as of 16 Dec 2022 09:17  Patient On (Oxygen Delivery Method): nasal cannula      Daily Height in cm: 170.18 (15 Dec 2022 21:06)    Daily Weight in k.7 (15 Dec 2022 21:06)I&O's Summary    15 Dec 2022 07:01  -  16 Dec 2022 07:00  --------------------------------------------------------  IN: 591 mL / OUT: 2 mL / NET: 589 mL        GENERAL:  Appears stated age,  no distress  HEENT:  NC/AT,  conjunctivae clear and pink   CHEST:  Full & symmetric excursion, no increased effort, breath sounds clear  HEART:  Regular rhythm, S1, S2,  no edema  ABDOMEN:  Soft, non-tender, non-distended, normoactive bowel sounds.   EXTREMITIES  no edema  SKIN:  warm/dry  NEURO:  Alert, confused       LABS:                        8.5    7.73  )-----------( 188      ( 16 Dec 2022 08:57 )             27.7     12-15    142  |  107  |  35<H>  ----------------------------<  187<H>  4.4   |  30  |  1.03    Ca    9.3      15 Dec 2022 14:50    TPro  6.5  /  Alb  2.5<L>  /  TBili  0.4  /  DBili  x   /  AST  21  /  ALT  8<L>  /  AlkPhos  75  12-15        amylase   lipase  RADIOLOGY & ADDITIONAL TESTS:  ACC: [id number] EXAM:  CT ABDOMEN AND PELVIS                          PROCEDURE DATE:  12/15/2022          INTERPRETATION:  CLINICAL INFORMATION: Anemia    COMPARISON: None.    CONTRAST/COMPLICATIONS:  IV Contrast: NONE  Oral Contrast: NONE  Complications: None reported at time of study completion    PROCEDURE:  CT of the Abdomen and Pelvis was performed.  Sagittal and coronal reformats were performed.    FINDINGS:  LOWER CHEST: Status post TAVR. Bibasilar consolidative nodules measuring   up to 3.0 x 2.8 cm in the left lower lobe. Small hiatal hernia.    LIVER: A 2.3 cm indeterminate hypodense lesion in segment 5.  BILE DUCTS: Normal caliber.  GALLBLADDER: Cholelithiasis.  SPLEEN: Within normal limits.  PANCREAS: Atrophic.  ADRENALS: Within normal limits.  KIDNEYS/URETERS: Mild left hydroureteronephrosis. The distal ureters   obscured by extensive pelvic streak artifact.    BLADDER: Within normal limits.  REPRODUCTIVE ORGANS: Obscured by pelvic streak artifact.    BOWEL: No bowel obstruction. Appendix is normal. Moderate fecal load in   the colon.  PERITONEUM: No ascites.  VESSELS: Atherosclerotic changes.  RETROPERITONEUM/LYMPH NODES: No lymphadenopathy.  ABDOMINAL WALL: Within normal limits.  BONES: Degenerative changes. Extensive pelvic streak artifact due to   bilateral total hip arthroplasty is an right femoral cerclage wires.    IMPRESSION:  Indeterminant hepatic lesion. Both benign and neoplastic (metastatic)   etiologies must be considered. Evaluation is limited by lack of IV   contrast. Further imaging can be performed with liver mass protocol CT or   contrast-enhanced MRI.    Multiple bibasilar consolidative pulmonary nodules for which the   differential includes pneumonia and metastases. CT of the chest can be   performed to determine extent of disease.    Left hydroureteronephrosis. Distal left ureter is obscured by streak   artifact. Cannot exclude obstructing lesion.    --- End of Report ---            FLAKO JACKSON MD; Attending Radiologist  This document has been electronically signed. Dec 15 2022  8:45PM

## 2022-12-16 NOTE — DIETITIAN INITIAL EVALUATION ADULT - NSFNSGIIOFT_GEN_A_CORE
12-15-22 @ 07:01  -  12-16-22 @ 07:00  --------------------------------------------------------  OUT:    Stool (mL): 2 mL  Total OUT: 2 mL    Total NET: -2 mL

## 2022-12-16 NOTE — PHYSICAL THERAPY INITIAL EVALUATION ADULT - PERTINENT HX OF CURRENT PROBLEM, REHAB EVAL
88 yo female with PMH A Fib on eliquis, HTN, COPD, HFpEF, advanced dementia, recently admitted Nov 11/28-11/30/2022 for non healing right ankle wound w/ concern for sepsis, presents at the request of PCP due to +fecal occult and Hemoglobin of 7.4 at [facility].  Pt unable to provide a history due to dementia. Patient continues to call out "help." She does not indicate any specific complaints. She has a cough.

## 2022-12-16 NOTE — PROGRESS NOTE ADULT - SUBJECTIVE AND OBJECTIVE BOX
Patient is a 87y old  Female who presents with a chief complaint of Anemia (16 Dec 2022 10:08)      Patient seen and examined at bedside. Patient sleepy, family at bedside. Patient denies complaints. No overnight events.     ALLERGIES:  lisinopril (Unknown)  penicillin (Unknown)  Pineapple (Unknown)    MEDICATIONS  (STANDING):  allopurinol 100 milliGRAM(s) Oral daily  ascorbic acid 500 milliGRAM(s) Oral daily  atenolol  Tablet 50 milliGRAM(s) Oral daily  benzonatate 100 milliGRAM(s) Oral three times a day  budesonide 160 MICROgram(s)/formoterol 4.5 MICROgram(s) Inhaler 2 Puff(s) Inhalation two times a day  flecainide 50 milliGRAM(s) Oral every 12 hours  pantoprazole  Injectable 40 milliGRAM(s) IV Push two times a day  sodium chloride 0.9% with potassium chloride 20 mEq/L 1000 milliLiter(s) (75 mL/Hr) IV Continuous <Continuous>  zinc sulfate 220 milliGRAM(s) Oral daily    MEDICATIONS  (PRN):  acetaminophen     Tablet .. 650 milliGRAM(s) Oral every 6 hours PRN Temp greater or equal to 38C (100.4F), Mild Pain (1 - 3)  aluminum hydroxide/magnesium hydroxide/simethicone Suspension 30 milliLiter(s) Oral every 4 hours PRN Dyspepsia  guaiFENesin Oral Liquid (Sugar-Free) 100 milliGRAM(s) Oral every 6 hours PRN Cough  melatonin 3 milliGRAM(s) Oral at bedtime PRN Insomnia  ondansetron Injectable 4 milliGRAM(s) IV Push every 8 hours PRN Nausea and/or Vomiting  oxycodone    5 mG/acetaminophen 325 mG 0.5 Tablet(s) Oral every 8 hours PRN Severe Pain (7 - 10)    Vital Signs Last 24 Hrs  T(F): 99.5 (16 Dec 2022 06:03), Max: 100 (15 Dec 2022 21:06)  HR: 73 (16 Dec 2022 09:17) (62 - 84)  BP: 157/55 (16 Dec 2022 06:03) (134/70 - 159/68)  RR: 18 (16 Dec 2022 06:03) (18 - 19)  SpO2: 95% (16 Dec 2022 09:17) (93% - 98%)  I&O's Summary    15 Dec 2022 07:01  -  16 Dec 2022 07:00  --------------------------------------------------------  IN: 591 mL / OUT: 2 mL / NET: 589 mL      PHYSICAL EXAM:  General: NAD, sleepy, elderly   ENT: dry MM, no oral thrush   Neck: Supple, No JVD  Lungs: Clear to auscultation bilaterally, non labored breathing  Cardio: RRR, S1/S2, No murmurs  Abdomen: Soft, Nontender, Nondistended; Bowel sounds present  Extremities: No calf tenderness, No pitting edema    LABS:                        8.5    7.73  )-----------( 188      ( 16 Dec 2022 08:57 )             27.7     12-15    142  |  107  |  35  ----------------------------<  187  4.4   |  30  |  1.03    Ca    9.3      15 Dec 2022 14:50    TPro  6.5  /  Alb  2.5  /  TBili  0.4  /  DBili  x   /  AST  21  /  ALT  8   /  AlkPhos  75  12-15                                    COVID-19 PCR: NotDetec (11-28-22 @ 11:43)      RADIOLOGY & ADDITIONAL TESTS:  < from: CT Abdomen and Pelvis No Cont (12.15.22 @ 19:08) >  IMPRESSION:  Indeterminant hepatic lesion. Both benign and neoplastic (metastatic)   etiologies must be considered. Evaluation is limited by lack of IV   contrast. Further imaging can be performed with liver mass protocol CT or   contrast-enhanced MRI.    Multiple bibasilar consolidative pulmonary nodules for which the   differential includes pneumonia and metastases. CT of the chest can be   performed to determine extent of disease.    Left hydroureteronephrosis. Distal left ureter is obscured by streak   artifact. Cannot exclude obstructing lesion.    --- End of Report ---            FLAKO JACKSON MD; Attending Radiologist  This document has been electronically signed. Dec 15 2022  8:45PM    < end of copied text >  < from: US Duplex Venous Lower Ext Complete, Bilateral (12.15.22 @ 18:44) >  IMPRESSION:  No evidence of deep venous thrombosis in the left lower extremity.    No evidence of deep venous thrombosis in the right lower extremity to the  level of the mid femoral vein.  Distal right femoral, popliteal, and calf veins were not imaged, as   patient unable to tolerate the remainder of the examination.          --- End of Report ---            PAUL ALLISON MD; Attending Radiologist  This document has been electronically signed. Dec 15 2022  6:48PM    < end of copied text >    `  Care Discussed with Consultants/Other Providers:

## 2022-12-16 NOTE — ADVANCED PRACTICE NURSE CONSULT - ASSESSMENT
Patient admitted for anemia, + fecal occult blood; poor historian with advanced dementia.  Patient known to writer from last admission, has contractures of lower extremities.  Presents with several pressure injuries to lower extremities and a sacral wound..  The Right medial malleolus has an unstageable wound, 3 x 2 x 0.3 cm, with soft, wet, firmly adherent, yellow slough. Periwound with slight blanchable erythema.  The right posterior heel has an unstageable wound with firm black eschar, no separation, fluctuance, edema or drainage noted, 2 x 3 cm. Periwound intact.  The right great medial toe has an unstageable tan, dry eschar, stable without fluctuance, edema, drainage or separation, 1 x 2 cm with intact periwound.  The sacral wound is likely both pressure & moisture associated. The wound in 6 x 4 cm of non blanchable erythema, with an opening in the skin centrally measuring 3 x 2 cm. The open area is covered with white slough, making this wound unstageable. There are areas around the opening, within the erythema, of macerated, denuded skin.

## 2022-12-16 NOTE — DIETITIAN INITIAL EVALUATION ADULT - NSPROEDAABILITYLEARNOTHER_GEN_A_NUR
cognitive limitations Consent (Marginal Mandibular)/Introductory Paragraph: The rationale for Mohs was explained to the patient and consent was obtained. The risks, benefits and alternatives to therapy were discussed in detail. Specifically, the risks of damage to the marginal mandibular branch of the facial nerve, infection, scarring, bleeding, prolonged wound healing, incomplete removal, allergy to anesthesia, and recurrence were addressed. Prior to the procedure, the treatment site was clearly identified and confirmed by the patient. All components of Universal Protocol/PAUSE Rule completed.

## 2022-12-16 NOTE — DIETITIAN INITIAL EVALUATION ADULT - PERTINENT LABORATORY DATA
12-15    142  |  107  |  35<H>  ----------------------------<  187<H>  4.4   |  30  |  1.03    Ca    9.3      15 Dec 2022 14:50    TPro  6.5  /  Alb  2.5<L>  /  TBili  0.4  /  DBili  x   /  AST  21  /  ALT  8<L>  /  AlkPhos  75  12-15

## 2022-12-16 NOTE — PHYSICAL THERAPY INITIAL EVALUATION ADULT - ADDITIONAL COMMENTS
Unable to obtain information from patient due to dementia. Information regarding patient obtained from daughter (Sandi) who was present at the time of evaluation. Patient was wheelchair bound and required assistance to transfer from surface to surface, non-ambulatory. Patient from retirement with assistance provided with mobility and ADLs.

## 2022-12-16 NOTE — PATIENT PROFILE ADULT - NSPROMEDSBROUGHTTOHOSP_GEN_A_NUR
Anesthesia Pre-Procedure Evaluation    Patient: Kandis Katz   MRN: [id number] : 1978        Preoperative Diagnosis: Chest skin lesion [L98.9]  Benign skin lesion of thigh [L98.9]   Procedure : Procedure(s):  Excision of right chest lesion  Excision of left thigh lesion     Past Medical History:   Diagnosis Date     Chest skin lesion 06/10/2021    incision and drainage of right chest lesion     HTN (hypertension) 3/8/2011     Infertility associated with anovulation      Migraine      Polycystic ovarian syndrome      PONV (postoperative nausea and vomiting)      Psoriasis     with psoriatic arthritis     STD (sexually transmitted disease)       Past Surgical History:   Procedure Laterality Date     ARTHROSCOPY KNEE WITH MENISCAL REPAIR Right 10/2/2020    Procedure: right knee arthroscopy, partial medial meniscectomy;  Surgeon: Saurabh Decker DO;  Location: [facility] OR      SECTION       DILATE CERVIX, HYSTEROSCOPY, ABLATE ENDOMETRIUM, COMBINED N/A 2015    Procedure: COMBINED DILATE CERVIX, HYSTEROSCOPY, ABLATE ENDOMETRIUM;  Surgeon: Shade Maldonado MD;  Location: [facility] OR     DILATION AND CURETTAGE, HYSTEROSCOPY DIAGNOSTIC, COMBINED       INCISION AND DRAINAGE TRUNK, COMBINED N/A 6/10/2021    Procedure: Incision and drainage right  chest lesion;  Surgeon: Torrey Vega MD;  Location: [facility] OR     leep x2       REPAIR HAMMER TOE BILATERAL Bilateral 2016    Procedure: REPAIR HAMMER TOE BILATERAL;  Surgeon: Juarez Cruz DPM;  Location: [facility] OR     TONSILLECTOMY & ADENOIDECTOMY      Tonsillitis      Allergies   Allergen Reactions     Lisinopril Cough     Seafood Swelling     Levaquin [Levofloxacin] Cramps      Social History     Tobacco Use     Smoking status: Current Some Day Smoker     Packs/day: 0.50     Years: 15.00     Pack years: 7.50     Types: Cigarettes     Smokeless tobacco: Never Used     Tobacco comment: declines quitplan referral 6/15/2021   Substance Use Topics     Alcohol use:  Yes     Comment: 2 beers twice a week      Wt Readings from Last 1 Encounters:   06/15/21 69.9 kg (154 lb)        Anesthesia Evaluation   Pt has had prior anesthetic. Type: Regional, MAC and General.    History of anesthetic complications  - PONV.      ROS/MED HX  ENT/Pulmonary:     (+) BALA risk factors, hypertension, tobacco use, Current use,     Neurologic:     (+) migraines,     Cardiovascular:     (+) hypertension-----    METS/Exercise Tolerance: >4 METS    Hematologic:  - neg hematologic  ROS     Musculoskeletal: Comment: Psoriatic arthritis  (+) arthritis,     GI/Hepatic:     (+) GERD, Asymptomatic on medication,     Renal/Genitourinary:  - neg Renal ROS     Endo:  - neg endo ROS     Psychiatric/Substance Use:     (+) psychiatric history anxiety and depression     Infectious Disease:  - neg infectious disease ROS     Malignancy:  - neg malignancy ROS     Other:  - neg other ROS          Physical Exam    Airway        Mallampati: III   TM distance: > 3 FB   Neck ROM: full   Mouth opening: > 3 cm    Respiratory Devices and Support         Dental  no notable dental history         Cardiovascular   cardiovascular exam normal          Pulmonary   pulmonary exam normal                OUTSIDE LABS:  CBC:   Lab Results   Component Value Date    WBC 5.2 09/18/2020    WBC 11.8 (H) 10/15/2015    HGB 15.0 09/18/2020    HGB 13.5 10/15/2015    HCT 41.6 09/18/2020    HCT 39.8 10/15/2015     09/18/2020     10/15/2015     BMP:   Lab Results   Component Value Date     09/28/2020     (L) 09/18/2020    POTASSIUM 4.0 09/28/2020    POTASSIUM 3.7 09/18/2020    CHLORIDE 105 09/28/2020    CHLORIDE 94 09/18/2020    CO2 24 09/28/2020    CO2 28 09/18/2020    BUN 13 09/28/2020    BUN 5 (L) 09/18/2020    CR 0.76 09/28/2020    CR 0.67 09/18/2020    GLC 87 10/23/2020     (H) 09/28/2020     COAGS: No results found for: PTT, INR, FIBR  POC:   Lab Results   Component Value Date    HCG Negative 09/18/2020      HEPATIC:   Lab Results   Component Value Date    ALBUMIN 4.1 03/27/2014    PROTTOTAL 7.9 03/27/2014    ALT 40 03/27/2014    AST 20 03/27/2014    ALKPHOS 83 03/27/2014    BILITOTAL 0.5 03/27/2014     OTHER:   Lab Results   Component Value Date    KELSEY 9.2 09/28/2020    LIPASE 136 03/27/2014    AMYLASE 52 03/27/2014    CRP 3.3 (H) 03/27/2014    SED 9 03/27/2014       Anesthesia Plan    ASA Status:  2   NPO Status:  NPO Appropriate    Anesthesia Type: General.   Induction: Intravenous, Propofol.   Maintenance: TIVA.        Consents    Anesthesia Plan(s) and associated risks, benefits, and realistic alternatives discussed. Questions answered and patient/representative(s) expressed understanding.     - Discussed with:  Patient         Postoperative Care    Pain management: IV analgesics.        Comments:    Risks and benefits of MAC anesthetic discussed including dental damage, aspiration, loss of airway, conversion to general anesthetic, CV complications, MI, stroke, death. Pt wishes to proceed.             Lj Magallon, KIP CRNA   no

## 2022-12-16 NOTE — PATIENT PROFILE ADULT - FALL HARM RISK - HARM RISK INTERVENTIONS
Assistance with ambulation/Assistance OOB with selected safe patient handling equipment/Communicate Risk of Fall with Harm to all staff/Discuss with provider need for PT consult/Monitor gait and stability/Provide patient with walking aids - walker, cane, crutches/Reinforce activity limits and safety measures with patient and family/Tailored Fall Risk Interventions/Visual Cue: Yellow wristband and red socks/Bed in lowest position, wheels locked, appropriate side rails in place/Call bell, personal items and telephone in reach/Instruct patient to call for assistance before getting out of bed or chair/Non-slip footwear when patient is out of bed/[place] to call system/Physically safe environment - no spills, clutter or unnecessary equipment/Purposeful Proactive Rounding/Room/bathroom lighting operational, light cord in reach [employer] Counseling:  I discussed with the patient the risks of mycophenolate mofetil including but not limited to infection/immunosuppression, GI upset, hypokalemia, hypercholesterolemia, bone marrow suppression, lymphoproliferative disorders, malignancy, GI ulceration/bleed/perforation, colitis, interstitial lung disease, kidney failure, progressive multifocal leukoencephalopathy, and birth defects.  The patient understands that monitoring is required including a baseline creatinine and regular CBC testing. In addition, patient must alert us immediately if symptoms of infection or other concerning signs are noted.

## 2022-12-16 NOTE — ADVANCED PRACTICE NURSE CONSULT - RECOMMEDATIONS
Suggest daily normal saline cleanse of all wounds, pat dry.   Apply Cavillon film barrier to all periwounds daily.  Paint right great toe and heel eschars with Betadine daily, allow to dry. Leave open to air. May be covered for comfort/protection with dry gauze & tracy wrap only (no foam).  Apply Santyl daily to the right medial malleolus wound bed, cover with foam dressing.  Apply Santyl to the open area only of sacral wound daily, (after cavilon is applied) cover entire wound with large foam dressing.  Offload all bony prominences with strict Turn & Position at least every 2 hours.  Pillows may be required between legs to offload contracted areas.  Use heel booties at all times in bed.  Nutritional support per Dietician's recommendations.  Consider Podiatry consult for foot/ankle wounds.

## 2022-12-16 NOTE — DIETITIAN INITIAL EVALUATION ADULT - OTHER INFO
88 yo female with PMH A Fib on eliquis, AV stenosis s/p TAVR, HTN, asthma, HFpEF, pulmonary HTN, dementia, recently admitted Nov 11/28-11/30/2022 for non healing right ankle wound w/ concern for sepsis, presents at the request of PCP due to +fecal occult and hemoglobin of 7.4 at snf. CT abd/pelvis w/ non contrast pending. GI consulted.    Per nurse, patient is NPO for CT scan. +2 generalized edema noted at left leg; right leg. last BM noted on 12/16. Skin: Rt mid ankle (unstageable), Rt great toe(unstageable), Rt heel (unstageable), Sacrum (unstageable). Pineapple allergy noted. Recommend continue with ascorbic acid and zinc sulfate supplementation and suggest adding MVI to aid in wound healing.

## 2022-12-16 NOTE — PHYSICAL THERAPY INITIAL EVALUATION ADULT - GENERAL OBSERVATIONS, REHAB EVAL
Pt received supine in bed, +IV, cardiac monitor, 2L of O2 via NC, a&o to self, dementia, private aide by side.

## 2022-12-16 NOTE — PROGRESS NOTE ADULT - ASSESSMENT
86 yo female with PMH A Fib on eliquis, AV stenosis s/p TAVR, HTN, asthma, HFpEF, pulmonary HTN, dementia, recently admitted Nov 11/28-11/30/2022 for non healing right ankle wound w/ concern for sepsis, presents at the request of PCP due to +fecal occult and hemoglobin of 7.4 at group home. CT abd/pelvis w/ non contrast pending. GI consulted.    # Progressive Normocytic anemia, possible due to GI bleed  #Hepatic lesion and pulmonary nodules seen on CT  Fecal occult was negative on 11/28, though pt reported to be guaiac positive prior to presentation.   - hgb improved today   - active type and screen  -FU fecal occult  - PPI BID  - f/u H. Pylori stool antigen  - GI recs  - advance diet  - DC tele   -CT abdomen and pelvis and chest w IV contrast  - pt will not be able to consent to colonoscopy - prior discussed pros/cons with daughter of having procedures, what to do w/ the results, risk of having a procedure with underlying co-morbidities, lack of patient cooperation. She will think about how aggressive she would want to be.   - blood consent in chart.    # Cough secondary to RSV  # COPD without exacerbation   - pt is currently 95% on 2l, CXR without obvious infiltrate (personal read), f/u final read  - cough medicine   - albuterol  - On Breo-Ellipta, symbicort while admitted.  - continuous pulse ox    #Pre-renal azotemia  #CKD st. III  - s/p IVF  - Holding diuretics for now   - Repeat BMP in AM    # RLE edema  -NO DVT  - was on AC as an OP.    #Multiple skin wounds - present on admission  #Nonhealing right medial malleolar wound, unstageable   - skin tear right forearm, right heel unstageable pressure wound, unstageable buttock wound and on right great toe.  - skin wounds do not appear infected.  -previous wound care orders from facility  - saw wound care at last hospitalization, reconsult   - nutritional support w/ vitamins     #Chronic A-Fib on Eliquis  #Chronic diastolic heart failure (E 50-55% 11/29/22)  # HTN  #Bioprosthetic AV  - Continue home meds - flecainide and atenolol  - hold Eliquis in setting of anemia possible slow GI bleed     # Abnormal EKG   - possible limb lead reversal.   - repeat, f/u    #Gout  - Allopurinol    #Advanced Dementia  - Supportive care  - Frequent reorientation  - turn patient    DNR/DNI - confirmed with daughter  Updated HCP/daughter Sandi Metzger at bedside home phone preferred [phone]. Alternate cell # is [phone]  dvt ppx: hold eliquis, CLOVERs.

## 2022-12-16 NOTE — CONSULT NOTE ADULT - ASSESSMENT
88 yo female with PMH A Fib on eliquis, HTN, COPD, HFpEF, advanced dementia, recently admitted Nov 11/28-11/30/2022 for non healing right ankle wound w/ concern for sepsis.  G I Consulted for anemia and Heme +stool.

## 2022-12-16 NOTE — CONSULT NOTE ADULT - SUBJECTIVE AND OBJECTIVE BOX
88 yo female with PMH A Fib on eliquis, HTN, COPD, HFpEF, advanced dementia, recently admitted -2022 for non healing right ankle wound w/ concern for sepsis, presents at the request of PCP due to +fecal occult and Hemoglobin of 7.4 at [facility].  Pt unable to provide a history due to dementia. Patient continues to call out "help." She does not indicate any specific complaints. She has a cough.     G I Consult for anemia, heme + stool. Patient seen and examined at bed side. Patient in awake, verbal , confused and forgetful has history of Dementia. Unable to obtain information from patient.  Information from chart, Aid at bed side and RN. Called her daughter Yassine Junior  left message on her voice mail. Spoke with her daughter Yassine Junior her call # [phone] Her History include Uterine cancer S/P ENO and radiation in 1970 and   INTERVAL HPI/OVERNIGHT EVENTS:      MEDICATIONS  (STANDING):  allopurinol 100 milliGRAM(s) Oral daily  ascorbic acid 500 milliGRAM(s) Oral daily  atenolol  Tablet 50 milliGRAM(s) Oral daily  benzonatate 100 milliGRAM(s) Oral three times a day  budesonide 160 MICROgram(s)/formoterol 4.5 MICROgram(s) Inhaler 2 Puff(s) Inhalation two times a day  flecainide 50 milliGRAM(s) Oral every 12 hours  pantoprazole    Tablet 40 milliGRAM(s) Oral two times a day  zinc sulfate 220 milliGRAM(s) Oral daily    MEDICATIONS  (PRN):  acetaminophen     Tablet .. 650 milliGRAM(s) Oral every 6 hours PRN Temp greater or equal to 38C (100.4F), Mild Pain (1 - 3)  aluminum hydroxide/magnesium hydroxide/simethicone Suspension 30 milliLiter(s) Oral every 4 hours PRN Dyspepsia  guaiFENesin Oral Liquid (Sugar-Free) 100 milliGRAM(s) Oral every 6 hours PRN Cough  melatonin 3 milliGRAM(s) Oral at bedtime PRN Insomnia  ondansetron Injectable 4 milliGRAM(s) IV Push every 8 hours PRN Nausea and/or Vomiting  oxycodone    5 mG/acetaminophen 325 mG 0.5 Tablet(s) Oral every 8 hours PRN Severe Pain (7 - 10)      Allergies    lisinopril (Unknown)  penicillin (Unknown)  Pineapple (Unknown)    Intolerances        PAST MEDICAL & SURGICAL HISTORY:  HTN (hypertension)      Atrial fibrillation      COPD without exacerbation      Gout      Pulmonary hypertension      Acute combined systolic and diastolic congestive heart failure      S/P ORIF (open reduction internal fixation) fracture      History of repair of hip fracture      PHYSICAL EXAM:   Vital Signs:  Vital Signs Last 24 Hrs  T(C): 37.5 (16 Dec 2022 06:03), Max: 37.8 (15 Dec 2022 21:06)  T(F): 99.5 (16 Dec 2022 06:03), Max: 100 (15 Dec 2022 21:06)  HR: 73 (16 Dec 2022 09:17) (62 - 84)  BP: 157/55 (16 Dec 2022 06:03) (134/70 - 159/68)  BP(mean): --  RR: 18 (16 Dec 2022 06:03) (18 - 19)  SpO2: 95% (16 Dec 2022 09:17) (93% - 98%)    Parameters below as of 16 Dec 2022 09:17  Patient On (Oxygen Delivery Method): nasal cannula      Daily Height in cm: 170.18 (15 Dec 2022 21:06)    Daily Weight in k.7 (15 Dec 2022 21:06)I&O's Summary    15 Dec 2022 07:01  -  16 Dec 2022 07:00  --------------------------------------------------------  IN: 591 mL / OUT: 2 mL / NET: 589 mL    GENERAL:  Appears stated age,  no distress  HEENT:  NC/AT,  conjunctivae clear and pink   CHEST:  Full & symmetric excursion, no increased effort, breath sounds clear  HEART:  Regular rhythm, S1, S2,  no edema  ABDOMEN:  Soft, non-tender, non-distended, normoactive bowel sounds.   EXTREMITIES  no edema  SKIN:  warm/dry  NEURO:  Alert, confused     LABS:                        8.5    7.73  )-----------( 188      ( 16 Dec 2022 08:57 )             27.7     12-15    142  |  107  |  35<H>  ----------------------------<  187<H>  4.4   |  30  |  1.03    Ca    9.3      15 Dec 2022 14:50    TPro  6.5  /  Alb  2.5<L>  /  TBili  0.4  /  DBili  x   /  AST  21  /  ALT  8<L>  /  AlkPhos  75  12-15        amylase   lipase  RADIOLOGY & ADDITIONAL TESTS:ACC: [id number] EXAM:  CT ABDOMEN AND PELVIS                          PROCEDURE DATE:  12/15/2022          INTERPRETATION:  CLINICAL INFORMATION: Anemia    COMPARISON: None.    CONTRAST/COMPLICATIONS:  IV Contrast: NONE  Oral Contrast: NONE  Complications: None reported at time of study completion    PROCEDURE:  CT of the Abdomen and Pelvis was performed.  Sagittal and coronal reformats were performed.    FINDINGS:  LOWER CHEST: Status post TAVR. Bibasilar consolidative nodules measuring   up to 3.0 x 2.8 cm in the left lower lobe. Small hiatal hernia.    LIVER: A 2.3 cm indeterminate hypodense lesion in segment 5.  BILE DUCTS: Normal caliber.  GALLBLADDER: Cholelithiasis.  SPLEEN: Within normal limits.  PANCREAS: Atrophic.  ADRENALS: Within normal limits.  KIDNEYS/URETERS: Mild left hydroureteronephrosis. The distal ureters   obscured by extensive pelvic streak artifact.    BLADDER: Within normal limits.  REPRODUCTIVE ORGANS: Obscured by pelvic streak artifact.    BOWEL: No bowel obstruction. Appendix is normal. Moderate fecal load in   the colon.  PERITONEUM: No ascites.  VESSELS: Atherosclerotic changes.  RETROPERITONEUM/LYMPH NODES: No lymphadenopathy.  ABDOMINAL WALL: Within normal limits.  BONES: Degenerative changes. Extensive pelvic streak artifact due to   bilateral total hip arthroplasty is an right femoral cerclage wires.    IMPRESSION:  Indeterminant hepatic lesion. Both benign and neoplastic (metastatic)   etiologies must be considered. Evaluation is limited by lack of IV   contrast. Further imaging can be performed with liver mass protocol CT or   contrast-enhanced MRI.    Multiple bibasilar consolidative pulmonary nodules for which the   differential includes pneumonia and metastases. CT of the chest can be   performed to determine extent of disease.    Left hydroureteronephrosis. Distal left ureter is obscured by streak   artifact. Cannot exclude obstructing lesion.    --- End of Report ---            FLAKO JACKSON MD; Attending Radiologist  This document has been electronically signed. Dec 15 2022  8:45PM   88 yo female with PMH A Fib on eliquis, HTN, COPD, HFpEF, advanced dementia, recently admitted -2022 for non healing right ankle wound w/ concern for sepsis, presents at the request of PCP due to +fecal occult and Hemoglobin of 7.4 at [facility].  Pt unable to provide a history due to dementia. Patient continues to call out "help." She does not indicate any specific complaints. She has a cough.     G I Consult for anemia, heme + stool. Patient seen and examined at bed side. Patient in awake, verbal , confused and forgetful has history of Dementia. Unable to obtain information from patient.  Information from chart, Aid at bed side and RN.   Called her daughter Yassine Junior  left message on her voice mail. Spoke with her daughter Yassine Junior her cell # [phone]. Patient history include Uterine cancer S/P NEO and radiation in  and Bioprosthetic AV.   She had colonoscopy long time unsure of the result. Her daughter agree with repeating the CT scan to assess the Liver lesion.   Explained to her in detail of colonoscopy. She express good understanding. Her daughter will help her with colonoscopy prep and will give consent.     MEDICATIONS  (STANDING):  allopurinol 100 milliGRAM(s) Oral daily  ascorbic acid 500 milliGRAM(s) Oral daily  atenolol  Tablet 50 milliGRAM(s) Oral daily  benzonatate 100 milliGRAM(s) Oral three times a day  budesonide 160 MICROgram(s)/formoterol 4.5 MICROgram(s) Inhaler 2 Puff(s) Inhalation two times a day  flecainide 50 milliGRAM(s) Oral every 12 hours  pantoprazole    Tablet 40 milliGRAM(s) Oral two times a day  zinc sulfate 220 milliGRAM(s) Oral daily    MEDICATIONS  (PRN):  acetaminophen     Tablet .. 650 milliGRAM(s) Oral every 6 hours PRN Temp greater or equal to 38C (100.4F), Mild Pain (1 - 3)  aluminum hydroxide/magnesium hydroxide/simethicone Suspension 30 milliLiter(s) Oral every 4 hours PRN Dyspepsia  guaiFENesin Oral Liquid (Sugar-Free) 100 milliGRAM(s) Oral every 6 hours PRN Cough  melatonin 3 milliGRAM(s) Oral at bedtime PRN Insomnia  ondansetron Injectable 4 milliGRAM(s) IV Push every 8 hours PRN Nausea and/or Vomiting  oxycodone    5 mG/acetaminophen 325 mG 0.5 Tablet(s) Oral every 8 hours PRN Severe Pain (7 - 10)      Allergies    lisinopril (Unknown)  penicillin (Unknown)  Pineapple (Unknown)    Intolerances        PAST MEDICAL & SURGICAL HISTORY:  HTN (hypertension)      Atrial fibrillation      COPD without exacerbation      Gout      Pulmonary hypertension      Acute combined systolic and diastolic congestive heart failure      S/P ORIF (open reduction internal fixation) fracture      History of repair of hip fracture      PHYSICAL EXAM:   Vital Signs:  Vital Signs Last 24 Hrs  T(C): 37.5 (16 Dec 2022 06:03), Max: 37.8 (15 Dec 2022 21:06)  T(F): 99.5 (16 Dec 2022 06:03), Max: 100 (15 Dec 2022 21:06)  HR: 73 (16 Dec 2022 09:17) (62 - 84)  BP: 157/55 (16 Dec 2022 06:03) (134/70 - 159/68)  BP(mean): --  RR: 18 (16 Dec 2022 06:03) (18 - 19)  SpO2: 95% (16 Dec 2022 09:17) (93% - 98%)    Parameters below as of 16 Dec 2022 09:17  Patient On (Oxygen Delivery Method): nasal cannula      Daily Height in cm: 170.18 (15 Dec 2022 21:06)    Daily Weight in k.7 (15 Dec 2022 21:06)I&O's Summary    15 Dec 2022 07:01  -  16 Dec 2022 07:00  --------------------------------------------------------  IN: 591 mL / OUT: 2 mL / NET: 589 mL    GENERAL:  Appears stated age,  no distress  HEENT:  NC/AT,  conjunctivae clear and pink   CHEST:  Full & symmetric excursion, no increased effort, breath sounds clear  HEART:  Regular rhythm, S1, S2,  no edema  ABDOMEN:  Soft, non-tender, non-distended, normoactive bowel sounds.   EXTREMITIES  no edema  SKIN:  warm/dry  NEURO:  Alert, confused     LABS:                        8.5    7.73  )-----------( 188      ( 16 Dec 2022 08:57 )             27.7     12-15    142  |  107  |  35<H>  ----------------------------<  187<H>  4.4   |  30  |  1.03    Ca    9.3      15 Dec 2022 14:50    TPro  6.5  /  Alb  2.5<L>  /  TBili  0.4  /  DBili  x   /  AST  21  /  ALT  8<L>  /  AlkPhos  75  12-15        amylase   lipase  RADIOLOGY & ADDITIONAL TESTS:ACC: [id number] EXAM:  CT ABDOMEN AND PELVIS                          PROCEDURE DATE:  12/15/2022          INTERPRETATION:  CLINICAL INFORMATION: Anemia    COMPARISON: None.    CONTRAST/COMPLICATIONS:  IV Contrast: NONE  Oral Contrast: NONE  Complications: None reported at time of study completion    PROCEDURE:  CT of the Abdomen and Pelvis was performed.  Sagittal and coronal reformats were performed.    FINDINGS:  LOWER CHEST: Status post TAVR. Bibasilar consolidative nodules measuring   up to 3.0 x 2.8 cm in the left lower lobe. Small hiatal hernia.    LIVER: A 2.3 cm indeterminate hypodense lesion in segment 5.  BILE DUCTS: Normal caliber.  GALLBLADDER: Cholelithiasis.  SPLEEN: Within normal limits.  PANCREAS: Atrophic.  ADRENALS: Within normal limits.  KIDNEYS/URETERS: Mild left hydroureteronephrosis. The distal ureters   obscured by extensive pelvic streak artifact.    BLADDER: Within normal limits.  REPRODUCTIVE ORGANS: Obscured by pelvic streak artifact.    BOWEL: No bowel obstruction. Appendix is normal. Moderate fecal load in   the colon.  PERITONEUM: No ascites.  VESSELS: Atherosclerotic changes.  RETROPERITONEUM/LYMPH NODES: No lymphadenopathy.  ABDOMINAL WALL: Within normal limits.  BONES: Degenerative changes. Extensive pelvic streak artifact due to   bilateral total hip arthroplasty is an right femoral cerclage wires.    IMPRESSION:  Indeterminant hepatic lesion. Both benign and neoplastic (metastatic)   etiologies must be considered. Evaluation is limited by lack of IV   contrast. Further imaging can be performed with liver mass protocol CT or   contrast-enhanced MRI.    Multiple bibasilar consolidative pulmonary nodules for which the   differential includes pneumonia and metastases. CT of the chest can be   performed to determine extent of disease.    Left hydroureteronephrosis. Distal left ureter is obscured by streak   artifact. Cannot exclude obstructing lesion.    --- End of Report ---            FLAKO JACKSON MD; Attending Radiologist  This document has been electronically signed. Dec 15 2022  8:45PM

## 2022-12-16 NOTE — DIETITIAN INITIAL EVALUATION ADULT - ADD RECOMMEND
1) Recommend continue with ascorbic acid and zinc sulfate supplementation and suggest adding MVI to aid in wound healing.   2) Advance diet when medically feasible  3) Monitor daily PO intakes and weights

## 2022-12-16 NOTE — DIETITIAN INITIAL EVALUATION ADULT - PERTINENT MEDS FT
MEDICATIONS  (STANDING):  allopurinol 100 milliGRAM(s) Oral daily  ascorbic acid 500 milliGRAM(s) Oral daily  atenolol  Tablet 50 milliGRAM(s) Oral daily  benzonatate 100 milliGRAM(s) Oral three times a day  budesonide 160 MICROgram(s)/formoterol 4.5 MICROgram(s) Inhaler 2 Puff(s) Inhalation two times a day  flecainide 50 milliGRAM(s) Oral every 12 hours  pantoprazole    Tablet 40 milliGRAM(s) Oral two times a day  zinc sulfate 220 milliGRAM(s) Oral daily    MEDICATIONS  (PRN):  acetaminophen     Tablet .. 650 milliGRAM(s) Oral every 6 hours PRN Temp greater or equal to 38C (100.4F), Mild Pain (1 - 3)  aluminum hydroxide/magnesium hydroxide/simethicone Suspension 30 milliLiter(s) Oral every 4 hours PRN Dyspepsia  guaiFENesin Oral Liquid (Sugar-Free) 100 milliGRAM(s) Oral every 6 hours PRN Cough  melatonin 3 milliGRAM(s) Oral at bedtime PRN Insomnia  ondansetron Injectable 4 milliGRAM(s) IV Push every 8 hours PRN Nausea and/or Vomiting  oxycodone    5 mG/acetaminophen 325 mG 0.5 Tablet(s) Oral every 8 hours PRN Severe Pain (7 - 10)

## 2022-12-16 NOTE — CONSULT NOTE ADULT - ASSESSMENT
88 yo female with PMH A Fib on eliquis, HTN, COPD, HFpEF, advanced dementia, recently admitted Nov 11/28-11/30/2022 for non healing right ankle wound w/ concern for sepsis.  G I Consulted for anemia and Heme +stool.  86 yo female with PMH A Fib on eliquis, HTN, COPD, HFpEF, advanced dementia, Uterine cancer in 1970 S/P NEO and radiation, Prosthetic AV  G I Consulted for anemia and Heme +stool. CT Abdomen resulted Indeterminant hepatic lesion.

## 2022-12-17 LAB
ANION GAP SERPL CALC-SCNC: 10 MMOL/L — SIGNIFICANT CHANGE UP (ref 5–17)
BUN SERPL-MCNC: 28 MG/DL — HIGH (ref 7–23)
CALCIUM SERPL-MCNC: 8.7 MG/DL — SIGNIFICANT CHANGE UP (ref 8.4–10.5)
CHLORIDE SERPL-SCNC: 110 MMOL/L — HIGH (ref 96–108)
CO2 SERPL-SCNC: 28 MMOL/L — SIGNIFICANT CHANGE UP (ref 22–31)
CREAT SERPL-MCNC: 0.81 MG/DL — SIGNIFICANT CHANGE UP (ref 0.5–1.3)
EGFR: 70 ML/MIN/1.73M2 — SIGNIFICANT CHANGE UP
GLUCOSE SERPL-MCNC: 108 MG/DL — HIGH (ref 70–99)
HCT VFR BLD CALC: 27.2 % — LOW (ref 34.5–45)
HGB BLD-MCNC: 8.2 G/DL — LOW (ref 11.5–15.5)
LACTATE SERPL-SCNC: 0.7 MMOL/L — SIGNIFICANT CHANGE UP (ref 0.7–2)
MCHC RBC-ENTMCNC: 27.3 PG — SIGNIFICANT CHANGE UP (ref 27–34)
MCHC RBC-ENTMCNC: 30.1 GM/DL — LOW (ref 32–36)
MCV RBC AUTO: 90.7 FL — SIGNIFICANT CHANGE UP (ref 80–100)
NRBC # BLD: 0 /100 WBCS — SIGNIFICANT CHANGE UP (ref 0–0)
OB PNL STL: NEGATIVE — SIGNIFICANT CHANGE UP
PLATELET # BLD AUTO: 201 K/UL — SIGNIFICANT CHANGE UP (ref 150–400)
POTASSIUM SERPL-MCNC: 3.5 MMOL/L — SIGNIFICANT CHANGE UP (ref 3.5–5.3)
POTASSIUM SERPL-SCNC: 3.5 MMOL/L — SIGNIFICANT CHANGE UP (ref 3.5–5.3)
PROCALCITONIN SERPL-MCNC: 1.14 NG/ML — HIGH
RBC # BLD: 3 M/UL — LOW (ref 3.8–5.2)
RBC # FLD: 16.4 % — HIGH (ref 10.3–14.5)
SODIUM SERPL-SCNC: 148 MMOL/L — HIGH (ref 135–145)
WBC # BLD: 8.54 K/UL — SIGNIFICANT CHANGE UP (ref 3.8–10.5)
WBC # FLD AUTO: 8.54 K/UL — SIGNIFICANT CHANGE UP (ref 3.8–10.5)

## 2022-12-17 PROCEDURE — 99233 SBSQ HOSP IP/OBS HIGH 50: CPT

## 2022-12-17 RX ORDER — SODIUM CHLORIDE 9 MG/ML
1000 INJECTION, SOLUTION INTRAVENOUS
Refills: 0 | Status: DISCONTINUED | OUTPATIENT
Start: 2022-12-17 | End: 2022-12-21

## 2022-12-17 RX ADMIN — ATENOLOL 50 MILLIGRAM(S): 25 TABLET ORAL at 07:11

## 2022-12-17 RX ADMIN — BUDESONIDE AND FORMOTEROL FUMARATE DIHYDRATE 2 PUFF(S): 160; 4.5 AEROSOL RESPIRATORY (INHALATION) at 09:07

## 2022-12-17 RX ADMIN — Medication 650 MILLIGRAM(S): at 07:12

## 2022-12-17 RX ADMIN — Medication 100 MILLIGRAM(S): at 13:13

## 2022-12-17 RX ADMIN — BUDESONIDE AND FORMOTEROL FUMARATE DIHYDRATE 2 PUFF(S): 160; 4.5 AEROSOL RESPIRATORY (INHALATION) at 21:41

## 2022-12-17 RX ADMIN — Medication 500 MILLIGRAM(S): at 13:12

## 2022-12-17 RX ADMIN — ZINC SULFATE TAB 220 MG (50 MG ZINC EQUIVALENT) 220 MILLIGRAM(S): 220 (50 ZN) TAB at 13:13

## 2022-12-17 RX ADMIN — Medication 50 MILLIGRAM(S): at 17:27

## 2022-12-17 RX ADMIN — PANTOPRAZOLE SODIUM 40 MILLIGRAM(S): 20 TABLET, DELAYED RELEASE ORAL at 17:27

## 2022-12-17 RX ADMIN — PANTOPRAZOLE SODIUM 40 MILLIGRAM(S): 20 TABLET, DELAYED RELEASE ORAL at 07:12

## 2022-12-17 RX ADMIN — Medication 100 MILLIGRAM(S): at 07:12

## 2022-12-17 RX ADMIN — Medication 50 MILLIGRAM(S): at 07:11

## 2022-12-17 RX ADMIN — Medication 650 MILLIGRAM(S): at 08:12

## 2022-12-17 RX ADMIN — SODIUM CHLORIDE 60 MILLILITER(S): 9 INJECTION, SOLUTION INTRAVENOUS at 17:27

## 2022-12-17 RX ADMIN — Medication 100 MILLIGRAM(S): at 07:11

## 2022-12-17 NOTE — PROGRESS NOTE ADULT - ASSESSMENT
86 yo female with PMH A Fib on eliquis, HTN, COPD, HFpEF, advanced dementia, Uterine cancer in 1970 S/P NEO and radiation, Prosthetic AV  seen by GI for anemia and Heme +stool. CT Abdomen resulted Indeterminant hepatic lesion.

## 2022-12-17 NOTE — PROGRESS NOTE ADULT - ASSESSMENT
88 yo female with PMH A Fib on eliquis, AV stenosis s/p TAVR, HTN, asthma, HFpEF, pulmonary HTN, dementia, recently admitted Nov 11/28-11/30/2022 for non healing right ankle wound w/ concern for sepsis, presents at the request of PCP due to +fecal occult and hemoglobin of 7.4 at [facility].     #Progressive Normocytic anemia, possible due to GI bleed  #Hepatic lesion and pulmonary nodules seen on CT  Fecal occult was negative on 11/28, though pt reported to be guaiac positive prior to presentation.   - H/H stable, 8.2/27, pt is s/p 1 UNIT OF PRBC TRANSFUSION  - PPI BID  - f/u H. Pylori stool antigen  - GI recs  -CT abdomen and pelvis and chest w IV contrast: Hepatic lesion, b/l lower lobe opacities concerning for pneumonia  - pt will not be able to consent to colonoscopy - prior discussed pros/cons with daughter of having procedures, what to do w/ the results, risk of having a procedure with underlying co-morbidities, lack of patient cooperation. She will think about how aggressive she would want to be.     # Cough secondary to RSV  # COPD without exacerbation   - pt is currently 95% on 2l, CXR without obvious infiltrate (personal read), f/u final read  - cough medicine   - albuterol  - On Breo-Ellipta, symbicort while admitted.  - continuous pulse ox    #Pre-renal azotemia  #CKD st. III  - s/p IVF  - Holding diuretics for now   - Repeat BMP in AM    # RLE edema  -NO DVT  - was on AC as an OP.    #Multiple skin wounds - present on admission  #Nonhealing right medial malleolar wound, unstageable   - skin tear right forearm, right heel unstageable pressure wound, unstageable buttock wound and on right great toe.  - skin wounds do not appear infected.  -previous wound care orders from facility  - saw wound care at last hospitalization, reconsult   - nutritional support w/ vitamins     #Chronic A-Fib on Eliquis  #Chronic diastolic heart failure (E 50-55% 11/29/22)  # HTN  #Bioprosthetic AV  - Continue home meds - flecainide and atenolol  - hold Eliquis in setting of anemia possible slow GI bleed     # Abnormal EKG   - possible limb lead reversal.   - repeat, f/u    #Gout  - Allopurinol    #Advanced Dementia  - Supportive care  - Frequent reorientation  - turn patient    DNR/DNI - confirmed with daughter  Updated HCP/daughter Sandi Metzger at bedside home phone preferred [phone]. Alternate cell # is [phone]  dvt ppx: hold eliquis, SCDs.       86 yo female with PMH A Fib on eliquis, AV stenosis s/p TAVR, HTN, asthma, HFpEF, pulmonary HTN, dementia, recently admitted Nov 11/28-11/30/2022 for non healing right ankle wound w/ concern for sepsis, presents at the request of PCP due to +fecal occult and hemoglobin of 7.4 at care home.     #Normocytic anemia, possible due to GI bleed  #Hepatic lesion and pulmonary nodules seen on CT  - Fecal occult was negative on 11/28, though pt reported to be guaiac positive prior to admission  - H/H stable, 8.2/27, pt is s/p 1 UNIT OF PRBC TRANSFUSION  - cont PPI BID  - f/u H. Pylori stool antigen  - GI recs noted; trend LFT's, H/H - for possible Colonoscopy/EGD  - pt will not be able to consent to colonoscopy - prior discussed pros/cons with daughter of having procedures, what to do w/ the results, risk of having a procedure with underlying co-morbidities, lack of patient cooperation. She will think about how aggressive she would want to be.     #Fevers, probable PNA  -elevated procalc, tmax 102  -CT of chest with bl lower lobe opacities (12/16)  -started on Levaquin (allergy to PCN)  -cont O2, currently on 3L nc, O2 sats 96%---check RA sats today  -f/u blood cultures x 2 sent today  -pt had an Echocardiogram on 11/29:  no effusions, EF 50>55%, mild to mod mitral valve regur    # RSV  # COPD without exacerbation   - cont cough suppressant prn  - On Breo-Ellipta, symbicort while admitted and albuterol  - continuous pulse ox    #Pre-renal azotemia  #CKD3  #Hypernatremia, dehydration  - start D5 IVF today  - Holding diuretics for now   - Repeat BMP in AM    # RLE edema  - NO DVT  - was on AC as an OP.    #Multiple skin wounds - present on admission  #Nonhealing right medial malleolar wound, unstageable   - skin tear right forearm, right heel unstageable pressure wound, unstageable buttock wound and on right great toe.  - skin wounds do not appear infected  - previous wound care orders from facility  - nutritional support w/ vitamins     #Chronic A-Fib on Eliquis  #Chronic diastolic heart failure (E 50-55% 11/29/22)  #HTN  #Bioprosthetic AV  - Continue home meds - flecainide and atenolol  - hold Eliquis in setting of anemia possible slow GI bleed     # Abnormal EKG   - possible limb lead reversal.   - repeat EKG    #Gout  - Allopurinol    #Advanced Dementia  - Supportive care  - Frequent reorientation    GOC:  DNR/DNI    Dispo:  Updated HCP/daughter Sandi Metzger  home phone preferred [phone]. Alternate cell # is [phone]    DVT  ppx: hold eliquis, SCDs.

## 2022-12-17 NOTE — PROGRESS NOTE ADULT - SUBJECTIVE AND OBJECTIVE BOX
INTERVAL HPI/OVERNIGHT EVENTS:  HPI:  86 yo female with PMH A Fib on eliquis, HTN, COPD, HFpEF, advanced dementia.   G I Consult for anemia, heme + stool. Patient seen and examined at bed side. Patient in awake, verbal , confused and forgetful has history of Dementia. Unable to obtain information from patient.  Information from chart, Aid at bed side and RN. Patient had fever during the night. BM  no Melena noted          MEDICATIONS  (STANDING):  allopurinol 100 milliGRAM(s) Oral daily  ascorbic acid 500 milliGRAM(s) Oral daily  atenolol  Tablet 50 milliGRAM(s) Oral daily  benzonatate 100 milliGRAM(s) Oral three times a day  budesonide 160 MICROgram(s)/formoterol 4.5 MICROgram(s) Inhaler 2 Puff(s) Inhalation two times a day  dextrose 5%. 1000 milliLiter(s) (60 mL/Hr) IV Continuous <Continuous>  flecainide 50 milliGRAM(s) Oral every 12 hours  levoFLOXacin IVPB 750 milliGRAM(s) IV Intermittent every 48 hours  pantoprazole    Tablet 40 milliGRAM(s) Oral two times a day  zinc sulfate 220 milliGRAM(s) Oral daily    MEDICATIONS  (PRN):  acetaminophen     Tablet .. 650 milliGRAM(s) Oral every 6 hours PRN Temp greater or equal to 38C (100.4F), Mild Pain (1 - 3)  aluminum hydroxide/magnesium hydroxide/simethicone Suspension 30 milliLiter(s) Oral every 4 hours PRN Dyspepsia  guaiFENesin Oral Liquid (Sugar-Free) 100 milliGRAM(s) Oral every 6 hours PRN Cough  melatonin 3 milliGRAM(s) Oral at bedtime PRN Insomnia  ondansetron Injectable 4 milliGRAM(s) IV Push every 8 hours PRN Nausea and/or Vomiting  oxycodone    5 mG/acetaminophen 325 mG 0.5 Tablet(s) Oral every 8 hours PRN Severe Pain (7 - 10)      Allergies    lisinopril (Unknown)  penicillin (Unknown)  Pineapple (Unknown)    Intolerances        PAST MEDICAL & SURGICAL HISTORY:  HTN (hypertension)      Atrial fibrillation      COPD without exacerbation      Gout      Pulmonary hypertension      Acute combined systolic and diastolic congestive heart failure      S/P ORIF (open reduction internal fixation) fracture      History of repair of hip fracture          REVIEW OF SYSTEMS    GENERAL:  Appears stated age,  no distress  HEENT:  NC/AT,  conjunctivae clear and pink   CHEST:  Full & symmetric excursion, no increased effort, breath sounds clear  HEART:  Regular rhythm, S1, S2,  no edema  ABDOMEN:  Soft, non-tender, non-distended, normoactive bowel sounds.   EXTREMITIES  no edema  SKIN:  warm/dry  NEURO:  Alert, confused         PHYSICAL EXAM:   Vital Signs:  Vital Signs Last 24 Hrs  T(C): 38.9 (17 Dec 2022 07:05), Max: 38.9 (17 Dec 2022 07:05)  T(F): 102 (17 Dec 2022 07:05), Max: 102 (17 Dec 2022 07:05)  HR: 75 (17 Dec 2022 09:00) (66 - 78)  BP: 162/84 (17 Dec 2022 07:05) (153/47 - 162/84)  BP(mean): --  RR: 18 (17 Dec 2022 07:05) (18 - 19)  SpO2: 95% (17 Dec 2022 09:00) (95% - 100%)    Parameters below as of 17 Dec 2022 09:00  Patient On (Oxygen Delivery Method): nasal cannula      Daily     Daily Weight in k.3 (17 Dec 2022 10:37)I&O's Summary        LABS:                        8.2    8.54  )-----------( 201      ( 17 Dec 2022 05:45 )             27.2     12-17    148<H>  |  110<H>  |  28<H>  ----------------------------<  108<H>  3.5   |  28  |  0.81    Ca    8.7      17 Dec 2022 05:45    TPro  6.5  /  Alb  2.5<L>  /  TBili  0.4  /  DBili  x   /  AST  21  /  ALT  8<L>  /  AlkPhos  75  12-15          RADIOLOGY & ADDITIONAL TESTS:

## 2022-12-17 NOTE — PROVIDER CONTACT NOTE (OTHER) - ASSESSMENT
pt has an oxygen 3L NC. sat 95-96% noted. lung sounds congestion. pt has a moist cough frequently noed.

## 2022-12-17 NOTE — PROGRESS NOTE ADULT - SUBJECTIVE AND OBJECTIVE BOX
Patient is a 87y old  Female who presents with a chief complaint of Anemia     (16 Dec 2022 13:24)      Patient seen and examined at bedside. No overnight events reported.     ALLERGIES:  lisinopril (Unknown)  penicillin (Unknown)  Pineapple (Unknown)    MEDICATIONS  (STANDING):  allopurinol 100 milliGRAM(s) Oral daily  ascorbic acid 500 milliGRAM(s) Oral daily  atenolol  Tablet 50 milliGRAM(s) Oral daily  benzonatate 100 milliGRAM(s) Oral three times a day  budesonide 160 MICROgram(s)/formoterol 4.5 MICROgram(s) Inhaler 2 Puff(s) Inhalation two times a day  flecainide 50 milliGRAM(s) Oral every 12 hours  pantoprazole    Tablet 40 milliGRAM(s) Oral two times a day  zinc sulfate 220 milliGRAM(s) Oral daily    MEDICATIONS  (PRN):  acetaminophen     Tablet .. 650 milliGRAM(s) Oral every 6 hours PRN Temp greater or equal to 38C (100.4F), Mild Pain (1 - 3)  aluminum hydroxide/magnesium hydroxide/simethicone Suspension 30 milliLiter(s) Oral every 4 hours PRN Dyspepsia  guaiFENesin Oral Liquid (Sugar-Free) 100 milliGRAM(s) Oral every 6 hours PRN Cough  melatonin 3 milliGRAM(s) Oral at bedtime PRN Insomnia  ondansetron Injectable 4 milliGRAM(s) IV Push every 8 hours PRN Nausea and/or Vomiting  oxycodone    5 mG/acetaminophen 325 mG 0.5 Tablet(s) Oral every 8 hours PRN Severe Pain (7 - 10)    Vital Signs Last 24 Hrs  T(F): 102 (17 Dec 2022 07:05), Max: 102 (17 Dec 2022 07:05)  HR: 66 (16 Dec 2022 21:20) (66 - 78)  BP: 153/47 (16 Dec 2022 21:20) (153/47 - 153/47)  RR: 19 (16 Dec 2022 21:20) (19 - 19)  SpO2: 96% (16 Dec 2022 21:20) (95% - 100%)  I&O's Summary    PHYSICAL EXAM:  General: NAD, A/O x 3  ENT: No gross hearing impairment, Moist mucous membranes, no thrush  Neck: Supple, No JVD  Lungs: Clear to auscultation bilaterally, good air entry, non-labored breathing  Cardio: RRR, S1/S2, No murmur  Abdomen: Soft, Nontender, Nondistended; Bowel sounds present  Extremities: No calf tenderness, No cyanosis, No pitting edema  Psych: Appropriate mood and affect    LABS:                        8.2    8.54  )-----------( 201      ( 17 Dec 2022 05:45 )             27.2     12-17    148  |  110  |  28  ----------------------------<  108  3.5   |  28  |  0.81    Ca    8.7      17 Dec 2022 05:45    TPro  6.5  /  Alb  2.5  /  TBili  0.4  /  DBili  x   /  AST  21  /  ALT  8   /  AlkPhos  75  12-15                                          COVID-19 PCR: NotDetec (11-28-22 @ 11:43)    RADIOLOGY & ADDITIONAL TESTS:  < from: CT Chest w/ IV Cont (12.16.22 @ 14:23) >  IMPRESSION:  Bilateral lower lobe and lingular consolidative opacities concerning for   pneumonia. Recommend following to resolution.    Hepatic lesion without significant enhancement. In conjunction with the   the previous noncontrast study the lesion is favored to represent a cyst,   however, recommend follow-up imaging with MR of the abdomen to confirm   signal characteristics.    Stable left hydroureteronephrosis with a delayed left nephrogram. Distal   ureter is obscured by pelvic streak artifact.    < end of copied text >    Care Discussed with Consultants/Other Providers:    Patient is a 87y old  Female who presents with a chief complaint of Anemia     (16 Dec 2022 13:24)      Patient seen and examined at bedside.  Per nurse report pt has a temp of 102.  Pt offers no complaints.    ALLERGIES:  lisinopril (Unknown)  penicillin (Unknown)  Pineapple (Unknown)    MEDICATIONS  (STANDING):  allopurinol 100 milliGRAM(s) Oral daily  ascorbic acid 500 milliGRAM(s) Oral daily  atenolol  Tablet 50 milliGRAM(s) Oral daily  benzonatate 100 milliGRAM(s) Oral three times a day  budesonide 160 MICROgram(s)/formoterol 4.5 MICROgram(s) Inhaler 2 Puff(s) Inhalation two times a day  flecainide 50 milliGRAM(s) Oral every 12 hours  pantoprazole    Tablet 40 milliGRAM(s) Oral two times a day  zinc sulfate 220 milliGRAM(s) Oral daily    MEDICATIONS  (PRN):  acetaminophen     Tablet .. 650 milliGRAM(s) Oral every 6 hours PRN Temp greater or equal to 38C (100.4F), Mild Pain (1 - 3)  aluminum hydroxide/magnesium hydroxide/simethicone Suspension 30 milliLiter(s) Oral every 4 hours PRN Dyspepsia  guaiFENesin Oral Liquid (Sugar-Free) 100 milliGRAM(s) Oral every 6 hours PRN Cough  melatonin 3 milliGRAM(s) Oral at bedtime PRN Insomnia  ondansetron Injectable 4 milliGRAM(s) IV Push every 8 hours PRN Nausea and/or Vomiting  oxycodone    5 mG/acetaminophen 325 mG 0.5 Tablet(s) Oral every 8 hours PRN Severe Pain (7 - 10)    Vital Signs Last 24 Hrs  T(F): 102 (17 Dec 2022 07:05), Max: 102 (17 Dec 2022 07:05)  HR: 66 (16 Dec 2022 21:20) (66 - 78)  BP: 153/47 (16 Dec 2022 21:20) (153/47 - 153/47)  RR: 19 (16 Dec 2022 21:20) (19 - 19)  SpO2: 96% (16 Dec 2022 21:20) (95% - 100%)  I&O's Summary    PHYSICAL EXAM:  General: NAD, arousable but very sleepy.  ENT: No gross hearing impairment, Moist mucous membranes, no thrush  Neck: Supple, No JVD  Lungs: good air entry, non-labored breathing, coarse breath sounds  Cardio: RRR, S1/S2, No murmur  Abdomen: Soft, Nontender, Nondistended; Bowel sounds present  Extremities: No calf tenderness, No cyanosis, No pitting edema  Neuro:  nonfocal    LABS:                        8.2    8.54  )-----------( 201      ( 17 Dec 2022 05:45 )             27.2     12-17    148  |  110  |  28  ----------------------------<  108  3.5   |  28  |  0.81    Ca    8.7      17 Dec 2022 05:45    TPro  6.5  /  Alb  2.5  /  TBili  0.4  /  DBili  x   /  AST  21  /  ALT  8   /  AlkPhos  75  12-15          COVID-19 PCR: NotDetec (11-28-22 @ 11:43)    RADIOLOGY & ADDITIONAL TESTS:  < from: CT Chest w/ IV Cont (12.16.22 @ 14:23) >  IMPRESSION:  Bilateral lower lobe and lingular consolidative opacities concerning for   pneumonia. Recommend following to resolution.    Hepatic lesion without significant enhancement. In conjunction with the   the previous noncontrast study the lesion is favored to represent a cyst,   however, recommend follow-up imaging with MR of the abdomen to confirm   signal characteristics.    Stable left hydroureteronephrosis with a delayed left nephrogram. Distal   ureter is obscured by pelvic streak artifact.    < end of copied text >  < from: TTE Echo Complete w/o Contrast w/ Doppler (11.29.22 @ 08:20) >    Summary:   1. Technically difficult study with poor endocardial visualization.   2. Normal global left ventricular systolic function.   3. Left ventricular ejection fraction, by visual estimation, is 50 to   55%.   4. Normal left ventricular internal cavity size.   5. Moderate upper septal hypertrophy (1.3 cm). Abnormal septal motion   which may be due to conduction delay. Mild inferior wall hypokinesis.   6. Normal right ventricular size and function.   7. Possible moderator band (fibrinous band) visualized along the right   ventricle apex.   8. The left atrium is not well visualized, appears dilated.   9. The right atrium is not well visualized, appears generally normal in   size.  10. Mitral annular calcification present. There appears to be possible   mitral valve repair/prosthesis (images not well visualized).  11. Mild to moderate mitral valve regurgitation.  12. Mild tricuspid regurgitation.  13. There is a bioprosthetic valve in the aortic position with peak   velocity within normal limits. Mild paravalvular regurgitation.  14. Top normal sized proximal ascending aorta (3.6 cm).  15. Thereis no evidence of pericardial effusion.      < end of copied text >    Care Discussed with Consultants/Other Providers:

## 2022-12-18 LAB
ANION GAP SERPL CALC-SCNC: 7 MMOL/L — SIGNIFICANT CHANGE UP (ref 5–17)
BUN SERPL-MCNC: 22 MG/DL — SIGNIFICANT CHANGE UP (ref 7–23)
CALCIUM SERPL-MCNC: 8.3 MG/DL — LOW (ref 8.4–10.5)
CHLORIDE SERPL-SCNC: 106 MMOL/L — SIGNIFICANT CHANGE UP (ref 96–108)
CO2 SERPL-SCNC: 29 MMOL/L — SIGNIFICANT CHANGE UP (ref 22–31)
CREAT SERPL-MCNC: 0.97 MG/DL — SIGNIFICANT CHANGE UP (ref 0.5–1.3)
EGFR: 57 ML/MIN/1.73M2 — LOW
GLUCOSE SERPL-MCNC: 129 MG/DL — HIGH (ref 70–99)
HCT VFR BLD CALC: 26.6 % — LOW (ref 34.5–45)
HGB BLD-MCNC: 8.1 G/DL — LOW (ref 11.5–15.5)
LEGIONELLA AG UR QL: NEGATIVE — SIGNIFICANT CHANGE UP
MCHC RBC-ENTMCNC: 27.7 PG — SIGNIFICANT CHANGE UP (ref 27–34)
MCHC RBC-ENTMCNC: 30.5 GM/DL — LOW (ref 32–36)
MCV RBC AUTO: 91.1 FL — SIGNIFICANT CHANGE UP (ref 80–100)
NRBC # BLD: 0 /100 WBCS — SIGNIFICANT CHANGE UP (ref 0–0)
PLATELET # BLD AUTO: 208 K/UL — SIGNIFICANT CHANGE UP (ref 150–400)
POTASSIUM SERPL-MCNC: 3.3 MMOL/L — LOW (ref 3.5–5.3)
POTASSIUM SERPL-SCNC: 3.3 MMOL/L — LOW (ref 3.5–5.3)
RBC # BLD: 2.92 M/UL — LOW (ref 3.8–5.2)
RBC # FLD: 15.6 % — HIGH (ref 10.3–14.5)
S PNEUM AG UR QL: NEGATIVE — SIGNIFICANT CHANGE UP
SODIUM SERPL-SCNC: 142 MMOL/L — SIGNIFICANT CHANGE UP (ref 135–145)
WBC # BLD: 8.33 K/UL — SIGNIFICANT CHANGE UP (ref 3.8–10.5)
WBC # FLD AUTO: 8.33 K/UL — SIGNIFICANT CHANGE UP (ref 3.8–10.5)

## 2022-12-18 PROCEDURE — 99233 SBSQ HOSP IP/OBS HIGH 50: CPT

## 2022-12-18 RX ORDER — POTASSIUM CHLORIDE 20 MEQ
40 PACKET (EA) ORAL ONCE
Refills: 0 | Status: COMPLETED | OUTPATIENT
Start: 2022-12-18 | End: 2022-12-18

## 2022-12-18 RX ADMIN — BUDESONIDE AND FORMOTEROL FUMARATE DIHYDRATE 2 PUFF(S): 160; 4.5 AEROSOL RESPIRATORY (INHALATION) at 22:03

## 2022-12-18 RX ADMIN — Medication 100 MILLIGRAM(S): at 11:30

## 2022-12-18 RX ADMIN — Medication 650 MILLIGRAM(S): at 05:42

## 2022-12-18 RX ADMIN — ZINC SULFATE TAB 220 MG (50 MG ZINC EQUIVALENT) 220 MILLIGRAM(S): 220 (50 ZN) TAB at 11:30

## 2022-12-18 RX ADMIN — Medication 50 MILLIGRAM(S): at 17:27

## 2022-12-18 RX ADMIN — PANTOPRAZOLE SODIUM 40 MILLIGRAM(S): 20 TABLET, DELAYED RELEASE ORAL at 05:40

## 2022-12-18 RX ADMIN — Medication 650 MILLIGRAM(S): at 06:12

## 2022-12-18 RX ADMIN — ATENOLOL 50 MILLIGRAM(S): 25 TABLET ORAL at 05:40

## 2022-12-18 RX ADMIN — BUDESONIDE AND FORMOTEROL FUMARATE DIHYDRATE 2 PUFF(S): 160; 4.5 AEROSOL RESPIRATORY (INHALATION) at 08:58

## 2022-12-18 RX ADMIN — Medication 50 MILLIGRAM(S): at 05:40

## 2022-12-18 RX ADMIN — Medication 500 MILLIGRAM(S): at 11:30

## 2022-12-18 RX ADMIN — PANTOPRAZOLE SODIUM 40 MILLIGRAM(S): 20 TABLET, DELAYED RELEASE ORAL at 17:27

## 2022-12-18 RX ADMIN — Medication 40 MILLIEQUIVALENT(S): at 11:29

## 2022-12-18 NOTE — PROGRESS NOTE ADULT - SUBJECTIVE AND OBJECTIVE BOX
No events.   No BM overnight.  Tolerating diet        Review of Systems:    Unable to assess due to dementia.      Vital Signs Last 24 Hrs  T(C): 38.7 (18 Dec 2022 05:51), Max: 38.7 (18 Dec 2022 05:51)  T(F): 101.6 (18 Dec 2022 05:51), Max: 101.6 (18 Dec 2022 05:51)  HR: 81 (18 Dec 2022 05:15) (67 - 81)  BP: 144/51 (18 Dec 2022 05:15) (132/84 - 151/43)  BP(mean): --  RR: 17 (18 Dec 2022 05:15) (16 - 18)  SpO2: 95% (18 Dec 2022 05:15) (95% - 98%)    Parameters below as of 18 Dec 2022 05:15  Patient On (Oxygen Delivery Method): nasal cannula  O2 Flow (L/min): 3      PHYSICAL EXAM:    Constitutional: NAD, well-developed  Neck: No LAD, supple  Respiratory: CTA and P  Cardiovascular: S1 and S2, RRR, no M  Gastrointestinal: BS+, soft, NT/ND, neg HSM,  Extremities: No peripheral edema, neg clubing, cyanosis  Vascular: 2+ peripheral pulses    Psychiatric: Normal mood, normal affect  Skin: No rashes    MEDICATIONS  (STANDING):  allopurinol 100 milliGRAM(s) Oral daily  ascorbic acid 500 milliGRAM(s) Oral daily  atenolol  Tablet 50 milliGRAM(s) Oral daily  budesonide 160 MICROgram(s)/formoterol 4.5 MICROgram(s) Inhaler 2 Puff(s) Inhalation two times a day  dextrose 5%. 1000 milliLiter(s) (60 mL/Hr) IV Continuous <Continuous>  flecainide 50 milliGRAM(s) Oral every 12 hours  levoFLOXacin IVPB 750 milliGRAM(s) IV Intermittent every 48 hours  pantoprazole    Tablet 40 milliGRAM(s) Oral two times a day  potassium chloride    Tablet ER 40 milliEquivalent(s) Oral once  zinc sulfate 220 milliGRAM(s) Oral daily    MEDICATIONS  (PRN):  acetaminophen     Tablet .. 650 milliGRAM(s) Oral every 6 hours PRN Temp greater or equal to 38C (100.4F), Mild Pain (1 - 3)  aluminum hydroxide/magnesium hydroxide/simethicone Suspension 30 milliLiter(s) Oral every 4 hours PRN Dyspepsia  guaiFENesin Oral Liquid (Sugar-Free) 100 milliGRAM(s) Oral every 6 hours PRN Cough  melatonin 3 milliGRAM(s) Oral at bedtime PRN Insomnia  ondansetron Injectable 4 milliGRAM(s) IV Push every 8 hours PRN Nausea and/or Vomiting  oxycodone    5 mG/acetaminophen 325 mG 0.5 Tablet(s) Oral every 8 hours PRN Severe Pain (7 - 10)      Allergies    lisinopril (Unknown)  penicillin (Unknown)  Pineapple (Unknown)    Intolerances          LABS:                        8.1    8.33  )-----------( 208      ( 18 Dec 2022 06:15 )             26.6                         8.2    8.54  )-----------( 201      ( 17 Dec 2022 05:45 )             27.2                         8.5    7.73  )-----------( 188      ( 16 Dec 2022 08:57 )             27.7     12-18    142  |  106  |  22  ----------------------------<  129<H>  3.3<L>   |  29  |  0.97    Ca    8.3<L>      18 Dec 2022 06:15          LIVER FUNCTIONS - ( 15 Dec 2022 14:50 )  Alb: 2.5 g/dL / Pro: 6.5 g/dL / ALK PHOS: 75 U/L / ALT: 8 U/L / AST: 21 U/L / GGT: x           Lactate, Blood: 0.7 mmol/L (12-17-22 @ 09:36)      RADIOLOGY & ADDITIONAL TESTS:

## 2022-12-18 NOTE — PROGRESS NOTE ADULT - ASSESSMENT
88 yo female with PMH A Fib on eliquis, AV stenosis s/p TAVR, HTN, asthma, HFpEF, pulmonary HTN, dementia, recently admitted Nov 11/28-11/30/2022 for non healing right ankle wound w/ concern for sepsis, presents at the request of PCP due to +fecal occult and hemoglobin of 7.4 at [facility].     #Normocytic anemia, possible due to GI bleed  #Hepatic lesion and pulmonary nodules seen on CT  - Fecal occult was negative on 11/28, though pt reported to be guaiac positive prior to admission  - H/H stable, 8.1/26, pt is s/p 1 UNIT OF PRBC TRANSFUSION  - cont PPI BID  - H. Pylori stool antigen pending collection  - GI recs noted; trend LFT's and H/H - for possible Colonoscopy/EGD??  - pt will not be able to consent to colonoscopy - prior discussed pros/cons with daughter of having procedures, what to do w/ the results, risk of having a procedure with underlying co-morbidities, lack of patient cooperation. She will think about how aggressive she would want to be.     #Fevers, probable PNA  -pt still febrile, tmax this am 101.6-- cont tylenol prn  -elevated procalc  -CT of chest with b/l lower lobe opacities (12/16)  -started on Levaquin (allergy to PCN)  -cont O2, currently on 3L nc, O2 sats 96%---check RA sats and wean as tolerated  -f/u blood cultures x 2 sent 12/17  -wbc not elevated, lactate neg  -pt had an Echocardiogram on 11/29:  no effusions, EF 50>55%, mild to mod mitral valve regur    # RSV  # COPD without exacerbation   - cont cough suppressant prn  - On Breo-Ellipta, symbicort while admitted and albuterol  - continuous pulse ox    #Pre-renal azotemia  #CKD3  #Hypernatremia, dehydration  #Hypokalemia  - started D5 IVF and improved, replete K and repeat bmp in am  - Holding diuretics for now     # RLE edema  - NO DVT  - was on AC as an OP.    #Multiple skin wounds - present on admission  #Nonhealing right medial malleolar wound, unstageable   - skin tear right forearm, right heel unstageable pressure wound, unstageable buttock wound and on right great toe.  - skin wounds do not appear infected  - previous wound care orders from facility  - nutritional support w/ vitamins     #Chronic A-Fib on Eliquis  #Chronic diastolic heart failure (E 50-55% 11/29/22)  #HTN  #Bioprosthetic AV  - Continue home meds - flecainide and atenolol  - hold Eliquis in setting of anemia possible slow GI bleed     # Abnormal EKG   - possible limb lead reversal.   - repeat EKG    #Gout  - Allopurinol    #Advanced Dementia  - Supportive care  - Frequent reorientation    GOC:  DNR/DNI    Dispo:  Updated HCP/daughter Sandi Metzger (home phone preferred [phone]). Alternate cell # is [phone].    DVT  ppx: hold eliquis, SCDs.

## 2022-12-18 NOTE — PROGRESS NOTE ADULT - SUBJECTIVE AND OBJECTIVE BOX
Patient is a 87y old  Female who presents with a chief complaint of Anemia (18 Dec 2022 07:49)      Patient seen and examined at bedside. No overnight events reported.  Pt with no pain, c/o cough.    ALLERGIES:  lisinopril (Unknown)  penicillin (Unknown)  Pineapple (Unknown)    MEDICATIONS  (STANDING):  allopurinol 100 milliGRAM(s) Oral daily  ascorbic acid 500 milliGRAM(s) Oral daily  atenolol  Tablet 50 milliGRAM(s) Oral daily  budesonide 160 MICROgram(s)/formoterol 4.5 MICROgram(s) Inhaler 2 Puff(s) Inhalation two times a day  dextrose 5%. 1000 milliLiter(s) (60 mL/Hr) IV Continuous <Continuous>  flecainide 50 milliGRAM(s) Oral every 12 hours  levoFLOXacin IVPB 750 milliGRAM(s) IV Intermittent every 48 hours  pantoprazole    Tablet 40 milliGRAM(s) Oral two times a day  potassium chloride    Tablet ER 40 milliEquivalent(s) Oral once  zinc sulfate 220 milliGRAM(s) Oral daily    MEDICATIONS  (PRN):  acetaminophen     Tablet .. 650 milliGRAM(s) Oral every 6 hours PRN Temp greater or equal to 38C (100.4F), Mild Pain (1 - 3)  aluminum hydroxide/magnesium hydroxide/simethicone Suspension 30 milliLiter(s) Oral every 4 hours PRN Dyspepsia  guaiFENesin Oral Liquid (Sugar-Free) 100 milliGRAM(s) Oral every 6 hours PRN Cough  melatonin 3 milliGRAM(s) Oral at bedtime PRN Insomnia  ondansetron Injectable 4 milliGRAM(s) IV Push every 8 hours PRN Nausea and/or Vomiting  oxycodone    5 mG/acetaminophen 325 mG 0.5 Tablet(s) Oral every 8 hours PRN Severe Pain (7 - 10)    Vital Signs Last 24 Hrs  T(F): 101.6 (18 Dec 2022 05:51), Max: 101.6 (18 Dec 2022 05:51)  HR: 81 (18 Dec 2022 05:15) (67 - 81)  BP: 144/51 (18 Dec 2022 05:15) (132/84 - 151/43)  RR: 17 (18 Dec 2022 05:15) (16 - 18)  SpO2: 95% (18 Dec 2022 05:15) (95% - 98%)  I&O's Summary    17 Dec 2022 07:01  -  18 Dec 2022 07:00  --------------------------------------------------------  IN: 300 mL / OUT: 0 mL / NET: 300 mL      PHYSICAL EXAM:  General: NAD, more alert today.  ENT: No gross hearing impairment, Moist mucous membranes, no thrush  Neck: Supple, No JVD  Lungs: good air entry, non-labored breathing, decreased BS   Cardio: RRR, S1/S2, No murmur  Abdomen: Soft, Nontender, Nondistended; Bowel sounds present  Extremities: No calf tenderness, No cyanosis, No pitting edema  Neuro:  alert, speech fluent, nonfocal    LABS:                        8.1    8.33  )-----------( 208      ( 18 Dec 2022 06:15 )             26.6     12-18    142  |  106  |  22  ----------------------------<  129  3.3   |  29  |  0.97    Ca    8.3      18 Dec 2022 06:15    TPro  6.5  /  Alb  2.5  /  TBili  0.4  /  DBili  x   /  AST  21  /  ALT  8   /  AlkPhos  75  12-15            Lactate, Blood: 0.7 mmol/L (12-17 @ 09:36)    Procalcitonin, Serum: 1.14 ng/mL (12-17-22 @ 05:45)                              COVID-19 PCR: NotDetec (11-28-22 @ 11:43)    RADIOLOGY & ADDITIONAL TESTS:    Care Discussed with Consultants/Other Providers:

## 2022-12-19 LAB
ANION GAP SERPL CALC-SCNC: 7 MMOL/L — SIGNIFICANT CHANGE UP (ref 5–17)
BUN SERPL-MCNC: 21 MG/DL — SIGNIFICANT CHANGE UP (ref 7–23)
CALCIUM SERPL-MCNC: 8.3 MG/DL — LOW (ref 8.4–10.5)
CHLORIDE SERPL-SCNC: 107 MMOL/L — SIGNIFICANT CHANGE UP (ref 96–108)
CO2 SERPL-SCNC: 27 MMOL/L — SIGNIFICANT CHANGE UP (ref 22–31)
CREAT SERPL-MCNC: 0.81 MG/DL — SIGNIFICANT CHANGE UP (ref 0.5–1.3)
EGFR: 71 ML/MIN/1.73M2 — SIGNIFICANT CHANGE UP
GLUCOSE SERPL-MCNC: 99 MG/DL — SIGNIFICANT CHANGE UP (ref 70–99)
HCT VFR BLD CALC: 27.4 % — LOW (ref 34.5–45)
HGB BLD-MCNC: 8.2 G/DL — LOW (ref 11.5–15.5)
MCHC RBC-ENTMCNC: 27.2 PG — SIGNIFICANT CHANGE UP (ref 27–34)
MCHC RBC-ENTMCNC: 29.9 GM/DL — LOW (ref 32–36)
MCV RBC AUTO: 91 FL — SIGNIFICANT CHANGE UP (ref 80–100)
NRBC # BLD: 0 /100 WBCS — SIGNIFICANT CHANGE UP (ref 0–0)
PLATELET # BLD AUTO: 228 K/UL — SIGNIFICANT CHANGE UP (ref 150–400)
POTASSIUM SERPL-MCNC: 3.4 MMOL/L — LOW (ref 3.5–5.3)
POTASSIUM SERPL-SCNC: 3.4 MMOL/L — LOW (ref 3.5–5.3)
RBC # BLD: 3.01 M/UL — LOW (ref 3.8–5.2)
RBC # FLD: 15.6 % — HIGH (ref 10.3–14.5)
SODIUM SERPL-SCNC: 141 MMOL/L — SIGNIFICANT CHANGE UP (ref 135–145)
WBC # BLD: 8.08 K/UL — SIGNIFICANT CHANGE UP (ref 3.8–10.5)
WBC # FLD AUTO: 8.08 K/UL — SIGNIFICANT CHANGE UP (ref 3.8–10.5)

## 2022-12-19 PROCEDURE — 93010 ELECTROCARDIOGRAM REPORT: CPT

## 2022-12-19 PROCEDURE — 99233 SBSQ HOSP IP/OBS HIGH 50: CPT

## 2022-12-19 RX ORDER — POTASSIUM CHLORIDE 20 MEQ
40 PACKET (EA) ORAL ONCE
Refills: 0 | Status: COMPLETED | OUTPATIENT
Start: 2022-12-19 | End: 2022-12-19

## 2022-12-19 RX ADMIN — PANTOPRAZOLE SODIUM 40 MILLIGRAM(S): 20 TABLET, DELAYED RELEASE ORAL at 17:46

## 2022-12-19 RX ADMIN — ATENOLOL 50 MILLIGRAM(S): 25 TABLET ORAL at 05:48

## 2022-12-19 RX ADMIN — BUDESONIDE AND FORMOTEROL FUMARATE DIHYDRATE 2 PUFF(S): 160; 4.5 AEROSOL RESPIRATORY (INHALATION) at 21:54

## 2022-12-19 RX ADMIN — Medication 500 MILLIGRAM(S): at 12:53

## 2022-12-19 RX ADMIN — Medication 40 MILLIEQUIVALENT(S): at 17:46

## 2022-12-19 RX ADMIN — Medication 50 MILLIGRAM(S): at 17:46

## 2022-12-19 RX ADMIN — PANTOPRAZOLE SODIUM 40 MILLIGRAM(S): 20 TABLET, DELAYED RELEASE ORAL at 05:48

## 2022-12-19 RX ADMIN — BUDESONIDE AND FORMOTEROL FUMARATE DIHYDRATE 2 PUFF(S): 160; 4.5 AEROSOL RESPIRATORY (INHALATION) at 09:02

## 2022-12-19 RX ADMIN — ZINC SULFATE TAB 220 MG (50 MG ZINC EQUIVALENT) 220 MILLIGRAM(S): 220 (50 ZN) TAB at 12:53

## 2022-12-19 RX ADMIN — Medication 50 MILLIGRAM(S): at 05:48

## 2022-12-19 RX ADMIN — Medication 100 MILLIGRAM(S): at 12:53

## 2022-12-19 NOTE — PROGRESS NOTE ADULT - SUBJECTIVE AND OBJECTIVE BOX
Patient is a 87y old  Female who presents with a chief complaint of Anemia (18 Dec 2022 08:58)      Patient seen and examined at bedside. No overnight events reported.     ALLERGIES:  lisinopril (Unknown)  penicillin (Unknown)  Pineapple (Unknown)    MEDICATIONS  (STANDING):  allopurinol 100 milliGRAM(s) Oral daily  ascorbic acid 500 milliGRAM(s) Oral daily  atenolol  Tablet 50 milliGRAM(s) Oral daily  budesonide 160 MICROgram(s)/formoterol 4.5 MICROgram(s) Inhaler 2 Puff(s) Inhalation two times a day  dextrose 5%. 1000 milliLiter(s) (60 mL/Hr) IV Continuous <Continuous>  flecainide 50 milliGRAM(s) Oral every 12 hours  levoFLOXacin IVPB 750 milliGRAM(s) IV Intermittent every 48 hours  pantoprazole    Tablet 40 milliGRAM(s) Oral two times a day  zinc sulfate 220 milliGRAM(s) Oral daily    MEDICATIONS  (PRN):  acetaminophen     Tablet .. 650 milliGRAM(s) Oral every 6 hours PRN Temp greater or equal to 38C (100.4F), Mild Pain (1 - 3)  aluminum hydroxide/magnesium hydroxide/simethicone Suspension 30 milliLiter(s) Oral every 4 hours PRN Dyspepsia  guaiFENesin Oral Liquid (Sugar-Free) 100 milliGRAM(s) Oral every 6 hours PRN Cough  melatonin 3 milliGRAM(s) Oral at bedtime PRN Insomnia  ondansetron Injectable 4 milliGRAM(s) IV Push every 8 hours PRN Nausea and/or Vomiting  oxycodone    5 mG/acetaminophen 325 mG 0.5 Tablet(s) Oral every 8 hours PRN Severe Pain (7 - 10)    Vital Signs Last 24 Hrs  T(F): 98.7 (19 Dec 2022 05:39), Max: 99.8 (18 Dec 2022 16:19)  HR: 83 (19 Dec 2022 05:39) (70 - 83)  BP: 159/63 (19 Dec 2022 05:39) (154/48 - 159/63)  RR: 18 (19 Dec 2022 05:39) (16 - 18)  SpO2: 97% (19 Dec 2022 05:39) (95% - 97%)  I&O's Summary    PHYSICAL EXAM:  General: NAD, A/O x 3  ENT: No gross hearing impairment, Moist mucous membranes, no thrush  Neck: Supple, No JVD  Lungs: Clear to auscultation bilaterally, good air entry, non-labored breathing  Cardio: RRR, S1/S2, No murmur  Abdomen: Soft, Nontender, Nondistended; Bowel sounds present  Extremities: No calf tenderness, No cyanosis, No pitting edema  Psych: Appropriate mood and affect    LABS:                        8.1    8.33  )-----------( 208      ( 18 Dec 2022 06:15 )             26.6     12-18    142  |  106  |  22  ----------------------------<  129  3.3   |  29  |  0.97    Ca    8.3      18 Dec 2022 06:15              Lactate, Blood: 0.7 mmol/L (12-17 @ 09:36)    Procalcitonin, Serum: 1.14 ng/mL (12-17-22 @ 05:45)                              Culture - Blood (collected 17 Dec 2022 09:36)  Source: .Blood Blood-Peripheral  Preliminary Report (18 Dec 2022 15:01):    No growth to date.    Culture - Blood (collected 17 Dec 2022 09:36)  Source: .Blood Blood-Peripheral  Preliminary Report (18 Dec 2022 15:01):    No growth to date.      COVID-19 PCR: NotDetec (11-28-22 @ 11:43)    RADIOLOGY & ADDITIONAL TESTS:    Care Discussed with Consultants/Other Providers:    Patient is a 87y old  Female who presents with a chief complaint of Anemia (18 Dec 2022 08:58)      Patient seen and examined at bedside. No overnight events reported.  Pt feeling better, cough is improved.    ALLERGIES:  lisinopril (Unknown)  penicillin (Unknown)  Pineapple (Unknown)    MEDICATIONS  (STANDING):  allopurinol 100 milliGRAM(s) Oral daily  ascorbic acid 500 milliGRAM(s) Oral daily  atenolol  Tablet 50 milliGRAM(s) Oral daily  budesonide 160 MICROgram(s)/formoterol 4.5 MICROgram(s) Inhaler 2 Puff(s) Inhalation two times a day  dextrose 5%. 1000 milliLiter(s) (60 mL/Hr) IV Continuous <Continuous>  flecainide 50 milliGRAM(s) Oral every 12 hours  levoFLOXacin IVPB 750 milliGRAM(s) IV Intermittent every 48 hours  pantoprazole    Tablet 40 milliGRAM(s) Oral two times a day  zinc sulfate 220 milliGRAM(s) Oral daily    MEDICATIONS  (PRN):  acetaminophen     Tablet .. 650 milliGRAM(s) Oral every 6 hours PRN Temp greater or equal to 38C (100.4F), Mild Pain (1 - 3)  aluminum hydroxide/magnesium hydroxide/simethicone Suspension 30 milliLiter(s) Oral every 4 hours PRN Dyspepsia  guaiFENesin Oral Liquid (Sugar-Free) 100 milliGRAM(s) Oral every 6 hours PRN Cough  melatonin 3 milliGRAM(s) Oral at bedtime PRN Insomnia  ondansetron Injectable 4 milliGRAM(s) IV Push every 8 hours PRN Nausea and/or Vomiting  oxycodone    5 mG/acetaminophen 325 mG 0.5 Tablet(s) Oral every 8 hours PRN Severe Pain (7 - 10)    Vital Signs Last 24 Hrs  T(F): 98.7 (19 Dec 2022 05:39), Max: 99.8 (18 Dec 2022 16:19)  HR: 83 (19 Dec 2022 05:39) (70 - 83)  BP: 159/63 (19 Dec 2022 05:39) (154/48 - 159/63)  RR: 18 (19 Dec 2022 05:39) (16 - 18)  SpO2: 97% (19 Dec 2022 05:39) (95% - 97%)  I&O's Summary    PHYSICAL EXAM:  General: NAD, A/O x 2  ENT: No gross hearing impairment, Moist mucous membranes, no thrush  Neck: Supple, No JVD  Lungs:  good air entry, non-labored breathing, coarse breath sounds   Cardio: RRR, S1/S2, No murmur  Abdomen: Soft, Nontender, Nondistended; Bowel sounds present  Extremities: No calf tenderness, No cyanosis, No pitting edema  Skin:  multiple skin tears, fragile skin  Psych: Appropriate mood and affect    LABS:                        8.1    8.33  )-----------( 208      ( 18 Dec 2022 06:15 )             26.6     12-18    142  |  106  |  22  ----------------------------<  129  3.3   |  29  |  0.97    Ca    8.3      18 Dec 2022 06:15              Lactate, Blood: 0.7 mmol/L (12-17 @ 09:36)    Procalcitonin, Serum: 1.14 ng/mL (12-17-22 @ 05:45)                              Culture - Blood (collected 17 Dec 2022 09:36)  Source: .Blood Blood-Peripheral  Preliminary Report (18 Dec 2022 15:01):    No growth to date.    Culture - Blood (collected 17 Dec 2022 09:36)  Source: .Blood Blood-Peripheral  Preliminary Report (18 Dec 2022 15:01):    No growth to date.      COVID-19 PCR: NotDetec (11-28-22 @ 11:43)    RADIOLOGY & ADDITIONAL TESTS:    Care Discussed with Consultants/Other Providers:

## 2022-12-19 NOTE — PROGRESS NOTE ADULT - ASSESSMENT
88 yo female with PMH A Fib on eliquis, AV stenosis s/p TAVR, HTN, asthma, HFpEF, pulmonary HTN, dementia, recently admitted Nov 11/28-11/30/2022 for non healing right ankle wound w/ concern for sepsis, presents at the request of PCP due to +fecal occult and hemoglobin of 7.4 at [facility].     #Normocytic anemia, possible due to GI bleed  #Hepatic lesion and pulmonary nodules seen on CT  - Fecal occult was negative on 11/28, though pt reported to be guaiac positive prior to admission  - H/H stable, 8.1/26, pt is s/p 1 UNIT OF PRBC TRANSFUSION  - cont PPI BID  - H. Pylori stool antigen pending collection  - GI recs noted; trend LFT's and H/H - for possible Colonoscopy/EGD??  - pt will not be able to consent to colonoscopy - prior discussed pros/cons with daughter of having procedures, what to do w/ the results, risk of having a procedure with underlying co-morbidities, lack of patient cooperation. She will think about how aggressive she would want to be.     #Fevers resolved, probable PNA  -elevated procalc  -CT of chest with b/l lower lobe opacities (12/16)  -cont Levaquin (allergy to PCN)  -cont O2, currently on 2L nc, O2 sats 97%---check RA sats today and wean as tolerated  -blood cultures x 2 sent 12/17 so far NGTD  -Legionella neg  -wbc not elevated, lactate neg  -pt had an Echocardiogram on 11/29:  no effusions, EF 50>55%, mild to mod mitral valve regur    # RSV  # COPD without exacerbation   - cont cough suppressant prn  - On Breo-Ellipta, symbicort while admitted and albuterol  - continuous pulse ox    #Pre-renal azotemia  #CKD3  #Hypernatremia, dehydration  #Hypokalemia  - started D5 IVF and improved, replete K and repeat bmp in am  - Holding diuretics for now     # RLE edema  - NO DVT  - was on AC as an OP.    #Multiple skin wounds - present on admission  #Nonhealing right medial malleolar wound, unstageable   - skin tear right forearm, right heel unstageable pressure wound, unstageable buttock wound and on right great toe.  - skin wounds do not appear infected  - previous wound care orders from facility  - nutritional support w/ vitamins     #Chronic A-Fib on Eliquis  #Chronic diastolic heart failure (E 50-55% 11/29/22)  #HTN  #Bioprosthetic AV  - Continue home meds - flecainide and atenolol  - hold Eliquis in setting of anemia possible slow GI bleed     # Abnormal EKG   - possible limb lead reversal.   - repeat EKG    #Gout  - Allopurinol    #Advanced Dementia  - Supportive care  - Frequent reorientation    GOC:  DNR/DNI    Dispo:  Updated HCP/daughter Sandi Metzger (home phone preferred [phone]). Alternate cell # is [phone].    DVT  ppx: hold eliquis, SCDs.       88 yo female with PMH A Fib on eliquis, AV stenosis s/p TAVR, HTN, asthma, HFpEF, pulmonary HTN, dementia, recently admitted Nov 11/28-11/30/2022 for non healing right ankle wound w/ concern for sepsis, sent by PCP due to +fecal occult and hemoglobin of 7.4 at [facility].     #Normocytic anemia, possible due to GI bleed  #Hepatic lesion and pulmonary nodules seen on CT  - Fecal occult was negative on 11/28, though pt reported to be guaiac positive prior to admission  - H/H stable, 8.1/26, pt is s/p 1 UNIT OF PRBC TRANSFUSION  - cont PPI BID  - H. Pylori stool antigen pending collection  - GI recs noted; trend LFT's and H/H - for possible Colonoscopy/EGD??  - per discussion with daughter she is not sure she wants pt to have colonoscopy or EGD-- she is concerned about possible risks of having a procedure with underlying co-morbidities    #Fevers resolved, probable PNA  -elevated procalc  -CT of chest with b/l lower lobe opacities (12/16)  -cont Levaquin (allergy to PCN)  -cont O2, currently on 2L nc, O2 sats 97%---check RA sats today and wean as tolerated  -blood cultures x 2 sent 12/17 so far NGTD  -Legionella neg  -wbc not elevated, lactate neg  -pt had an Echocardiogram on 11/29:  no effusions, EF 50>55%, mild to mod mitral valve regur    # RSV  # COPD without exacerbation   - cont cough suppressant prn  - On Breo-Ellipta, symbicort while admitted and albuterol  - continuous pulse ox    #Pre-renal azotemia  #CKD3  #Hypernatremia, dehydration  #Hypokalemia  - started D5 IVF and improved, replete K and repeat bmp in am  - Holding diuretics for now     # RLE edema  - NO DVT  - was on AC as an OP.    #Multiple skin wounds - present on admission  #Nonhealing right medial malleolar wound, unstageable   - skin tear right forearm, right heel unstageable pressure wound, unstageable buttock wound and on right great toe.  - skin wounds do not appear infected  - previous wound care orders from facility  - nutritional support w/ vitamins     #Chronic A-Fib on Eliquis  #Chronic diastolic heart failure (E 50-55% 11/29/22)  #HTN  #Bioprosthetic AV  - Continue home meds - flecainide and atenolol  - hold Eliquis in setting of anemia possible slow GI bleed     # Abnormal EKG   - possible limb lead reversal.   - repeat EKG    #Gout  - Allopurinol    #Advanced Dementia  - Supportive care  - Frequent reorientation    GOC:  DNR/DNI    Dispo:  Updated HCP/daughter Sandi Metzger (home phone preferred [phone]). Alternate cell # is [phone].    DVT  ppx: hold eliquis, SCDs.

## 2022-12-19 NOTE — PROGRESS NOTE ADULT - SUBJECTIVE AND OBJECTIVE BOX
INTERVAL HPI/OVERNIGHT EVENTS:  HPI:  88 yo female with PMH A Fib was on eliquis, HTN, COPD, HFpEF, advanced dementia.   Pt unable to provide a history due to dementia.   Patient seen and examined at bed side. Care giver at bed side no event over night. No BM today.     MEDICATIONS  (STANDING):  allopurinol 100 milliGRAM(s) Oral daily  ascorbic acid 500 milliGRAM(s) Oral daily  atenolol  Tablet 50 milliGRAM(s) Oral daily  budesonide 160 MICROgram(s)/formoterol 4.5 MICROgram(s) Inhaler 2 Puff(s) Inhalation two times a day  dextrose 5%. 1000 milliLiter(s) (60 mL/Hr) IV Continuous <Continuous>  flecainide 50 milliGRAM(s) Oral every 12 hours  levoFLOXacin  Tablet 750 milliGRAM(s) Oral every 48 hours  pantoprazole    Tablet 40 milliGRAM(s) Oral two times a day  potassium chloride    Tablet ER 40 milliEquivalent(s) Oral once  zinc sulfate 220 milliGRAM(s) Oral daily    MEDICATIONS  (PRN):  acetaminophen     Tablet .. 650 milliGRAM(s) Oral every 6 hours PRN Temp greater or equal to 38C (100.4F), Mild Pain (1 - 3)  aluminum hydroxide/magnesium hydroxide/simethicone Suspension 30 milliLiter(s) Oral every 4 hours PRN Dyspepsia  guaiFENesin Oral Liquid (Sugar-Free) 100 milliGRAM(s) Oral every 6 hours PRN Cough  melatonin 3 milliGRAM(s) Oral at bedtime PRN Insomnia  ondansetron Injectable 4 milliGRAM(s) IV Push every 8 hours PRN Nausea and/or Vomiting      Allergies    lisinopril (Unknown)  penicillin (Unknown)  Pineapple (Unknown)    Intolerances        PAST MEDICAL & SURGICAL HISTORY:  HTN (hypertension)      Atrial fibrillation      COPD without exacerbation      Gout      Pulmonary hypertension      Acute combined systolic and diastolic congestive heart failure      S/P ORIF (open reduction internal fixation) fracture      History of repair of hip fracture      PHYSICAL EXAM:   Vital Signs:  Vital Signs Last 24 Hrs  T(C): 37.1 (19 Dec 2022 05:39), Max: 37.7 (18 Dec 2022 16:19)  T(F): 98.7 (19 Dec 2022 05:39), Max: 99.8 (18 Dec 2022 16:19)  HR: 81 (19 Dec 2022 09:03) (70 - 83)  BP: 159/63 (19 Dec 2022 05:39) (154/48 - 159/63)  BP(mean): --  RR: 18 (19 Dec 2022 05:39) (16 - 18)  SpO2: 97% (19 Dec 2022 09:03) (95% - 97%)    Parameters below as of 19 Dec 2022 09:03  Patient On (Oxygen Delivery Method): nasal cannula      Daily     Daily Weight in k.2 (19 Dec 2022 09:30)I&O's Summary      GENERAL:  Appears stated age, well-groomed, well-nourished, no distress  HEENT:  NC/AT,  conjunctivae clear and pink, no thyromegaly, nodules, adenopathy, no JVD, sclera -anicteric  CHEST:  Full & symmetric excursion, no increased effort, breath sounds clear  HEART:  Regular rhythm, S1, S2, no murmur/rub/S3/S4, no abdominal bruit, no edema  ABDOMEN:  Soft, non-tender, non-distended, normoactive bowel sounds,  no masses ,no hepato-splenomegaly, no signs of chronic liver disease  EXTEREMITIES:  no cyanosis,clubbing or edema  SKIN:  No rash/erythema/ecchymoses/petechiae/wounds/abscess/warm/dry  NEURO:  Alert, oriented, no asterixis, no tremor, no encephalopathy      LABS:                        8.2    8.08  )-----------( 228      ( 19 Dec 2022 07:00 )             27.4     12-    141  |  107  |  21  ----------------------------<  99  3.4<L>   |  27  |  0.81    Ca    8.3<L>      19 Dec 2022 07:00          amylase   lipase  RADIOLOGY & ADDITIONAL TESTS:

## 2022-12-19 NOTE — PHARMACOTHERAPY INTERVENTION NOTE - COMMENTS
Pt started on levofloxacin for pneumonia (PCN allergy). Recommended obtaining EKG since pt is also on flecainide.
Pt not in pain, has not used Percocet since admission. Recommended to dc.

## 2022-12-20 LAB
ANION GAP SERPL CALC-SCNC: 10 MMOL/L — SIGNIFICANT CHANGE UP (ref 5–17)
BUN SERPL-MCNC: 18 MG/DL — SIGNIFICANT CHANGE UP (ref 7–23)
CALCIUM SERPL-MCNC: 8.6 MG/DL — SIGNIFICANT CHANGE UP (ref 8.4–10.5)
CHLORIDE SERPL-SCNC: 106 MMOL/L — SIGNIFICANT CHANGE UP (ref 96–108)
CO2 SERPL-SCNC: 22 MMOL/L — SIGNIFICANT CHANGE UP (ref 22–31)
CREAT SERPL-MCNC: 0.93 MG/DL — SIGNIFICANT CHANGE UP (ref 0.5–1.3)
EGFR: 60 ML/MIN/1.73M2 — SIGNIFICANT CHANGE UP
GLUCOSE SERPL-MCNC: 107 MG/DL — HIGH (ref 70–99)
HCT VFR BLD CALC: 27.3 % — LOW (ref 34.5–45)
HGB BLD-MCNC: 8.3 G/DL — LOW (ref 11.5–15.5)
MAGNESIUM SERPL-MCNC: 1.6 MG/DL — SIGNIFICANT CHANGE UP (ref 1.6–2.6)
MCHC RBC-ENTMCNC: 27.5 PG — SIGNIFICANT CHANGE UP (ref 27–34)
MCHC RBC-ENTMCNC: 30.4 GM/DL — LOW (ref 32–36)
MCV RBC AUTO: 90.4 FL — SIGNIFICANT CHANGE UP (ref 80–100)
NRBC # BLD: 0 /100 WBCS — SIGNIFICANT CHANGE UP (ref 0–0)
PLATELET # BLD AUTO: 219 K/UL — SIGNIFICANT CHANGE UP (ref 150–400)
POTASSIUM SERPL-MCNC: 4.4 MMOL/L — SIGNIFICANT CHANGE UP (ref 3.5–5.3)
POTASSIUM SERPL-SCNC: 4.4 MMOL/L — SIGNIFICANT CHANGE UP (ref 3.5–5.3)
RBC # BLD: 3.02 M/UL — LOW (ref 3.8–5.2)
RBC # FLD: 15.4 % — HIGH (ref 10.3–14.5)
SODIUM SERPL-SCNC: 138 MMOL/L — SIGNIFICANT CHANGE UP (ref 135–145)
WBC # BLD: 11.36 K/UL — HIGH (ref 3.8–10.5)
WBC # FLD AUTO: 11.36 K/UL — HIGH (ref 3.8–10.5)

## 2022-12-20 PROCEDURE — 99233 SBSQ HOSP IP/OBS HIGH 50: CPT

## 2022-12-20 RX ORDER — CEFEPIME 1 G/1
INJECTION, POWDER, FOR SOLUTION INTRAMUSCULAR; INTRAVENOUS
Refills: 0 | Status: DISCONTINUED | OUTPATIENT
Start: 2022-12-20 | End: 2022-12-23

## 2022-12-20 RX ORDER — CEFEPIME 1 G/1
2000 INJECTION, POWDER, FOR SOLUTION INTRAMUSCULAR; INTRAVENOUS EVERY 8 HOURS
Refills: 0 | Status: DISCONTINUED | OUTPATIENT
Start: 2022-12-20 | End: 2022-12-23

## 2022-12-20 RX ORDER — CEFEPIME 1 G/1
2000 INJECTION, POWDER, FOR SOLUTION INTRAMUSCULAR; INTRAVENOUS ONCE
Refills: 0 | Status: COMPLETED | OUTPATIENT
Start: 2022-12-20 | End: 2022-12-20

## 2022-12-20 RX ADMIN — BUDESONIDE AND FORMOTEROL FUMARATE DIHYDRATE 2 PUFF(S): 160; 4.5 AEROSOL RESPIRATORY (INHALATION) at 21:33

## 2022-12-20 RX ADMIN — BUDESONIDE AND FORMOTEROL FUMARATE DIHYDRATE 2 PUFF(S): 160; 4.5 AEROSOL RESPIRATORY (INHALATION) at 10:16

## 2022-12-20 RX ADMIN — CEFEPIME 100 MILLIGRAM(S): 1 INJECTION, POWDER, FOR SOLUTION INTRAMUSCULAR; INTRAVENOUS at 21:18

## 2022-12-20 RX ADMIN — ATENOLOL 50 MILLIGRAM(S): 25 TABLET ORAL at 06:20

## 2022-12-20 RX ADMIN — Medication 500 MILLIGRAM(S): at 11:21

## 2022-12-20 RX ADMIN — Medication 650 MILLIGRAM(S): at 17:49

## 2022-12-20 RX ADMIN — Medication 50 MILLIGRAM(S): at 17:49

## 2022-12-20 RX ADMIN — Medication 100 MILLIGRAM(S): at 11:21

## 2022-12-20 RX ADMIN — PANTOPRAZOLE SODIUM 40 MILLIGRAM(S): 20 TABLET, DELAYED RELEASE ORAL at 17:49

## 2022-12-20 RX ADMIN — CEFEPIME 100 MILLIGRAM(S): 1 INJECTION, POWDER, FOR SOLUTION INTRAMUSCULAR; INTRAVENOUS at 18:57

## 2022-12-20 RX ADMIN — ZINC SULFATE TAB 220 MG (50 MG ZINC EQUIVALENT) 220 MILLIGRAM(S): 220 (50 ZN) TAB at 11:21

## 2022-12-20 RX ADMIN — Medication 50 MILLIGRAM(S): at 06:20

## 2022-12-20 RX ADMIN — Medication 650 MILLIGRAM(S): at 18:10

## 2022-12-20 RX ADMIN — PANTOPRAZOLE SODIUM 40 MILLIGRAM(S): 20 TABLET, DELAYED RELEASE ORAL at 06:20

## 2022-12-20 RX ADMIN — Medication 100 MILLIGRAM(S): at 06:21

## 2022-12-20 NOTE — PROGRESS NOTE ADULT - SUBJECTIVE AND OBJECTIVE BOX
Patient is a 87y old  Female who presents with a chief complaint of Anemia     Patient seen and examined at bedside. No overnight reported events. Pt sleepy denying complaints.     ALLERGIES:  lisinopril (Unknown)  penicillin (Unknown)  Pineapple (Unknown)    MEDICATIONS  (STANDING):  allopurinol 100 milliGRAM(s) Oral daily  ascorbic acid 500 milliGRAM(s) Oral daily  atenolol  Tablet 50 milliGRAM(s) Oral daily  budesonide 160 MICROgram(s)/formoterol 4.5 MICROgram(s) Inhaler 2 Puff(s) Inhalation two times a day  dextrose 5%. 1000 milliLiter(s) (60 mL/Hr) IV Continuous <Continuous>  flecainide 50 milliGRAM(s) Oral every 12 hours  pantoprazole    Tablet 40 milliGRAM(s) Oral two times a day  zinc sulfate 220 milliGRAM(s) Oral daily    MEDICATIONS  (PRN):  acetaminophen     Tablet .. 650 milliGRAM(s) Oral every 6 hours PRN Temp greater or equal to 38C (100.4F), Mild Pain (1 - 3)  aluminum hydroxide/magnesium hydroxide/simethicone Suspension 30 milliLiter(s) Oral every 4 hours PRN Dyspepsia  guaiFENesin Oral Liquid (Sugar-Free) 100 milliGRAM(s) Oral every 6 hours PRN Cough  melatonin 3 milliGRAM(s) Oral at bedtime PRN Insomnia  ondansetron Injectable 4 milliGRAM(s) IV Push every 8 hours PRN Nausea and/or Vomiting    Vital Signs Last 24 Hrs  T(F): 98.5 (20 Dec 2022 05:14), Max: 99.6 (19 Dec 2022 20:25)  HR: 72 (20 Dec 2022 10:16) (72 - 81)  BP: 160/42 (20 Dec 2022 05:14) (145/52 - 160/42)  RR: 18 (20 Dec 2022 10:12) (17 - 18)  SpO2: 95% (20 Dec 2022 10:16) (95% - 98%)  I&O's Summary    PHYSICAL EXAM:  General: NAD, A/O x 3  ENT: MMM, no oral thrush   Neck: Supple, No JVD  Lungs: Clear to auscultation bilaterally, non labored breathing  Cardio: RRR, S1/S2, No murmurs  Abdomen: Soft, Nontender, Nondistended; Bowel sounds present  Extremities: No calf tenderness, No pitting edema    LABS:                        8.3    11.36 )-----------( 219      ( 20 Dec 2022 06:53 )             27.3     12-20    138  |  106  |  18  ----------------------------<  107  4.4   |  22  |  0.93    Ca    8.6      20 Dec 2022 06:53  Mg     1.6     12-20                                      Culture - Blood (collected 17 Dec 2022 09:36)  Source: .Blood Blood-Peripheral  Preliminary Report (18 Dec 2022 15:01):    No growth to date.    Culture - Blood (collected 17 Dec 2022 09:36)  Source: .Blood Blood-Peripheral  Preliminary Report (18 Dec 2022 15:01):    No growth to date.      COVID-19 PCR: [place] (11-28-22 @ 11:43)      RADIOLOGY & ADDITIONAL TESTS:    Care Discussed with Consultants/Other Providers:

## 2022-12-20 NOTE — PROGRESS NOTE ADULT - ASSESSMENT
anemia / GI bleeding H & H stable S/P 1 UPRBC . No plan for EGD colonoscopy at this time as family request   Acute flu

## 2022-12-20 NOTE — PROGRESS NOTE ADULT - ASSESSMENT
88 yo female with PMH A Fib on eliquis, AV stenosis s/p TAVR, HTN, asthma, HFpEF, pulmonary HTN, dementia, recently admitted Nov 11/28-11/30/2022 for non healing right ankle wound w/ concern for sepsis, sent by PCP due to +fecal occult and [facility] 7.4 at [facility].     #Normocytic anemia, possible due to GI bleed  #Hepatic lesion and pulmonary nodules seen on CT  - Fecal occult was negative on 11/28, though pt reported to be guaiac positive prior to admission  - H/H stable, pt is s/p 1 UNIT OF PRBC TRANSFUSION  - cont PPI BID  - GI recs noted; trend LFT's and H/H - for possible Colonoscopy/EGD??  - per discussion with daughter she does not want to have a colonoscopy or EGD-- she is concerned about possible risks of having a procedure with underlying co-morbidities as well as would likely not act on any findings    #Fevers resolved, probable PNA  -elevated procalc  -CT of chest with b/l lower lobe opacities (12/16)  -s/p Levaquin  -cont O2, currently on 2L nc, O2 sats 97%---check RA sats today and wean as tolerated  -blood cultures x 2 sent 12/17 so far NGTD  -Legionella neg  -wbc not elevated, lactate neg  -pt had an Echocardiogram on 11/29:  no effusions, EF 50>55%, mild to mod mitral valve regur    # RSV  # COPD without exacerbation   - cont cough suppressant prn  - On Breo-Ellipta, symbicort while admitted and albuterol  - continuous pulse ox    #Pre-renal azotemia  #CKD3  #Hypernatremia, dehydration  #Hypokalemia  -K+ improved  - cont D5 IVF   - Holding diuretics for now     # RLE edema  - NO DVT  - was on AC as an OP.    #Multiple skin wounds - present on admission  #Nonhealing right medial malleolar wound, unstageable   - skin tear right forearm, right heel unstageable pressure wound, unstageable buttock wound and on right great toe.  - skin wounds do not appear infected  - previous wound care orders from facility  - nutritional support w/ vitamins     #Chronic A-Fib on Eliquis  #Chronic diastolic heart failure (E 50-55% 11/29/22)  #HTN  #Bioprosthetic AV  -plan to restart Eliquis pending GI clearance  - Continue home meds - flecainide and atenolol      # Abnormal EKG   - possible limb lead reversal.   -monitor     #Gout  - Allopurinol    #Advanced Dementia  - Supportive care  - Frequent reorientation    GOC:  DNR/DNI    Dispo:  Updated HCP/daughter Sandi Metzger (home phone preferred [phone]). Alternate cell # is [phone].    DVT  ppx:  SCDs.    Dispo: restart AC then monitor patient for 24 hrs

## 2022-12-20 NOTE — PROGRESS NOTE ADULT - SUBJECTIVE AND OBJECTIVE BOX
INTERVAL HPI/OVERNIGHT EVENTS:  HPI:  86 yo female with PMH A Fib was on eliquis, HTN, COPD, HFpEF, advanced dementia.   Patient seen and examined at bed side. Care giver at bed side no event over night. No BM today.   Spoke with her daughter on the phone she do not want her mother to do .     MEDICATIONS  (STANDING):  allopurinol 100 milliGRAM(s) Oral daily  ascorbic acid 500 milliGRAM(s) Oral daily  atenolol  Tablet 50 milliGRAM(s) Oral daily  budesonide 160 MICROgram(s)/formoterol 4.5 MICROgram(s) Inhaler 2 Puff(s) Inhalation two times a day  dextrose 5%. 1000 milliLiter(s) (60 mL/Hr) IV Continuous <Continuous>  flecainide 50 milliGRAM(s) Oral every 12 hours  levoFLOXacin  Tablet 750 milliGRAM(s) Oral every 48 hours  pantoprazole    Tablet 40 milliGRAM(s) Oral two times a day  potassium chloride    Tablet ER 40 milliEquivalent(s) Oral once  zinc sulfate 220 milliGRAM(s) Oral daily    MEDICATIONS  (PRN):  acetaminophen     Tablet .. 650 milliGRAM(s) Oral every 6 hours PRN Temp greater or equal to 38C (100.4F), Mild Pain (1 - 3)  aluminum hydroxide/magnesium hydroxide/simethicone Suspension 30 milliLiter(s) Oral every 4 hours PRN Dyspepsia  guaiFENesin Oral Liquid (Sugar-Free) 100 milliGRAM(s) Oral every 6 hours PRN Cough  melatonin 3 milliGRAM(s) Oral at bedtime PRN Insomnia  ondansetron Injectable 4 milliGRAM(s) IV Push every 8 hours PRN Nausea and/or Vomiting      Allergies    lisinopril (Unknown)  penicillin (Unknown)  Pineapple (Unknown)    Intolerances        PAST MEDICAL & SURGICAL HISTORY:  HTN (hypertension)      Atrial fibrillation      COPD without exacerbation      Gout      Pulmonary hypertension      Acute combined systolic and diastolic congestive heart failure      S/P ORIF (open reduction internal fixation) fracture      History of repair of hip fracture    Vital Signs Last 24 Hrs  T(C): 36.9 (22 @ 05:14), Max: 37.6 (22 @ 20:25)  T(F): 98.5 (22 @ 05:14), Max: 99.6 (22 @ 20:25)  HR: 72 (22 @ 10:16) (72 - 81)  BP: 160/42 (22 @ 05:14) (145/52 - 160/42)  BP(mean): --  RR: 18 (22 @ 10:12) (17 - 18)  SpO2: 95% (22 @ 10:16) (95% - 98%)        Parameters below as of 19 Dec 2022 09:03  Patient On (Oxygen Delivery Method): nasal cannula      Daily     Daily Weight in k.2 (19 Dec 2022 09:30)I&O's Summary      GENERAL:  Appears stated age,  no distress  HEENT:  NC/AT,  conjunctivae clear and pink   CHEST:  Full & symmetric excursion, no increased effort, breath sounds clear  HEART:  Regular rhythm, S1, S2,  no edema  ABDOMEN:  Soft, non-tender, non-distended, normoactive bowel sounds.   EXTREMITIES  no edema  SKIN:  warm/dry  NEURO:  Alert, confused     LABS:                          8.3    11.36 )-----------( 219      ( 20 Dec 2022 06:53 )             27.3   12-20    138  |  106  |  18  ----------------------------<  107<H>  4.4   |  22  |  0.93    Ca    8.6      20 Dec 2022 06:53  Mg     1.6     12-20          amylase   lipase  RADIOLOGY & ADDITIONAL TESTS:   INTERVAL HPI/OVERNIGHT EVENTS:  HPI:  86 yo female with PMH A Fib was on eliquis, HTN, COPD, HFpEF, advanced dementia.   Patient seen and examined at bed side. Care giver at bed side no event over night. No BM today.   Spoke with her daughter on the phone discussed the pros & cons of EGD/ Colonoscopy procedure. She prefer not to put her mother through risk of any procedure with her underlining comorbidities.     MEDICATIONS  (STANDING):  allopurinol 100 milliGRAM(s) Oral daily  ascorbic acid 500 milliGRAM(s) Oral daily  atenolol  Tablet 50 milliGRAM(s) Oral daily  budesonide 160 MICROgram(s)/formoterol 4.5 MICROgram(s) Inhaler 2 Puff(s) Inhalation two times a day  dextrose 5%. 1000 milliLiter(s) (60 mL/Hr) IV Continuous <Continuous>  flecainide 50 milliGRAM(s) Oral every 12 hours  levoFLOXacin  Tablet 750 milliGRAM(s) Oral every 48 hours  pantoprazole    Tablet 40 milliGRAM(s) Oral two times a day  potassium chloride    Tablet ER 40 milliEquivalent(s) Oral once  zinc sulfate 220 milliGRAM(s) Oral daily    MEDICATIONS  (PRN):  acetaminophen     Tablet .. 650 milliGRAM(s) Oral every 6 hours PRN Temp greater or equal to 38C (100.4F), Mild Pain (1 - 3)  aluminum hydroxide/magnesium hydroxide/simethicone Suspension 30 milliLiter(s) Oral every 4 hours PRN Dyspepsia  guaiFENesin Oral Liquid (Sugar-Free) 100 milliGRAM(s) Oral every 6 hours PRN Cough  melatonin 3 milliGRAM(s) Oral at bedtime PRN Insomnia  ondansetron Injectable 4 milliGRAM(s) IV Push every 8 hours PRN Nausea and/or Vomiting      Allergies    lisinopril (Unknown)  penicillin (Unknown)  Pineapple (Unknown)    Intolerances        PAST MEDICAL & SURGICAL HISTORY:  HTN (hypertension)      Atrial fibrillation      COPD without exacerbation      Gout      Pulmonary hypertension      Acute combined systolic and diastolic congestive heart failure      S/P ORIF (open reduction internal fixation) fracture      History of repair of hip fracture    Vital Signs Last 24 Hrs  T(C): 36.9 (22 @ 05:14), Max: 37.6 (22 @ 20:25)  T(F): 98.5 (22 @ 05:14), Max: 99.6 (22 @ 20:25)  HR: 72 (22 @ 10:16) (72 - 81)  BP: 160/42 (22 @ 05:14) (145/52 - 160/42)  BP(mean): --  RR: 18 (22 @ 10:12) (17 - 18)  SpO2: 95% (22 @ 10:16) (95% - 98%)        Parameters below as of 19 Dec 2022 09:03  Patient On (Oxygen Delivery Method): nasal cannula      Daily     Daily Weight in k.2 (19 Dec 2022 09:30)I&O's Summary      GENERAL:  Appears stated age,  no distress  HEENT:  NC/AT,  conjunctivae clear and pink   CHEST:  Full & symmetric excursion, no increased effort, breath sounds clear  HEART:  Regular rhythm, S1, S2,  no edema  ABDOMEN:  Soft, non-tender, non-distended, normoactive bowel sounds.   EXTREMITIES  no edema  SKIN:  warm/dry  NEURO:  Alert, confused     LABS:                          8.3    11.36 )-----------( 219      ( 20 Dec 2022 06:53 )             27.3   12-20    138  |  106  |  18  ----------------------------<  107<H>  4.4   |  22  |  0.93    Ca    8.6      20 Dec 2022 06:53  Mg     1.6     12-20          amylase   lipase  RADIOLOGY & ADDITIONAL TESTS:

## 2022-12-21 LAB
ANION GAP SERPL CALC-SCNC: 8 MMOL/L — SIGNIFICANT CHANGE UP (ref 5–17)
BUN SERPL-MCNC: 24 MG/DL — HIGH (ref 7–23)
CALCIUM SERPL-MCNC: 8.8 MG/DL — SIGNIFICANT CHANGE UP (ref 8.4–10.5)
CHLORIDE SERPL-SCNC: 107 MMOL/L — SIGNIFICANT CHANGE UP (ref 96–108)
CO2 SERPL-SCNC: 24 MMOL/L — SIGNIFICANT CHANGE UP (ref 22–31)
CREAT SERPL-MCNC: 0.81 MG/DL — SIGNIFICANT CHANGE UP (ref 0.5–1.3)
EGFR: 70 ML/MIN/1.73M2 — SIGNIFICANT CHANGE UP
GLUCOSE SERPL-MCNC: 110 MG/DL — HIGH (ref 70–99)
HCT VFR BLD CALC: 29.8 % — LOW (ref 34.5–45)
HGB BLD-MCNC: 8.9 G/DL — LOW (ref 11.5–15.5)
MCHC RBC-ENTMCNC: 27.6 PG — SIGNIFICANT CHANGE UP (ref 27–34)
MCHC RBC-ENTMCNC: 29.9 GM/DL — LOW (ref 32–36)
MCV RBC AUTO: 92.5 FL — SIGNIFICANT CHANGE UP (ref 80–100)
NRBC # BLD: 0 /100 WBCS — SIGNIFICANT CHANGE UP (ref 0–0)
PLATELET # BLD AUTO: 195 K/UL — SIGNIFICANT CHANGE UP (ref 150–400)
POTASSIUM SERPL-MCNC: 4.3 MMOL/L — SIGNIFICANT CHANGE UP (ref 3.5–5.3)
POTASSIUM SERPL-SCNC: 4.3 MMOL/L — SIGNIFICANT CHANGE UP (ref 3.5–5.3)
RBC # BLD: 3.22 M/UL — LOW (ref 3.8–5.2)
RBC # FLD: 15.4 % — HIGH (ref 10.3–14.5)
SARS-COV-2 RNA SPEC QL NAA+PROBE: SIGNIFICANT CHANGE UP
SODIUM SERPL-SCNC: 139 MMOL/L — SIGNIFICANT CHANGE UP (ref 135–145)
WBC # BLD: 11.37 K/UL — HIGH (ref 3.8–10.5)
WBC # FLD AUTO: 11.37 K/UL — HIGH (ref 3.8–10.5)

## 2022-12-21 PROCEDURE — 99233 SBSQ HOSP IP/OBS HIGH 50: CPT

## 2022-12-21 RX ORDER — APIXABAN 2.5 MG/1
2.5 TABLET, FILM COATED ORAL
Refills: 0 | Status: DISCONTINUED | OUTPATIENT
Start: 2022-12-21 | End: 2022-12-23

## 2022-12-21 RX ADMIN — CEFEPIME 100 MILLIGRAM(S): 1 INJECTION, POWDER, FOR SOLUTION INTRAMUSCULAR; INTRAVENOUS at 21:03

## 2022-12-21 RX ADMIN — Medication 500 MILLIGRAM(S): at 11:49

## 2022-12-21 RX ADMIN — PANTOPRAZOLE SODIUM 40 MILLIGRAM(S): 20 TABLET, DELAYED RELEASE ORAL at 05:13

## 2022-12-21 RX ADMIN — CEFEPIME 100 MILLIGRAM(S): 1 INJECTION, POWDER, FOR SOLUTION INTRAMUSCULAR; INTRAVENOUS at 05:12

## 2022-12-21 RX ADMIN — Medication 100 MILLIGRAM(S): at 11:49

## 2022-12-21 RX ADMIN — BUDESONIDE AND FORMOTEROL FUMARATE DIHYDRATE 2 PUFF(S): 160; 4.5 AEROSOL RESPIRATORY (INHALATION) at 22:33

## 2022-12-21 RX ADMIN — APIXABAN 2.5 MILLIGRAM(S): 2.5 TABLET, FILM COATED ORAL at 17:56

## 2022-12-21 RX ADMIN — CEFEPIME 100 MILLIGRAM(S): 1 INJECTION, POWDER, FOR SOLUTION INTRAMUSCULAR; INTRAVENOUS at 13:49

## 2022-12-21 RX ADMIN — Medication 50 MILLIGRAM(S): at 05:13

## 2022-12-21 RX ADMIN — ZINC SULFATE TAB 220 MG (50 MG ZINC EQUIVALENT) 220 MILLIGRAM(S): 220 (50 ZN) TAB at 11:49

## 2022-12-21 RX ADMIN — Medication 50 MILLIGRAM(S): at 17:56

## 2022-12-21 RX ADMIN — PANTOPRAZOLE SODIUM 40 MILLIGRAM(S): 20 TABLET, DELAYED RELEASE ORAL at 17:56

## 2022-12-21 RX ADMIN — BUDESONIDE AND FORMOTEROL FUMARATE DIHYDRATE 2 PUFF(S): 160; 4.5 AEROSOL RESPIRATORY (INHALATION) at 10:29

## 2022-12-21 RX ADMIN — ATENOLOL 50 MILLIGRAM(S): 25 TABLET ORAL at 05:13

## 2022-12-21 NOTE — PROGRESS NOTE ADULT - SUBJECTIVE AND OBJECTIVE BOX
INTERVAL HPI/OVERNIGHT EVENTS:  HPI:  86 yo female with PMH A Fib was on eliquis, HTN, COPD, HFpEF, advanced dementia.   Patient seen and examined at bed side. Care giver at bed side no event over night. No BM today.   I had phone discussion with her daughter yesterday the pros & cons of EGD/ Colonoscopy procedure. She prefer not to put her mother through risk of any procedure with her underlining comorbidities.     MEDICATIONS  (STANDING):  allopurinol 100 milliGRAM(s) Oral daily  ascorbic acid 500 milliGRAM(s) Oral daily  atenolol  Tablet 50 milliGRAM(s) Oral daily  budesonide 160 MICROgram(s)/formoterol 4.5 MICROgram(s) Inhaler 2 Puff(s) Inhalation two times a day  dextrose 5%. 1000 milliLiter(s) (60 mL/Hr) IV Continuous <Continuous>  flecainide 50 milliGRAM(s) Oral every 12 hours  levoFLOXacin  Tablet 750 milliGRAM(s) Oral every 48 hours  pantoprazole    Tablet 40 milliGRAM(s) Oral two times a day  potassium chloride    Tablet ER 40 milliEquivalent(s) Oral once  zinc sulfate 220 milliGRAM(s) Oral daily    MEDICATIONS  (PRN):  acetaminophen     Tablet .. 650 milliGRAM(s) Oral every 6 hours PRN Temp greater or equal to 38C (100.4F), Mild Pain (1 - 3)  aluminum hydroxide/magnesium hydroxide/simethicone Suspension 30 milliLiter(s) Oral every 4 hours PRN Dyspepsia  guaiFENesin Oral Liquid (Sugar-Free) 100 milliGRAM(s) Oral every 6 hours PRN Cough  melatonin 3 milliGRAM(s) Oral at bedtime PRN Insomnia  ondansetron Injectable 4 milliGRAM(s) IV Push every 8 hours PRN Nausea and/or Vomiting    Vital Signs Last 24 Hrs  T(C): 36.7 (21 Dec 2022 05:07), Max: 37.8 (20 Dec 2022 18:49)  T(F): 98 (21 Dec 2022 05:07), Max: 100 (20 Dec 2022 18:49)  HR: 64 (21 Dec 2022 10:30) (64 - 73)  BP: 140/40 (21 Dec 2022 05:07) (117/61 - 155/50)  BP(mean): --  RR: 18 (21 Dec 2022 05:07) (18 - 19)  SpO2: 96% (21 Dec 2022 10:30) (94% - 96%)    Parameters below as of 21 Dec 2022 10:30  Patient On (Oxygen Delivery Method): nasal cannula        GENERAL:  Appears stated age,  no distress  HEENT:  NC/AT,  conjunctivae clear and pink   CHEST:  Full & symmetric excursion, no increased effort, breath sounds clear  HEART:  Regular rhythm, S1, S2,  no edema  ABDOMEN:  Soft, non-tender, non-distended, normoactive bowel sounds.   EXTREMITIES  no edema  SKIN:  warm/dry  NEURO:  Alert, confused                             8.9    11.37 )-----------( 195      ( 21 Dec 2022 08:48 )             29.8   12-21    139  |  107  |  24<H>  ----------------------------<  110<H>  4.3   |  24  |  0.81    Ca    8.8      21 Dec 2022 08:48  Mg     1.6     12-20

## 2022-12-21 NOTE — PROGRESS NOTE ADULT - NS ATTEND AMEND GEN_ALL_CORE FT
86 y/o F with PMH Afib on eliquis, AV stenosis s/p TAVR, HTN, asthma, HFpEF, pHTN, dementia, recently admitted Nov 11/28-11/30/2022 for non healing right ankle wound w/ concern for sepsis, presents at the request of PCP due to +fecal occult and hemoglobin of 7.4 at [facility]. CT abd/pelvis w/ non contrast pending. GI consult appreciated. F/u CT C/A/P, monitor H/H.
88 y/o F with PMH Afib on eliquis, AV stenosis s/p TAVR, HTN, asthma, HFpEF, pHTN, dementia, recently admitted Nov 11/28-11/30/2022 for non healing right ankle wound w/ concern for sepsis, presents at the request of PCP due to +fecal occult and hemoglobin of 7.4 at [facility]. s/p 1un prbc with expected improvement. GI appreciated. cont levaquin.
88 y/o F with PMH Afib on eliquis, AV stenosis s/p TAVR, HTN, asthma, HFpEF, pHTN, dementia, recently admitted Nov 11/28-11/30/2022 for non healing right ankle wound w/ concern for sepsis, presents at the request of PCP due to +fecal occult and hemoglobin of 7.4 at [facility]. s/p 1un prbc with expected improvement. GI appreciated - risks vs benefits of egd/colonoscopy reviewed, dtr prefers conservative management. s/p levaquin x2 doses, dc'd due to qt prolongation. wean off O2 as able. dispo planning
86 y/o F with PMH Afib on eliquis, AV stenosis s/p TAVR, HTN, asthma, HFpEF, pHTN, dementia, recently admitted Nov 11/28-11/30/2022 for non healing right ankle wound w/ concern for sepsis, presents at the request of PCP due to +fecal occult and hemoglobin of 7.4 at [facility]. s/p 1un prbc with expected improvement. GI appreciated. Tmax 102, procal 1.14, start levaquin. follow blood cx
agree
88 y/o F with PMH Afib on eliquis, AV stenosis s/p TAVR, HTN, asthma, HFpEF, pHTN, dementia, recently admitted Nov 11/28-11/30/2022 for non healing right ankle wound w/ concern for sepsis, presents at the request of PCP due to +fecal occult and hemoglobin of 7.4 at [facility]. s/p 1un prbc with expected improvement. GI appreciated - risks vs benefits of egd/colonoscopy reviewed, dtr prefers conservative management. s/p levaquin x2 doses, dc'd due to qt prolongation. cefepime resumed for pna. wean off O2 as able. eliquis resumed, monitor for s/s bleeding.
88 y/o F with PMH Afib on eliquis, AV stenosis s/p TAVR, HTN, asthma, HFpEF, pHTN, dementia, recently admitted Nov 11/28-11/30/2022 for non healing right ankle wound w/ concern for sepsis, presents at the request of PCP due to +fecal occult and hemoglobin of 7.4 at [facility]. s/p 1un prbc with expected improvement. GI appreciated. cont levaquin. check ekg. wean off O2 as able.
agree with above
agree with plan

## 2022-12-21 NOTE — PROGRESS NOTE ADULT - PROBLEM SELECTOR PLAN 1
No active bleeding  given active flu will continue to monitor    EGD and Colonoscopy pending course and respiratory improvement.   If daughter agree for procedure, She will give the consent.
Continue to Monitor H & H   Monitor stool   Keep active T & C  Transfuse Hb < 7 or symptomatic   Will discuss for possible colonoscopy EGD when more stable  She will need Clearance for procedure.
No active bleeding  given active flu will continue to monitor   Her daughter do not want EGD and Colonoscopy due to her mother's underline condition.
No active bleeding  given active flu will continue to monitor   Her daughter do not want EGD and Colonoscopy due to her mother's underline condition.   Will defer to cardiologist for Anticoagulation
No active bleeding  given active flu will continue to monitor and discuss ? EGD and Colonoscopy pending course and respiratory improvement

## 2022-12-21 NOTE — PROGRESS NOTE ADULT - ASSESSMENT
88 yo female with PMH A Fib on eliquis, AV stenosis s/p TAVR, HTN, asthma, HFpEF, pulmonary HTN, dementia, recently admitted Nov 11/28-11/30/2022 for non healing right ankle wound w/ concern for sepsis, sent by PCP due to +fecal occult and hemoglobin of 7.4 at [facility].     #Normocytic anemia, possible due to GI bleed  #Hepatic lesion and pulmonary nodules seen on CT  - Fecal occult was negative on 11/28, though pt reported to be guaiac positive prior to admission  - H&H stable, pt is s/p 1 unit PRBCs on 12/16  - Cont PPI BID  - GI recs noted, no plan for Colonoscopy/EGD - daughter concerned about possible risks of having a procedure with underlying co-morbidities as well as would likely not act on any findings  - Resume eliquis today if H&H stable     #Fevers resolved, probable PNA  - CT of chest with bilateral lower lobe opacities (12/16)  - Elevated procalc  - Blood cultures from 12/17 with NGTD  - Legionella neg  - Now on room air  - s/p levaquin x2 doses, dc'd due to qt prolongation  - Continue cefepime    #RSV  #COPD without exacerbation   - Tolerating room air, afebrile   - Cont cough suppressant prn  - On Breo-Ellipta, symbicort while admitted and albuterol  - Continuous pulse ox    #Pre-renal azotemia  #CKD3  #Hypernatremia, dehydration - resolved  #Hypokalemia - resolved  - DC IVF  - Resume? Holding diuretics for now   - f/u AM labs     #RLE edema  - NO DVT on ultrasound  - Was on AC as an outpatient    #Multiple skin wounds - present on admission  #Nonhealing right medial malleolar wound, unstageable   - Skin tear right forearm, right heel unstageable pressure wound, unstageable buttock wound and on right great toe.  - Skin wounds do not appear infected  - Previous wound care orders from facility  - Nutritional support w/ vitamins     #Chronic A-Fib on Eliquis  #Chronic diastolic heart failure (E 50-55% 11/29/22)  #HTN  #Bioprosthetic AV  - Echocardiogram 11/29: No effusions, EF 50>55%, mild to mod mitral valve regur  - Plan to restart Eliquis today   - Appreciate GI recs   - Continue home meds - flecainide and atenolol    #Abnormal EKG   - Possible limb lead reversal  - Monitor     #Gout  - Allopurinol    #Advanced Dementia  - Supportive care  - Frequent reorientation    #DVT ppx  - Had been holding eliquis for GI bleed  - SCDs  - f/u AM labs, resume eliquis today if H&H stable     GOC:  DNR/DNI    Dispo: Restart AC then monitor patient for 24 hrs. Anticipate DC back to assisted living tomorrow     HCP/daughter Sandi Metzger (home phone preferred [phone]). Alternate cell # is [phone].             86 yo female with PMH A Fib on eliquis, AV stenosis s/p TAVR, HTN, asthma, HFpEF, pulmonary HTN, dementia, recently admitted Nov 11/28-11/30/2022 for non healing right ankle wound w/ concern for sepsis, sent by PCP due to +fecal occult and [facility]. Also found to have RSV    #Normocytic anemia, possible due to GI bleed  #Hepatic lesion and pulmonary nodules seen on CT  - Fecal occult was negative on 11/28, though pt reported to be guaiac positive prior to admission  - H&H stable, pt is s/p 1 unit PRBCs on 12/16  - Cont PPI BID  - GI recs noted, no plan for Colonoscopy/EGD - daughter concerned about possible risks of having a procedure with underlying co-morbidities as well as would likely not act on any findings  - Plan to resume eliquis today as H&H stable, monitor for bleeding     #Fevers resolved, probable PNA  - CT of chest with bilateral lower lobe opacities (12/16)  - Elevated procalc  - Blood cultures from 12/17 with NGTD  - Legionella neg  - Now on room air  - s/p levaquin x2 doses, dc'd due to qt prolongation  - Continue cefepime x24 hours     #RSV  #COPD without exacerbation   - Tolerating room air, afebrile   - Cont cough suppressant prn  - On Breo-Ellipta, symbicort while admitted and albuterol  - Continuous pulse ox    #Pre-renal azotemia  #CKD3  #Hypernatremia, dehydration - resolved  #Hypokalemia - resolved  - DC IVF  - f/u AM labs     #RLE edema  - NO DVT on ultrasound  - Was on AC as an outpatient    #Multiple skin wounds - present on admission  #Nonhealing right medial malleolar wound, unstageable   - Skin tear right forearm, right heel unstageable pressure wound, unstageable buttock wound and on right great toe.  - Skin wounds do not appear infected  - Previous wound care orders from facility  - Nutritional support w/ vitamins     #Chronic A-Fib on Eliquis  #Chronic diastolic heart failure (E 50-55% 11/29/22)  #HTN  #Bioprosthetic AV  - Echocardiogram 11/29: No effusions, EF 50>55%, mild to mod mitral valve regur  - Plan to restart Eliquis today   - Appreciate GI recs   - Continue home meds - flecainide and atenolol    #Abnormal EKG   - Possible limb lead reversal  - Monitor     #Gout  - Allopurinol    #Advanced Dementia  - Supportive care  - Frequent reorientation    #DVT ppx  - Had been holding eliquis for GI bleed - H&H stable, will resume eliquis   - SCDs    GOC:  DNR/DNI    Dispo: Restart AC then monitor patient for 24 hrs. Anticipate DC back to assisted living tomorrow     HCP/daughter Sandi Metzger (home phone preferred [phone]). Alternate cell # is [phone].             88 yo female with PMH A Fib on eliquis, AV stenosis s/p TAVR, HTN, asthma, HFpEF, pulmonary HTN, dementia, recently admitted Nov 11/28-11/30/2022 for non healing right ankle wound w/ concern for sepsis, sent by PCP due to +fecal occult and hemoglobin of 7.4 at shelter. Also found to have RSV    #Normocytic anemia, possible due to GI bleed  #Hepatic lesion and pulmonary nodules seen on CT  - Fecal occult was negative on 11/28, though pt reported to be guaiac positive prior to admission  - H&H stable, pt is s/p 1 unit PRBCs on 12/16  - Cont PPI BID  - GI recs noted, no plan for Colonoscopy/EGD - daughter concerned about possible risks of having a procedure with underlying co-morbidities as well as would likely not act on any findings  - Plan to resume eliquis today as H&H stable, monitor for bleeding     #Fevers resolved, probable PNA  - CT of chest with bilateral lower lobe opacities (12/16)  - Elevated procalc  - Blood cultures from 12/17 with NGTD  - Legionella neg  - Now on room air  - s/p levaquin x2 doses, dc'd due to qt prolongation  - Continue cefepime x24 hours     #RSV  #COPD without exacerbation   - Tolerating room air, afebrile   - Cont cough suppressant prn  - On Breo-Ellipta, symbicort while admitted and albuterol  - Continuous pulse ox    #Pre-renal azotemia  #CKD3  #Hypernatremia, dehydration - resolved  #Hypokalemia - resolved  - DC IVF  - f/u AM labs     #RLE edema  - NO DVT on ultrasound  - Was on AC as an outpatient    #Multiple skin wounds - present on admission  #Nonhealing right medial malleolar wound, unstageable   - Skin tear right forearm, right heel unstageable pressure wound, unstageable buttock wound and on right great toe.  - Skin wounds do not appear infected  - Previous wound care orders from facility  - Nutritional support w/ vitamins     #Chronic A-Fib on Eliquis  #Chronic diastolic heart failure (E 50-55% 11/29/22)  #HTN  #Bioprosthetic AV  - Echocardiogram 11/29: No effusions, EF 50>55%, mild to mod mitral valve regur  - Plan to restart Eliquis today   - Appreciate GI recs   - Continue home meds - flecainide and atenolol    #Abnormal EKG   - Possible limb lead reversal  - Monitor     #Gout  - Allopurinol    #Advanced Dementia  - Supportive care  - Frequent reorientation    #DVT ppx  - Had been holding eliquis for GI bleed - H&H stable, will resume eliquis   - SCDs    GOC:  DNR/DNI    Dispo: Restart AC then monitor patient for 24 hrs. Anticipate DC back to assisted living tomorrow     12/21: Called HCP/daughter Sandi Metzger at home to provide update - spoke to her  Russell, she is currently working. Called Sandi on her cell, left  with callback #    Sandi: Home # (preferred) [phone]. Cell # [phone].             88 yo female with PMH A Fib on eliquis, AV stenosis s/p TAVR, HTN, asthma, HFpEF, pulmonary HTN, dementia, recently admitted Nov 11/28-11/30/2022 for non healing right ankle wound w/ concern for sepsis, sent by PCP due to +fecal occult and hemoglobin of 7.4 at [facility]. Also found to have RSV    #Normocytic anemia, possible due to GI bleed  #Hepatic lesion and pulmonary nodules seen on CT  - Fecal occult was negative on 11/28, though pt reported to be guaiac positive prior to admission  - H&H stable, pt is s/p 1 unit PRBCs on 12/16  - Cont PPI BID  - GI recs noted, no plan for Colonoscopy/EGD - daughter concerned about possible risks of having a procedure with underlying co-morbidities as well as would likely not act on any findings  - Plan to resume eliquis today as H&H stable, monitor for bleeding     #Fevers resolved, probable PNA  - CT of chest with bilateral lower lobe opacities (12/16)  - Elevated procalc  - Blood cultures from 12/17 with NGTD  - Legionella neg  - Now on room air  - s/p levaquin x2 doses, dc'd due to qt prolongation  - Continue cefepime x24 hours     #RSV  #COPD without exacerbation   - Tolerating room air, afebrile   - Cont cough suppressant prn  - On Breo-Ellipta, symbicort while admitted and albuterol  - Continuous pulse ox    #Pre-renal azotemia  #CKD3  #Hypernatremia, dehydration - resolved  #Hypokalemia - resolved  - DC IVF  - f/u AM labs     #RLE edema  - NO DVT on ultrasound  - Was on AC as an outpatient    #Multiple skin wounds - present on admission  #Nonhealing right medial malleolar wound, unstageable   - Skin tear right forearm, right heel unstageable pressure wound, unstageable buttock wound and on right great toe.  - Skin wounds do not appear infected  - Previous wound care orders from facility  - Nutritional support w/ vitamins     #Chronic A-Fib on Eliquis  #Chronic diastolic heart failure (E 50-55% 11/29/22)  #HTN  #Bioprosthetic AV  - Echocardiogram 11/29: No effusions, EF 50>55%, mild to mod mitral valve regur  - Plan to restart Eliquis today   - Appreciate GI recs   - Continue home meds - flecainide and atenolol    #Abnormal EKG   - Possible limb lead reversal  - Monitor     #Gout  - Allopurinol    #Advanced Dementia  - Supportive care  - Frequent reorientation    #DVT ppx  - Had been holding eliquis for GI bleed - H&H stable, will resume eliquis   - SCDs    GOC:  DNR/DNI    Dispo: Restart AC then monitor for bleeding. Anticipate DC 24-48 hrs    12/21: Updated daughter Sandi on her cell     Sandi: Home # (preferred) [phone]. Cell # [phone].

## 2022-12-21 NOTE — PROGRESS NOTE ADULT - SUBJECTIVE AND OBJECTIVE BOX
Patient is a 87y old  Female who presents with a chief complaint of Anemia (21 Dec 2022 07:20)    Patient seen and examined at bedside. No overnight events reported. Private aide sitting at bedside. Patient c/o cough.     ALLERGIES:  lisinopril (Unknown)  penicillin (Unknown)  Pineapple (Unknown)    MEDICATIONS  (STANDING):  allopurinol 100 milliGRAM(s) Oral daily  ascorbic acid 500 milliGRAM(s) Oral daily  atenolol  Tablet 50 milliGRAM(s) Oral daily  budesonide 160 MICROgram(s)/formoterol 4.5 MICROgram(s) Inhaler 2 Puff(s) Inhalation two times a day  cefepime   IVPB      cefepime   IVPB 2000 milliGRAM(s) IV Intermittent every 8 hours  flecainide 50 milliGRAM(s) Oral every 12 hours  pantoprazole    Tablet 40 milliGRAM(s) Oral two times a day  zinc sulfate 220 milliGRAM(s) Oral daily    MEDICATIONS  (PRN):  acetaminophen     Tablet .. 650 milliGRAM(s) Oral every 6 hours PRN Temp greater or equal to 38C (100.4F), Mild Pain (1 - 3)  aluminum hydroxide/magnesium hydroxide/simethicone Suspension 30 milliLiter(s) Oral every 4 hours PRN Dyspepsia  guaiFENesin Oral Liquid (Sugar-Free) 100 milliGRAM(s) Oral every 6 hours PRN Cough  melatonin 3 milliGRAM(s) Oral at bedtime PRN Insomnia  ondansetron Injectable 4 milliGRAM(s) IV Push every 8 hours PRN Nausea and/or Vomiting    Vital Signs Last 24 Hrs  T(F): 98 (21 Dec 2022 05:07), Max: 100 (20 Dec 2022 18:49)  HR: 69 (21 Dec 2022 05:07) (69 - 73)  BP: 140/40 (21 Dec 2022 05:07) (117/61 - 155/50)  RR: 18 (21 Dec 2022 05:07) (18 - 19)  SpO2: 94% (21 Dec 2022 05:07) (94% - 96%)    PHYSICAL EXAM:  General: NAD, A/O x 2  ENT: No gross hearing impairment, Moist mucous membranes, no thrush  Neck: Supple, No JVD  Lungs: Rhonchi to right lung, left side to auscultation bilaterally, good air entry, non-labored breathing  Cardio: RRR, S1/S2, No murmur  Abdomen: Soft, Nontender, Nondistended; Bowel sounds present  Extremities: No calf tenderness, No cyanosis. LUE edema.   Psych: Appropriate mood and affect    LABS:                        8.9    11.37 )-----------( 195      ( 21 Dec 2022 08:48 )             29.8     12-21    139  |  107  |  24  ----------------------------<  110  4.3   |  24  |  0.81    Ca    8.8      21 Dec 2022 08:48  Mg     1.6     12-20      Culture - Blood (collected 17 Dec 2022 09:36)  Source: .Blood Blood-Peripheral  Preliminary Report (18 Dec 2022 15:01):    No growth to date.    Culture - Blood (collected 17 Dec 2022 09:36)  Source: .Blood Blood-Peripheral  Preliminary Report (18 Dec 2022 15:01):    No growth to date.      COVID-19 PCR: NotDetemar (11-28-22 @ 11:43)    RADIOLOGY & ADDITIONAL TESTS:    Care Discussed with Consultants/Other Providers:    Patient is a 87y old  Female who presents with a chief complaint of Anemia (21 Dec 2022 07:20)    Patient seen and examined at bedside. No overnight events reported. Private aide sitting at bedside. Patient c/o cough.     ALLERGIES:  lisinopril (Unknown)  penicillin (Unknown)  Pineapple (Unknown)    MEDICATIONS  (STANDING):  allopurinol 100 milliGRAM(s) Oral daily  ascorbic acid 500 milliGRAM(s) Oral daily  atenolol  Tablet 50 milliGRAM(s) Oral daily  budesonide 160 MICROgram(s)/formoterol 4.5 MICROgram(s) Inhaler 2 Puff(s) Inhalation two times a day  cefepime   IVPB      cefepime   IVPB 2000 milliGRAM(s) IV Intermittent every 8 hours  flecainide 50 milliGRAM(s) Oral every 12 hours  pantoprazole    Tablet 40 milliGRAM(s) Oral two times a day  zinc sulfate 220 milliGRAM(s) Oral daily    MEDICATIONS  (PRN):  acetaminophen     Tablet .. 650 milliGRAM(s) Oral every 6 hours PRN Temp greater or equal to 38C (100.4F), Mild Pain (1 - 3)  aluminum hydroxide/magnesium hydroxide/simethicone Suspension 30 milliLiter(s) Oral every 4 hours PRN Dyspepsia  guaiFENesin Oral Liquid (Sugar-Free) 100 milliGRAM(s) Oral every 6 hours PRN Cough  melatonin 3 milliGRAM(s) Oral at bedtime PRN Insomnia  ondansetron Injectable 4 milliGRAM(s) IV Push every 8 hours PRN Nausea and/or Vomiting    Vital Signs Last 24 Hrs  T(F): 98 (21 Dec 2022 05:07), Max: 100 (20 Dec 2022 18:49)  HR: 69 (21 Dec 2022 05:07) (69 - 73)  BP: 140/40 (21 Dec 2022 05:07) (117/61 - 155/50)  RR: 18 (21 Dec 2022 05:07) (18 - 19)  SpO2: 94% (21 Dec 2022 05:07) (94% - 96%)    PHYSICAL EXAM:  General: NAD, A/O x 2  ENT: No gross hearing impairment, Moist mucous membranes, no thrush  Neck: Supple, No JVD  Lungs: Rhonchi to right lung, left side clear to auscultation bilaterally, good air entry, non-labored breathing  Cardio: RRR, S1/S2, No murmur  Abdomen: Soft, Nontender, Nondistended; Bowel sounds present  Extremities: No calf tenderness, No cyanosis. LUE edema.   Psych: Appropriate mood and affect    LABS:                        8.9    11.37 )-----------( 195      ( 21 Dec 2022 08:48 )             29.8     12-21    139  |  107  |  24  ----------------------------<  110  4.3   |  24  |  0.81    Ca    8.8      21 Dec 2022 08:48  Mg     1.6     12-20      Culture - Blood (collected 17 Dec 2022 09:36)  Source: .Blood Blood-Peripheral  Preliminary Report (18 Dec 2022 15:01):    No growth to date.    Culture - Blood (collected 17 Dec 2022 09:36)  Source: .Blood Blood-Peripheral  Preliminary Report (18 Dec 2022 15:01):    No growth to date.    COVID-19 PCR: Kannantemar (11-28-22 @ 11:43)    RADIOLOGY & ADDITIONAL TESTS:    Care Discussed with Consultants/Other Providers:

## 2022-12-21 NOTE — DIETITIAN NUTRITION RISK NOTIFICATION - TREATMENT: THE FOLLOWING DIET HAS BEEN RECOMMENDED
Diet, Regular:   Supplement Feeding Modality:  Oral  Ensure Plus High Protein Cans or Servings Per Day:  1       Frequency:  Three Times a day (12-21-22 @ 09:57) [Active]

## 2022-12-22 LAB
ANION GAP SERPL CALC-SCNC: 7 MMOL/L — SIGNIFICANT CHANGE UP (ref 5–17)
BUN SERPL-MCNC: 21 MG/DL — SIGNIFICANT CHANGE UP (ref 7–23)
CALCIUM SERPL-MCNC: 8.9 MG/DL — SIGNIFICANT CHANGE UP (ref 8.4–10.5)
CHLORIDE SERPL-SCNC: 103 MMOL/L — SIGNIFICANT CHANGE UP (ref 96–108)
CO2 SERPL-SCNC: 27 MMOL/L — SIGNIFICANT CHANGE UP (ref 22–31)
CREAT SERPL-MCNC: 0.82 MG/DL — SIGNIFICANT CHANGE UP (ref 0.5–1.3)
CULTURE RESULTS: SIGNIFICANT CHANGE UP
CULTURE RESULTS: SIGNIFICANT CHANGE UP
EGFR: 70 ML/MIN/1.73M2 — SIGNIFICANT CHANGE UP
GLUCOSE SERPL-MCNC: 108 MG/DL — HIGH (ref 70–99)
HCT VFR BLD CALC: 26.6 % — LOW (ref 34.5–45)
HGB BLD-MCNC: 7.9 G/DL — LOW (ref 11.5–15.5)
MCHC RBC-ENTMCNC: 27 PG — SIGNIFICANT CHANGE UP (ref 27–34)
MCHC RBC-ENTMCNC: 29.7 GM/DL — LOW (ref 32–36)
MCV RBC AUTO: 90.8 FL — SIGNIFICANT CHANGE UP (ref 80–100)
NRBC # BLD: 0 /100 WBCS — SIGNIFICANT CHANGE UP (ref 0–0)
PLATELET # BLD AUTO: 247 K/UL — SIGNIFICANT CHANGE UP (ref 150–400)
POTASSIUM SERPL-MCNC: 3.8 MMOL/L — SIGNIFICANT CHANGE UP (ref 3.5–5.3)
POTASSIUM SERPL-SCNC: 3.8 MMOL/L — SIGNIFICANT CHANGE UP (ref 3.5–5.3)
RBC # BLD: 2.93 M/UL — LOW (ref 3.8–5.2)
RBC # FLD: 15.5 % — HIGH (ref 10.3–14.5)
SODIUM SERPL-SCNC: 137 MMOL/L — SIGNIFICANT CHANGE UP (ref 135–145)
SPECIMEN SOURCE: SIGNIFICANT CHANGE UP
SPECIMEN SOURCE: SIGNIFICANT CHANGE UP
WBC # BLD: 7.65 K/UL — SIGNIFICANT CHANGE UP (ref 3.8–10.5)
WBC # FLD AUTO: 7.65 K/UL — SIGNIFICANT CHANGE UP (ref 3.8–10.5)

## 2022-12-22 PROCEDURE — 99233 SBSQ HOSP IP/OBS HIGH 50: CPT

## 2022-12-22 RX ORDER — POLYETHYLENE GLYCOL 3350 17 G/17G
17 POWDER, FOR SOLUTION ORAL DAILY
Refills: 0 | Status: DISCONTINUED | OUTPATIENT
Start: 2022-12-22 | End: 2022-12-25

## 2022-12-22 RX ORDER — SENNA PLUS 8.6 MG/1
2 TABLET ORAL AT BEDTIME
Refills: 0 | Status: DISCONTINUED | OUTPATIENT
Start: 2022-12-22 | End: 2022-12-25

## 2022-12-22 RX ORDER — OXYCODONE AND ACETAMINOPHEN 5; 325 MG/1; MG/1
1 TABLET ORAL
Qty: 0 | Refills: 0 | DISCHARGE

## 2022-12-22 RX ADMIN — PANTOPRAZOLE SODIUM 40 MILLIGRAM(S): 20 TABLET, DELAYED RELEASE ORAL at 17:44

## 2022-12-22 RX ADMIN — ATENOLOL 50 MILLIGRAM(S): 25 TABLET ORAL at 06:00

## 2022-12-22 RX ADMIN — Medication 650 MILLIGRAM(S): at 12:24

## 2022-12-22 RX ADMIN — Medication 650 MILLIGRAM(S): at 13:24

## 2022-12-22 RX ADMIN — SENNA PLUS 2 TABLET(S): 8.6 TABLET ORAL at 22:43

## 2022-12-22 RX ADMIN — Medication 50 MILLIGRAM(S): at 05:59

## 2022-12-22 RX ADMIN — PANTOPRAZOLE SODIUM 40 MILLIGRAM(S): 20 TABLET, DELAYED RELEASE ORAL at 06:00

## 2022-12-22 RX ADMIN — Medication 500 MILLIGRAM(S): at 12:25

## 2022-12-22 RX ADMIN — BUDESONIDE AND FORMOTEROL FUMARATE DIHYDRATE 2 PUFF(S): 160; 4.5 AEROSOL RESPIRATORY (INHALATION) at 20:59

## 2022-12-22 RX ADMIN — CEFEPIME 100 MILLIGRAM(S): 1 INJECTION, POWDER, FOR SOLUTION INTRAMUSCULAR; INTRAVENOUS at 22:42

## 2022-12-22 RX ADMIN — APIXABAN 2.5 MILLIGRAM(S): 2.5 TABLET, FILM COATED ORAL at 06:00

## 2022-12-22 RX ADMIN — Medication 100 MILLIGRAM(S): at 12:25

## 2022-12-22 RX ADMIN — ZINC SULFATE TAB 220 MG (50 MG ZINC EQUIVALENT) 220 MILLIGRAM(S): 220 (50 ZN) TAB at 12:25

## 2022-12-22 RX ADMIN — CEFEPIME 100 MILLIGRAM(S): 1 INJECTION, POWDER, FOR SOLUTION INTRAMUSCULAR; INTRAVENOUS at 14:56

## 2022-12-22 RX ADMIN — APIXABAN 2.5 MILLIGRAM(S): 2.5 TABLET, FILM COATED ORAL at 17:45

## 2022-12-22 RX ADMIN — Medication 10 MILLIGRAM(S): at 15:04

## 2022-12-22 RX ADMIN — Medication 50 MILLIGRAM(S): at 17:44

## 2022-12-22 RX ADMIN — CEFEPIME 100 MILLIGRAM(S): 1 INJECTION, POWDER, FOR SOLUTION INTRAMUSCULAR; INTRAVENOUS at 05:59

## 2022-12-22 NOTE — PROGRESS NOTE ADULT - ASSESSMENT
88 yo female with PMH A Fib on eliquis, AV stenosis s/p TAVR, HTN, asthma, HFpEF, pulmonary HTN, dementia, recently admitted Nov 11/28-11/30/2022 for non healing right ankle wound w/ concern for sepsis, [facility] Also found to have RSV    #Normocytic anemia, possible due to GI bleed  #Hepatic lesion and pulmonary nodules seen on CT  - Fecal occult was negative on 11/28, though pt reported to be guaiac positive prior to admission  - H&H stable, pt is s/p 1 unit PRBCs on 12/16  - Cont PPI BID  - GI recs noted, no plan for Colonoscopy/EGD - daughter concerned about possible risks of having a procedure with underlying co-morbidities as well as would likely not act on any findings  - eliquis resumed on 12/21 after H&H remained stable -  monitor for bleeding while on eliquis at least 24 hrs. Pt has not had a BM since 12/17, will order a bowel regimen.     #Fevers resolved, probable PNA  - CT of chest with bilateral lower lobe opacities (12/16)  - Elevated procalc  - Blood cultures from 12/17 with NGTD  - Legionella neg  - Now on room air  - s/p levaquin x2 doses, dc'd due to qt prolongation  - Continue cefepime x24 hours     #RSV  #COPD without exacerbation   - Tolerating room air, afebrile   - Cont cough suppressant prn  - On Breo-Ellipta, symbicort while admitted and albuterol  - Continuous pulse ox    #Pre-renal azotemia  #CKD3  #Hypernatremia, dehydration - resolved  #Hypokalemia - resolved  - DC IVF  - f/u AM labs     #RLE edema  - NO DVT on ultrasound  - Was on AC as an outpatient    #Multiple skin wounds - present on admission  #Nonhealing right medial malleolar wound, unstageable   - Skin tear right forearm, right heel unstageable pressure wound, unstageable buttock wound and on right great toe.  - Skin wounds do not appear infected  - Previous wound care orders from facility  - Nutritional support w/ vitamins     #Chronic A-Fib on Eliquis  #Chronic diastolic heart failure (E 50-55% 11/29/22)  #HTN  #Bioprosthetic AV  - Echocardiogram 11/29: No effusions, EF 50>55%, mild to mod mitral valve regur  -eliquis restarted on 12/21  - Appreciate GI recs   - Continue home meds - flecainide and atenolol    #Gout  - Allopurinol    #Advanced Dementia  - Supportive care  - Frequent reorientation    #DVT ppx  - Had been holding eliquis for GI bleed - H&H stable, will resume eliquis   - SCDs    GOC:  DNR/DNI    Dispo: Restart AC then monitor for bleeding. Anticipate DC 24-48 hrs    12/22: left VM for daughter w/ update and callback number. attempted to reach at home and on cell.    Sandi: Home # (preferred) [phone]. Cell # [phone].

## 2022-12-22 NOTE — PROGRESS NOTE ADULT - SUBJECTIVE AND OBJECTIVE BOX
Patient is a 87y old  Female who presents with a chief complaint of Anemia (21 Dec 2022 15:06)      24 HOUR EVENTS:  No overnight events reported.     SUBJECTIVE:  Patient seen and examined at bedside. She was arousable but sleeping. When asked how she is doing, she said "not good."  Her aid at bedside believes she is improved - has noticed less coughing. Does report she has had a poor appetite, but is drinking the nutritional supplements.    ALLERGIES:  lisinopril (Unknown)  penicillin (Unknown)  Pineapple (Unknown)    MEDICATIONS  (STANDING):  allopurinol 100 milliGRAM(s) Oral daily  apixaban 2.5 milliGRAM(s) Oral two times a day  ascorbic acid 500 milliGRAM(s) Oral daily  atenolol  Tablet 50 milliGRAM(s) Oral daily  budesonide 160 MICROgram(s)/formoterol 4.5 MICROgram(s) Inhaler 2 Puff(s) Inhalation two times a day  cefepime   IVPB      cefepime   IVPB 2000 milliGRAM(s) IV Intermittent every 8 hours  flecainide 50 milliGRAM(s) Oral every 12 hours  pantoprazole    Tablet 40 milliGRAM(s) Oral two times a day  zinc sulfate 220 milliGRAM(s) Oral daily    MEDICATIONS  (PRN):  acetaminophen     Tablet .. 650 milliGRAM(s) Oral every 6 hours PRN Temp greater or equal to 38C (100.4F), Mild Pain (1 - 3)  aluminum hydroxide/magnesium hydroxide/simethicone Suspension 30 milliLiter(s) Oral every 4 hours PRN Dyspepsia  guaiFENesin Oral Liquid (Sugar-Free) 100 milliGRAM(s) Oral every 6 hours PRN Cough  melatonin 3 milliGRAM(s) Oral at bedtime PRN Insomnia  ondansetron Injectable 4 milliGRAM(s) IV Push every 8 hours PRN Nausea and/or Vomiting    Vital Signs Last 24 Hrs  T(F): 98.6 (22 Dec 2022 05:43), Max: 98.7 (21 Dec 2022 15:42)  HR: 78 (22 Dec 2022 05:43) (68 - 78)  BP: 160/54 (22 Dec 2022 05:43) (149/47 - 160/54)  RR: 18 (22 Dec 2022 05:43) (18 - 19)  SpO2: 95% (22 Dec 2022 05:43) (95% - 98%)  I&O's Summary    PHYSICAL EXAM:  General: NAD, lethargic but arousable  ENT: Moist mucous membranes, no thrush  Neck: Supple, No JVD  Lungs: Clear to auscultation bilaterally, good air entry, non-labored breathing  Cardio: RRR, S1/S2, No murmur  Abdomen: Soft, Nontender, Nondistended; Bowel sounds present  Extremities: No calf tenderness, No pitting edema, no contractures.    LABS:                        7.9    7.65  )-----------( 247      ( 22 Dec 2022 09:40 )             26.6     12-22    137  |  103  |  21  ----------------------------<  108  3.8   |  27  |  0.82    Ca    8.9      22 Dec 2022 09:40  Mg     1.6     12-20          Culture - Blood (collected 17 Dec 2022 09:36)  Source: .Blood Blood-Peripheral  Preliminary Report (18 Dec 2022 15:01):    No growth to date.    Culture - Blood (collected 17 Dec 2022 09:36)  Source: .Blood Blood-Peripheral  Preliminary Report (18 Dec 2022 15:01):    No growth to date.      COVID-19 PCR: NotDetec (12-21-22 @ 11:05)  COVID-19 PCR: NotDetec (11-28-22 @ 11:43)    RADIOLOGY & ADDITIONAL TESTS:    Care Discussed with Consultants/Other Providers:

## 2022-12-23 LAB
ANION GAP SERPL CALC-SCNC: 5 MMOL/L — SIGNIFICANT CHANGE UP (ref 5–17)
BUN SERPL-MCNC: 26 MG/DL — HIGH (ref 7–23)
CALCIUM SERPL-MCNC: 8.7 MG/DL — SIGNIFICANT CHANGE UP (ref 8.4–10.5)
CHLORIDE SERPL-SCNC: 106 MMOL/L — SIGNIFICANT CHANGE UP (ref 96–108)
CO2 SERPL-SCNC: 28 MMOL/L — SIGNIFICANT CHANGE UP (ref 22–31)
CREAT SERPL-MCNC: 0.88 MG/DL — SIGNIFICANT CHANGE UP (ref 0.5–1.3)
EGFR: 64 ML/MIN/1.73M2 — SIGNIFICANT CHANGE UP
GLUCOSE SERPL-MCNC: 112 MG/DL — HIGH (ref 70–99)
HCT VFR BLD CALC: 25.6 % — LOW (ref 34.5–45)
HGB BLD-MCNC: 7.7 G/DL — LOW (ref 11.5–15.5)
MCHC RBC-ENTMCNC: 27.5 PG — SIGNIFICANT CHANGE UP (ref 27–34)
MCHC RBC-ENTMCNC: 30.1 GM/DL — LOW (ref 32–36)
MCV RBC AUTO: 91.4 FL — SIGNIFICANT CHANGE UP (ref 80–100)
NRBC # BLD: 0 /100 WBCS — SIGNIFICANT CHANGE UP (ref 0–0)
PLATELET # BLD AUTO: 239 K/UL — SIGNIFICANT CHANGE UP (ref 150–400)
POTASSIUM SERPL-MCNC: 3.5 MMOL/L — SIGNIFICANT CHANGE UP (ref 3.5–5.3)
POTASSIUM SERPL-SCNC: 3.5 MMOL/L — SIGNIFICANT CHANGE UP (ref 3.5–5.3)
RBC # BLD: 2.8 M/UL — LOW (ref 3.8–5.2)
RBC # FLD: 15.4 % — HIGH (ref 10.3–14.5)
SODIUM SERPL-SCNC: 139 MMOL/L — SIGNIFICANT CHANGE UP (ref 135–145)
WBC # BLD: 7.68 K/UL — SIGNIFICANT CHANGE UP (ref 3.8–10.5)
WBC # FLD AUTO: 7.68 K/UL — SIGNIFICANT CHANGE UP (ref 3.8–10.5)

## 2022-12-23 PROCEDURE — 99233 SBSQ HOSP IP/OBS HIGH 50: CPT

## 2022-12-23 PROCEDURE — 99223 1ST HOSP IP/OBS HIGH 75: CPT

## 2022-12-23 PROCEDURE — 99222 1ST HOSP IP/OBS MODERATE 55: CPT

## 2022-12-23 PROCEDURE — 99497 ADVNCD CARE PLAN 30 MIN: CPT | Mod: 25

## 2022-12-23 PROCEDURE — 93971 EXTREMITY STUDY: CPT | Mod: 26,RT

## 2022-12-23 RX ORDER — ENOXAPARIN SODIUM 100 MG/ML
40 INJECTION SUBCUTANEOUS EVERY 24 HOURS
Refills: 0 | Status: DISCONTINUED | OUTPATIENT
Start: 2022-12-23 | End: 2022-12-25

## 2022-12-23 RX ORDER — MORPHINE SULFATE 50 MG/1
2 CAPSULE, EXTENDED RELEASE ORAL EVERY 4 HOURS
Refills: 0 | Status: DISCONTINUED | OUTPATIENT
Start: 2022-12-23 | End: 2022-12-23

## 2022-12-23 RX ORDER — ENOXAPARIN SODIUM 100 MG/ML
60 INJECTION SUBCUTANEOUS EVERY 12 HOURS
Refills: 0 | Status: DISCONTINUED | OUTPATIENT
Start: 2022-12-23 | End: 2022-12-23

## 2022-12-23 RX ORDER — MORPHINE SULFATE 50 MG/1
3 CAPSULE, EXTENDED RELEASE ORAL
Refills: 0 | Status: DISCONTINUED | OUTPATIENT
Start: 2022-12-23 | End: 2022-12-25

## 2022-12-23 RX ORDER — TRAMADOL HYDROCHLORIDE 50 MG/1
25 TABLET ORAL ONCE
Refills: 0 | Status: DISCONTINUED | OUTPATIENT
Start: 2022-12-23 | End: 2022-12-23

## 2022-12-23 RX ORDER — MORPHINE SULFATE 50 MG/1
1 CAPSULE, EXTENDED RELEASE ORAL ONCE
Refills: 0 | Status: DISCONTINUED | OUTPATIENT
Start: 2022-12-23 | End: 2022-12-23

## 2022-12-23 RX ADMIN — Medication 650 MILLIGRAM(S): at 07:56

## 2022-12-23 RX ADMIN — Medication 500 MILLIGRAM(S): at 13:12

## 2022-12-23 RX ADMIN — Medication 650 MILLIGRAM(S): at 13:12

## 2022-12-23 RX ADMIN — ZINC SULFATE TAB 220 MG (50 MG ZINC EQUIVALENT) 220 MILLIGRAM(S): 220 (50 ZN) TAB at 13:11

## 2022-12-23 RX ADMIN — Medication 650 MILLIGRAM(S): at 14:12

## 2022-12-23 RX ADMIN — MORPHINE SULFATE 1 MILLIGRAM(S): 50 CAPSULE, EXTENDED RELEASE ORAL at 16:19

## 2022-12-23 RX ADMIN — Medication 50 MILLIGRAM(S): at 05:58

## 2022-12-23 RX ADMIN — APIXABAN 2.5 MILLIGRAM(S): 2.5 TABLET, FILM COATED ORAL at 05:59

## 2022-12-23 RX ADMIN — TRAMADOL HYDROCHLORIDE 25 MILLIGRAM(S): 50 TABLET ORAL at 14:00

## 2022-12-23 RX ADMIN — TRAMADOL HYDROCHLORIDE 25 MILLIGRAM(S): 50 TABLET ORAL at 14:48

## 2022-12-23 RX ADMIN — MORPHINE SULFATE 1 MILLIGRAM(S): 50 CAPSULE, EXTENDED RELEASE ORAL at 16:04

## 2022-12-23 RX ADMIN — PANTOPRAZOLE SODIUM 40 MILLIGRAM(S): 20 TABLET, DELAYED RELEASE ORAL at 05:59

## 2022-12-23 RX ADMIN — ATENOLOL 50 MILLIGRAM(S): 25 TABLET ORAL at 05:59

## 2022-12-23 RX ADMIN — Medication 100 MILLIGRAM(S): at 13:13

## 2022-12-23 RX ADMIN — CEFEPIME 100 MILLIGRAM(S): 1 INJECTION, POWDER, FOR SOLUTION INTRAMUSCULAR; INTRAVENOUS at 05:59

## 2022-12-23 RX ADMIN — Medication 50 MILLIGRAM(S): at 21:40

## 2022-12-23 RX ADMIN — SENNA PLUS 2 TABLET(S): 8.6 TABLET ORAL at 21:40

## 2022-12-23 RX ADMIN — MORPHINE SULFATE 2 MILLIGRAM(S): 50 CAPSULE, EXTENDED RELEASE ORAL at 15:04

## 2022-12-23 RX ADMIN — MORPHINE SULFATE 2 MILLIGRAM(S): 50 CAPSULE, EXTENDED RELEASE ORAL at 15:19

## 2022-12-23 RX ADMIN — Medication 650 MILLIGRAM(S): at 05:58

## 2022-12-23 RX ADMIN — BUDESONIDE AND FORMOTEROL FUMARATE DIHYDRATE 2 PUFF(S): 160; 4.5 AEROSOL RESPIRATORY (INHALATION) at 08:30

## 2022-12-23 RX ADMIN — BUDESONIDE AND FORMOTEROL FUMARATE DIHYDRATE 2 PUFF(S): 160; 4.5 AEROSOL RESPIRATORY (INHALATION) at 20:46

## 2022-12-23 NOTE — CONSULT NOTE ADULT - CONVERSATION DETAILS
Pt has dementia, chf, upper extremity dvt with gi bleed and afib.  Also multiple decubiti and is in severe pain.  Daughter was at bedside.  We discussed about continuing anti coagulation with her gi bleed and anemia and dvt.  All options are hazardous regarding blood thinners.  Daughter is favoring stopping blood thinners. She also has severe pain in hips and legs problaly related to her skin ulcers.  Tramadol given with partial relief.  MS to be given prn.  Patient has multiple medical issues and requires strong pain meds to be pain free.  She will benefit form inpatient hospice care.  Hospice dx:  chf, failure to thrive, dementia.  Case reviewed with daughter Sandi at bedside

## 2022-12-23 NOTE — CONSULT NOTE ADULT - SUBJECTIVE AND OBJECTIVE BOX
This is an 87 year old woman with a history of Afib on Elqiuis 2.5mg BID , HTN, COPD , CHF, Dementia, patient admitted for GI bleeding, Hg 7.4 at assisted living facility.  Patient found to have a sizable  DVT in the right axilla to the brachial vein.    MEDICATIONS  (STANDING):  allopurinol 100 milliGRAM(s) Oral daily  ascorbic acid 500 milliGRAM(s) Oral daily  atenolol  Tablet 50 milliGRAM(s) Oral daily  budesonide 160 MICROgram(s)/formoterol 4.5 MICROgram(s) Inhaler 2 Puff(s) Inhalation two times a day  enoxaparin Injectable 40 milliGRAM(s) SubCutaneous every 24 hours  flecainide 50 milliGRAM(s) Oral every 12 hours  pantoprazole    Tablet 40 milliGRAM(s) Oral two times a day  polyethylene glycol 3350 17 Gram(s) Oral daily  senna 2 Tablet(s) Oral at bedtime  zinc sulfate 220 milliGRAM(s) Oral daily    MEDICATIONS  (PRN):  acetaminophen     Tablet .. 650 milliGRAM(s) Oral every 6 hours PRN Temp greater or equal to 38C (100.4F), Mild Pain (1 - 3)  aluminum hydroxide/magnesium hydroxide/simethicone Suspension 30 milliLiter(s) Oral every 4 hours PRN Dyspepsia  guaiFENesin Oral Liquid (Sugar-Free) 100 milliGRAM(s) Oral every 6 hours PRN Cough  LORazepam   Injectable 0.5 milliGRAM(s) IV Push every 4 hours PRN Anxiety  melatonin 3 milliGRAM(s) Oral at bedtime PRN Insomnia  morphine  - Injectable 3 milliGRAM(s) IV Push every 3 hours PRN Moderate Pain (4 - 6)  ondansetron Injectable 4 milliGRAM(s) IV Push every 8 hours PRN Nausea and/or Vomiting    Vital Signs Last 24 Hrs  T(C): 36.8 (12-23-22 @ 15:38), Max: 37.9 (12-23-22 @ 06:53)  T(F): 98.3 (12-23-22 @ 15:38), Max: 100.2 (12-23-22 @ 06:53)  HR: 109 (12-23-22 @ 15:38) (62 - 109)  BP: 153/56 (12-23-22 @ 15:38) (125/53 - 153/56)  BP(mean): --  RR: 16 (12-23-22 @ 15:38) (16 - 17)  SpO2: 94% (12-23-22 @ 15:38) (94% - 97%)    12-23    139  |  106  |  26<H>  ----------------------------<  112<H>  3.5   |  28  |  0.88    Ca    8.7      23 Dec 2022 06:05                          7.7    7.68  )-----------( 239      ( 23 Dec 2022 06:05 )             25.6

## 2022-12-23 NOTE — CONSULT NOTE ADULT - ASSESSMENT
88 yo female with PMH A Fib on eliquis, AV stenosis s/p TAVR, HTN, asthma, HFpEF, pulmonary HTN, dementia, recently admitted Nov 11/28-11/30/2022 for non healing right ankle wound w/ concern for sepsis, sent by PCP due to +fecal occult and [facility] 7.4 [facility]. Also found to have RSV  Palliative:  previewed with daughter.  Pt has steadily declining status and is now in severe pain.  tramadol was given    MS to be given for breakthrough pain  #Normocytic anemia, possible due to GI bleed  #Hepatic lesion and pulmonary nodules seen on CT  - Fecal occult was negative on 11/28, though pt reported to be guaiac positive prior to admission  - pt is s/p 1 unit PRBCs on 12/16  - Cont PPI BID  - GI recs noted, no plan for Colonoscopy/EGD - daughter concerned about possible risks of having a procedure with underlying co-morbidities as well as would likely not act on any findings  - eliquis resumed on 12/21 after H&H remained stable -  monitor for bleeding while on eliquis at least 24 hrs. Pt has not had a BM since 12/17, bowel regimen ordered.       #Fever, probable PNA  - CT of chest with bilateral lower lobe opacities (12/16)  - Elevated procalc  - Blood cultures from 12/17 with NGTD  - Legionella neg  - Now on room air  - s/p levaquin x2 doses, dc'd due to qt prolongation  - cefepime discontinued today, did spike a temp to 100.2 overnight. Will get monitoring blood cultures w/ next labwork. will check LUE duplex. She has no leukocytosis.    # LUE swelling  - will f/u LUE duplex.     #RSV  #COPD without exacerbation   - Tolerating room air, afebrile   - Cont cough suppressant prn  - On Breo-Ellipta, symbicort while admitted and albuterol  - Continuous pulse ox    #Pre-renal azotemia  #CKD3  #Hypernatremia, dehydration - resolved  #Hypokalemia - resolved  - DC IVF  - f/u AM labs     #RLE edema  - NO DVT on ultrasound  - Was on AC as an outpatient    #Multiple skin wounds - present on admission  #Nonhealing right medial malleolar wound, unstageable   - Skin tear right forearm, right heel unstageable pressure wound, unstageable buttock wound and on right great toe.  - Skin wounds do not appear infected  - Previous wound care orders from facility  - Nutritional support w/ vitamins     #Chronic A-Fib on Eliquis  #Chronic diastolic heart failure (E 50-55% 11/29/22)  #HTN  #Bioprosthetic AV  - Echocardiogram 11/29: No effusions, EF 50>55%, mild to mod mitral valve regur  -eliquis restarted on 12/21. Pt is without a Cardiologist. I have consulted Dr. Resendiz to discuss pt's stroke risk of being OFF of AC. Given anemia without an identified source, it would be reasonable for daughter to discontinue eliquis.  - Continue home meds - flecainide and atenolol    #Gout  - Allopurinol    #Advanced Dementia  - Supportive care  - Frequent reorientation    #DVT ppx  - Had been holding eliquis for GI bleed - eliquis resumed.  - SCDs    GOC:  DNR/DNI    Dispo: Restarted on AC, monitoring for bleeding. Pending Cardio and Palliative care consults.     Palliative:  consider dcing anticoagulation and hospice evaluation.

## 2022-12-23 NOTE — DISCHARGE NOTE NURSING/CASE MANAGEMENT/SOCIAL WORK - NSDCPEFALRISK_GEN_ALL_CORE
For information on Fall & Injury Prevention, visit: [email or web address] OR  [email or web address] OR  https://www.cdc.gov/steadi/patient.html

## 2022-12-23 NOTE — CONSULT NOTE ADULT - SUBJECTIVE AND OBJECTIVE BOX
HPI:  86 yo female with PMH A Fib on eliquis, HTN, COPD, HFpEF, advanced dementia, recently admitted Nov 11/28-11/30/2022 for non healing right ankle wound w/ concern for sepsis, presents at the request of PCP due to +fecal occult and Hemoglobin of 7.4 at [facility].  Pt unable to provide a history due to dementia. Patient continues to call out "help." She does not indicate any specific complaints. She has a cough.     ED course:   80 mg IV PPI.  500 cc IV fluid bolus.  Found to be RSV +  hemoglobin 8.0 (15 Dec 2022 16:54)    Palliative:  pt has multiple unstageable decubiti and is in severe pain in the l hip and legs  PAST MEDICAL & SURGICAL HISTORY:  HTN (hypertension)      Atrial fibrillation      COPD without exacerbation      Gout      Pulmonary hypertension      Acute combined systolic and diastolic congestive heart failure      S/P ORIF (open reduction internal fixation) fracture      History of repair of hip fracture          SOCIAL HISTORY:    Admitted from:   assisted living    Substance abuse history:   denies           Tobacco hx:                  Alcohol hx:              Home Opioid hx:  Gnosticist:                                    Preferred Language:    Surrogate/HCP/Guardian:   Sandi Guzmán         Phone#: [phone]    FAMILY HISTORY:    Baseline ADLs (prior to admission): severely debilitated    Allergies    lisinopril (Unknown)  penicillin (Unknown)  Pineapple (Unknown)    Intolerances      Present Symptoms:   Dyspnea: n  Nausea/Vomitinng: n  Anxiety:n  Depressed n  Fatigue:n  Loss of appetite: y  Pain:  10/10                              location: rt hip and generalized pain         Review of Systems: [All others negative or Unable to obtain due to poor mentation]    MEDICATIONS  (STANDING):  allopurinol 100 milliGRAM(s) Oral daily  ascorbic acid 500 milliGRAM(s) Oral daily  atenolol  Tablet 50 milliGRAM(s) Oral daily  budesonide 160 MICROgram(s)/formoterol 4.5 MICROgram(s) Inhaler 2 Puff(s) Inhalation two times a day  enoxaparin Injectable 60 milliGRAM(s) SubCutaneous every 12 hours  flecainide 50 milliGRAM(s) Oral every 12 hours  pantoprazole    Tablet 40 milliGRAM(s) Oral two times a day  polyethylene glycol 3350 17 Gram(s) Oral daily  senna 2 Tablet(s) Oral at bedtime  zinc sulfate 220 milliGRAM(s) Oral daily    MEDICATIONS  (PRN):  acetaminophen     Tablet .. 650 milliGRAM(s) Oral every 6 hours PRN Temp greater or equal to 38C (100.4F), Mild Pain (1 - 3)  aluminum hydroxide/magnesium hydroxide/simethicone Suspension 30 milliLiter(s) Oral every 4 hours PRN Dyspepsia  guaiFENesin Oral Liquid (Sugar-Free) 100 milliGRAM(s) Oral every 6 hours PRN Cough  melatonin 3 milliGRAM(s) Oral at bedtime PRN Insomnia  morphine  - Injectable 2 milliGRAM(s) IV Push every 4 hours PRN Moderate Pain (4 - 6)  ondansetron Injectable 4 milliGRAM(s) IV Push every 8 hours PRN Nausea and/or Vomiting      PHYSICAL EXAM:    Vital Signs Last 24 Hrs  T(C): 37.9 (23 Dec 2022 06:53), Max: 37.9 (23 Dec 2022 06:53)  T(F): 100.2 (23 Dec 2022 06:53), Max: 100.2 (23 Dec 2022 06:53)  HR: 62 (23 Dec 2022 08:31) (62 - 82)  BP: 125/53 (23 Dec 2022 06:53) (113/48 - 132/66)  BP(mean): --  RR: 16 (23 Dec 2022 06:53) (16 - 18)  SpO2: 97% (23 Dec 2022 08:31) (94% - 97%)    Parameters below as of 23 Dec 2022 08:31  Patient On (Oxygen Delivery Method): nasal cannula        General: alert  oriented x2 ____    [lethargic distressed cachexia  nonverbal  unarousable verbal]  Karnofsky Performance Score/Palliative Performance Status Version2:   20 %  PPSV:  HEENT: normal   Lungs: comfortable  CV: normal  GI: normal    : normal    Musculoskeletal:   weakness   Skin: multiple unstageable  pressure ulcer  Neuro: moderate cognitive impairment dsyphagia  Oral intake ability: minimal       LABS:                        7.7    7.68  )-----------( 239      ( 23 Dec 2022 06:05 )             25.6     12-23    139  |  106  |  26<H>  ----------------------------<  112<H>  3.5   |  28  |  0.88    Ca    8.7      23 Dec 2022 06:05          RADIOLOGY & ADDITIONAL STUDIES:    ADVANCE DIRECTIVES:   Advanced Care Planning discussion total time spent:

## 2022-12-23 NOTE — CONSULT NOTE ADULT - SUBJECTIVE AND OBJECTIVE BOX
Chief Complaint:  88 yo female with PMH A Fib on eliquis, HTN, COPD, HFpEF, advanced dementia, recently admitted Nov 11/28-11/30/2022 for non healing right ankle wound w/ concern for sepsis, presents at the request of PCP due to +fecal occult and Hemoglobin of 7.4 at assisted.  Pt unable to provide a history due to dementia. Patient continues to call out "help." She does not indicate any specific complaints. She has a cough. history obtained from daughter Sandi patient has a history of aortic aneurysm and subsequently underwent tAVR around 2016 in [place] she was placed on warfarin  and recently transitioned to apixiban now dced due to bleeding    HPI:    PMH:   HTN (hypertension)    Atrial fibrillation    COPD without exacerbation    Gout    Pulmonary hypertension    Acute combined systolic and diastolic congestive heart failure      PSH:   S/P ORIF (open reduction internal fixation) fracture    History of repair of hip fracture      Family History:  FAMILY HISTORY:      Social History:  Smoking:  Alcohol:  Drugs:    Allergies:  lisinopril (Unknown)  penicillin (Unknown)  Pineapple (Unknown)      Medications:  acetaminophen     Tablet .. 650 milliGRAM(s) Oral every 6 hours PRN  allopurinol 100 milliGRAM(s) Oral daily  aluminum hydroxide/magnesium hydroxide/simethicone Suspension 30 milliLiter(s) Oral every 4 hours PRN  ascorbic acid 500 milliGRAM(s) Oral daily  atenolol  Tablet 50 milliGRAM(s) Oral daily  budesonide 160 MICROgram(s)/formoterol 4.5 MICROgram(s) Inhaler 2 Puff(s) Inhalation two times a day  enoxaparin Injectable 60 milliGRAM(s) SubCutaneous every 12 hours  flecainide 50 milliGRAM(s) Oral every 12 hours  guaiFENesin Oral Liquid (Sugar-Free) 100 milliGRAM(s) Oral every 6 hours PRN  melatonin 3 milliGRAM(s) Oral at bedtime PRN  ondansetron Injectable 4 milliGRAM(s) IV Push every 8 hours PRN  pantoprazole    Tablet 40 milliGRAM(s) Oral two times a day  polyethylene glycol 3350 17 Gram(s) Oral daily  senna 2 Tablet(s) Oral at bedtime  zinc sulfate 220 milliGRAM(s) Oral daily      REVIEW OF SYSTEMS:  CONSTITUTIONAL: No fever, weight loss, or fatigue  EYES: No eye pain, visual disturbances, or discharge  ENMT:  No difficulty hearing, tinnitus, vertigo; No sinus or throat pain  NECK: No pain or stiffness  BREASTS: No pain, masses, or nipple discharge  RESPIRATORY: No cough, wheezing, chills or hemoptysis; No shortness of breath  CARDIOVASCULAR: No chest pain, palpitations, dizziness, or leg swelling  GASTROINTESTINAL: No abdominal or epigastric pain. No nausea, vomiting, or hematemesis; No diarrhea or constipation. No melena or hematochezia.  GENITOURINARY: No dysuria, frequency, hematuria, or incontinence  NEUROLOGICAL: No headaches, memory loss, loss of strength, numbness, or tremors  SKIN: No itching, burning, rashes, or lesions   LYMPH NODES: No enlarged glands  ENDOCRINE: No heat or cold intolerance; No hair loss  MUSCULOSKELETAL: No joint pain or swelling; No muscle, back, or extremity pain  PSYCHIATRIC: No depression, anxiety, mood swings, or difficulty sleeping  HEME/LYMPH: No easy bruising, or bleeding gums  ALLERY AND IMMUNOLOGIC: No hives or eczema    Physical Exam:  T(C): 37.9 (12-23-22 @ 06:53), Max: 37.9 (12-23-22 @ 06:53)  HR: 62 (12-23-22 @ 08:31) (62 - 82)  BP: 125/53 (12-23-22 @ 06:53) (113/48 - 132/66)  RR: 16 (12-23-22 @ 06:53) (16 - 18)  SpO2: 97% (12-23-22 @ 08:31) (94% - 97%)  Wt(kg): --    GENERAL: NAD, lethargic thin cachetic unable to give history.  HEAD:  Atraumatic, Normocephalic  EYES: EOMI, conjunctiva and sclera clear  ENT: Moist mucous membranes,  NECK: Supple, No JVD, no bruits  CHEST/LUNG: Clear to percussion bilaterally; No rales, rhonchi, wheezing, or rubs  HEART: Regular rate and rhythm; No murmurs, rubs, or gallops PMI non displaced.  ABDOMEN: Soft, Nontender, Nondistended; Bowel sounds present  EXTREMITIES:  2+ Peripheral Pulses, No clubbing, cyanosis, or edema  SKIN: No rashes or lesions  NERVOUS SYSTEM:  Cranial Nerves II-XII intact     Cardiovascular Diagnostic Testing:  ECG:    < from: 12 Lead ECG (12.19.22 @ 14:00) >  Diagnosis Line Sinus rhythm withpremature atrial complexes  with 1st degree AV block  ST and T wave abnormality, consider anterolateral ischemia  Poor baseline limits further evaluation, repeat ECG  Abnormal ECG      Confirmed by Steven Kan ([id number]) on 12/20/2022 7:39:44 AM    < end of copied text >      ECHO:    < from: TTE Echo Complete w/o Contrast w/ Doppler (11.29.22 @ 08:20) >    Summary:   1. Technically difficult study with poor endocardial visualization.   2. Normal global left ventricular systolic function.   3. Left ventricular ejection fraction, by visual estimation, is 50 to   55%.   4. Normal left ventricular internal cavity size.   5. Moderate upper septal hypertrophy (1.3 cm). Abnormal septal motion   which may be due to conduction delay. Mild inferior wall hypokinesis.   6. Normal right ventricular size and function.   7. Possible moderator band (fibrinous band) visualized along the right   ventricle apex.   8. The left atrium is not well visualized, appears dilated.   9. The right atrium is not well visualized, appears generally normal in   size.  10. Mitral annular calcification present. There appears to be possible   mitral valve repair/prosthesis (images not well visualized).  11. Mild to moderate mitral valve regurgitation.  12. Mild tricuspid regurgitation.  13. There is a bioprosthetic valve in the aortic position with peak   velocity within normal limits. Mild paravalvular regurgitation.  14. Top normal sized proximal ascending aorta (3.6 cm).  15. Thereis no evidence of pericardial effusion.    [id number] Ion Kan MD Electronically signed on   11/29/2022 at 12:15:55 PM        *** Final ***    < end of copied text >      Labs:                        7.7    7.68  )-----------( 239      ( 23 Dec 2022 06:05 )             25.6     12-23    139  |  106  |  26<H>  ----------------------------<  112<H>  3.5   |  28  |  0.88    Ca    8.7      23 Dec 2022 06:05      Imaging:      < from: TTE Echo Complete w/o Contrast w/ Doppler (11.29.22 @ 08:20) >  Summary:   1. Technically difficult study with poor endocardial visualization.   2. Normal global left ventricular systolic function.   3. Left ventricular ejection fraction, by visual estimation, is 50 to   55%.   4. Normal left ventricular internal cavity size.   5. Moderate upper septal hypertrophy (1.3 cm). Abnormal septal motion   which may be due to conduction delay. Mild inferior wall hypokinesis.   6. Normal right ventricular size and function.   7. Possible moderator band (fibrinous band) visualized along the right   ventricle apex.   8. The left atrium is not well visualized, appears dilated.   9. The right atrium is not well visualized, appears generally normal in   size.  10. Mitral annular calcification present. There appears to be possible   mitral valve repair/prosthesis (images not well visualized).  11. Mild to moderate mitral valve regurgitation.  12. Mild tricuspid regurgitation.  13. There is a bioprosthetic valve in the aortic position with peak   velocity within normal limits. Mild paravalvular regurgitation.  14. Top normal sized proximal ascending aorta (3.6 cm).  15. Thereis no evidence of pericardial effusion.    [id number] Ion Kan MD Electronically signed on   11/29/2022 at 12:15:55 PM    *** Final ***    < end of copied text >      impression  atrial fibrillation and anticoagulation  complicated woman previously  on anticoagulation for afib now with DVT and advanced organic brain syndrome. ideally would anticoagulate for DVT however the risk is greater than the benefit given a life threatening anemia with hb 7 and GI bleeding and rhythm rsr with EF 50-55% negative blood cultures . patient also is not a candidate for invasive procedures.  all routes remain hazardous from a cardiac standpoint the prognosis is poor. i do not expect this patient to survive.       discussed in detail with daughter Sandi by phone and with dr wharton

## 2022-12-23 NOTE — PROGRESS NOTE ADULT - SUBJECTIVE AND OBJECTIVE BOX
Patient is a 87y old  Female who presents with a chief complaint of Anemia (22 Dec 2022 13:44)      24 HOUR EVENTS:  No overnight events reported.     SUBJECTIVE:  Patient seen and examined at bedside. She is arousable, but lethargic. When she wakes up, says she is not good. Denies anything specific.   Aid at bedside says she has a poor appetite. Nursing says she has had Poor PO intake.    ALLERGIES:  lisinopril (Unknown)  penicillin (Unknown)  Pineapple (Unknown)    MEDICATIONS  (STANDING):  allopurinol 100 milliGRAM(s) Oral daily  apixaban 2.5 milliGRAM(s) Oral two times a day  ascorbic acid 500 milliGRAM(s) Oral daily  atenolol  Tablet 50 milliGRAM(s) Oral daily  budesonide 160 MICROgram(s)/formoterol 4.5 MICROgram(s) Inhaler 2 Puff(s) Inhalation two times a day  flecainide 50 milliGRAM(s) Oral every 12 hours  pantoprazole    Tablet 40 milliGRAM(s) Oral two times a day  polyethylene glycol 3350 17 Gram(s) Oral daily  senna 2 Tablet(s) Oral at bedtime  zinc sulfate 220 milliGRAM(s) Oral daily    MEDICATIONS  (PRN):  acetaminophen     Tablet .. 650 milliGRAM(s) Oral every 6 hours PRN Temp greater or equal to 38C (100.4F), Mild Pain (1 - 3)  aluminum hydroxide/magnesium hydroxide/simethicone Suspension 30 milliLiter(s) Oral every 4 hours PRN Dyspepsia  guaiFENesin Oral Liquid (Sugar-Free) 100 milliGRAM(s) Oral every 6 hours PRN Cough  melatonin 3 milliGRAM(s) Oral at bedtime PRN Insomnia  ondansetron Injectable 4 milliGRAM(s) IV Push every 8 hours PRN Nausea and/or Vomiting    Vital Signs Last 24 Hrs  T(F): 100.2 (23 Dec 2022 06:53), Max: 100.2 (23 Dec 2022 06:53)  HR: 62 (23 Dec 2022 08:31) (62 - 82)  BP: 125/53 (23 Dec 2022 06:53) (113/48 - 132/66)  RR: 16 (23 Dec 2022 06:53) (16 - 18)  SpO2: 97% (23 Dec 2022 08:31) (94% - 97%)  I&O's Summary    PHYSICAL EXAM:  General: NAD, Awake and alert  ENT: Moist mucous membranes, no thrush  Neck: Supple, No JVD  Lungs: Clear to auscultation bilaterally, good air entry, non-labored breathing  Cardio: RRR, S1/S2, No murmur  Abdomen: Soft, Nontender, Nondistended; Bowel sounds present  Extremities: No calf tenderness, No pitting edema, no contractures.    LABS:                        7.7    7.68  )-----------( 239      ( 23 Dec 2022 06:05 )             25.6     12-23    139  |  106  |  26  ----------------------------<  112  3.5   |  28  |  0.88    Ca    8.7      23 Dec 2022 06:05    Culture - Blood (collected 17 Dec 2022 09:36)  Source: .Blood Blood-Peripheral  Final Report (22 Dec 2022 15:00):    No Growth Final    Culture - Blood (collected 17 Dec 2022 09:36)  Source: .Blood Blood-Peripheral  Final Report (22 Dec 2022 15:00):    No Growth Final      COVID-19 PCR: NotDetec (12-21-22 @ 11:05)  COVID-19 PCR: NotDetec (11-28-22 @ 11:43)    RADIOLOGY & ADDITIONAL TESTS:    Care Discussed with Consultants/Other Providers:    Patient is a 87y old  Female who presents with a chief complaint of Anemia (22 Dec 2022 13:44)      24 HOUR EVENTS:  Tmax of 100.2 overnight.    SUBJECTIVE:  Patient seen and examined at bedside. She is arousable, but lethargic. When she wakes up, says she is not good. Denies anything specific.   Aid at bedside says she has a poor appetite. Nursing says she has had Poor PO intake.    ALLERGIES:  lisinopril (Unknown)  penicillin (Unknown)  Pineapple (Unknown)    MEDICATIONS  (STANDING):  allopurinol 100 milliGRAM(s) Oral daily  apixaban 2.5 milliGRAM(s) Oral two times a day  ascorbic acid 500 milliGRAM(s) Oral daily  atenolol  Tablet 50 milliGRAM(s) Oral daily  budesonide 160 MICROgram(s)/formoterol 4.5 MICROgram(s) Inhaler 2 Puff(s) Inhalation two times a day  flecainide 50 milliGRAM(s) Oral every 12 hours  pantoprazole    Tablet 40 milliGRAM(s) Oral two times a day  polyethylene glycol 3350 17 Gram(s) Oral daily  senna 2 Tablet(s) Oral at bedtime  zinc sulfate 220 milliGRAM(s) Oral daily    MEDICATIONS  (PRN):  acetaminophen     Tablet .. 650 milliGRAM(s) Oral every 6 hours PRN Temp greater or equal to 38C (100.4F), Mild Pain (1 - 3)  aluminum hydroxide/magnesium hydroxide/simethicone Suspension 30 milliLiter(s) Oral every 4 hours PRN Dyspepsia  guaiFENesin Oral Liquid (Sugar-Free) 100 milliGRAM(s) Oral every 6 hours PRN Cough  melatonin 3 milliGRAM(s) Oral at bedtime PRN Insomnia  ondansetron Injectable 4 milliGRAM(s) IV Push every 8 hours PRN Nausea and/or Vomiting    Vital Signs Last 24 Hrs  T(F): 100.2 (23 Dec 2022 06:53), Max: 100.2 (23 Dec 2022 06:53)  HR: 62 (23 Dec 2022 08:31) (62 - 82)  BP: 125/53 (23 Dec 2022 06:53) (113/48 - 132/66)  RR: 16 (23 Dec 2022 06:53) (16 - 18)  SpO2: 97% (23 Dec 2022 08:31) (94% - 97%)  I&O's Summary    PHYSICAL EXAM:  General: NAD, Awake and alert  ENT: Moist mucous membranes, no thrush  Neck: Supple, No JVD  Lungs: Clear to auscultation bilaterally, good air entry, non-labored breathing  Cardio: RRR, S1/S2, No murmur  Abdomen: Soft, Nontender, Nondistended; Bowel sounds present  Extremities: No calf tenderness, No pitting edema, no contractures.    LABS:                        7.7    7.68  )-----------( 239      ( 23 Dec 2022 06:05 )             25.6     12-23    139  |  106  |  26  ----------------------------<  112  3.5   |  28  |  0.88    Ca    8.7      23 Dec 2022 06:05    Culture - Blood (collected 17 Dec 2022 09:36)  Source: .Blood Blood-Peripheral  Final Report (22 Dec 2022 15:00):    No Growth Final    Culture - Blood (collected 17 Dec 2022 09:36)  Source: .Blood Blood-Peripheral  Final Report (22 Dec 2022 15:00):    No Growth Final      COVID-19 PCR: NotDetec (12-21-22 @ 11:05)  COVID-19 PCR: NotDetec (11-28-22 @ 11:43)    RADIOLOGY & ADDITIONAL TESTS:  < from: US Duplex Venous Upper Ext Ltd, Right (12.23.22 @ 11:13) >  IMPRESSION:  Acute thrombosis of the right axillary vein and mid to distal right   brachial vein.    < end of copied text >      Care Discussed with Consultants/Other Providers: Dr. Tucker from Palliative

## 2022-12-23 NOTE — PROGRESS NOTE ADULT - CONVERSATION DETAILS
daughter, hayden, has elected to no endoscopies in setting of possible GI bleed and overall conservative management. I Discussed the conundrum of restarting the eliquis in light of not having a source of bleeding and hemoglobin drifting down. I Also discussed patient's Poor PO intake and FTT. She is agreeable to Palliative care referral to discuss end of life, goals of care, and a comfort based approach. I have consulted Dr. Tucker from Palliative care.

## 2022-12-23 NOTE — DISCHARGE NOTE NURSING/CASE MANAGEMENT/SOCIAL WORK - PATIENT PORTAL LINK FT
You can access the FollowMyHealth Patient Portal offered by [facility] by registering at the following website: [email or web address]. By joining [facility]’s FollowMyHealth portal, you will also be able to view your health information using other applications (apps) compatible with our system.

## 2022-12-23 NOTE — CONSULT NOTE ADULT - ASSESSMENT
This is an 87 year old woman with a history of Afib on Elqiuis 2.5mg BID, HTN, COPD , CHF, Dementia, patient admitted for GI bleeding, Hg 7.4 at assisted living facility.  Patient found to have a sizable  DVT in the right axilla to the brachial vein.    Patient was already on Eliquis for Afib, while not technically a treatment failure, patient was on a suboptimal dose for treating a DVT, it was supposed to be adequate to prevent one. Would transition patient to lovenox 1mg/kg BID which is a mechanism switch and also  in setting of GI bleeding, is safer given availability of protamine for reversal    If patient is bleeding rapidly can hold ovenox administer protamine 1mg/kg over 10 minutes    Transfuse to keep Hg > 7 g/dl or > 8 g/dl if patient in cardiopulmonary compromise.      Hypercoagulable state workup not indicated, provoked DVT despite prophylaxis

## 2022-12-23 NOTE — PROGRESS NOTE ADULT - CONVERSATION/DISCUSSION
Diagnosis/Prognosis/Treatment Options/Palliative Care Referral Diagnosis/Prognosis/Treatment Options/Hospice Referral/Palliative Care Referral

## 2022-12-23 NOTE — PROGRESS NOTE ADULT - ASSESSMENT
88 yo female with PMH A Fib on eliquis, AV stenosis s/p TAVR, HTN, asthma, HFpEF, pulmonary HTN, dementia, recently admitted Nov 11/28-11/30/2022 for non healing right ankle wound w/ concern for sepsis, sent by PCP due to +fecal occult and hemoglobin of 7.4 at care home. Also found to have RSV    #Normocytic anemia, possible due to GI bleed  #Hepatic lesion and pulmonary nodules seen on CT  - Fecal occult was negative on 11/28, though pt reported to be guaiac positive prior to admission  - pt is s/p 1 unit PRBCs on 12/16  - Cont PPI BID  - GI recs noted, no plan for Colonoscopy/EGD - daughter concerned about possible risks of having a procedure with underlying co-morbidities as well as would likely not act on any findings  - eliquis resumed on 12/21 after H&H remained stable -  monitor for bleeding while on eliquis at least 24 hrs. Pt has not had a BM since 12/17, bowel regimen ordered.  - Discussed with daughter today over phone permanent discontinuation of eliquis, as we have not identified source of bleeding/anemia. She is going to discuss further with her primary care doctor. Daughter says pt does NOT have a Cardiologist.     #Fevers resolved, probable PNA  - CT of chest with bilateral lower lobe opacities (12/16)  - Elevated procalc  - Blood cultures from 12/17 with NGTD  - Legionella neg  - Now on room air  - s/p levaquin x2 doses, dc'd due to qt prolongation  - Continue cefepime x24 hours     #RSV  #COPD without exacerbation   - Tolerating room air, afebrile   - Cont cough suppressant prn  - On Breo-Ellipta, symbicort while admitted and albuterol  - Continuous pulse ox    #Pre-renal azotemia  #CKD3  #Hypernatremia, dehydration - resolved  #Hypokalemia - resolved  - DC IVF  - f/u AM labs     #RLE edema  - NO DVT on ultrasound  - Was on AC as an outpatient    #Multiple skin wounds - present on admission  #Nonhealing right medial malleolar wound, unstageable   - Skin tear right forearm, right heel unstageable pressure wound, unstageable buttock wound and on right great toe.  - Skin wounds do not appear infected  - Previous wound care orders from facility  - Nutritional support w/ vitamins     #Chronic A-Fib on Eliquis  #Chronic diastolic heart failure (E 50-55% 11/29/22)  #HTN  #Bioprosthetic AV  - Echocardiogram 11/29: No effusions, EF 50>55%, mild to mod mitral valve regur  -eliquis restarted on 12/21. Pt is without a Cardiologist. I have consulted Dr. Resendiz to discuss pt's stroke risk of being OFF of AC. Given anemia without an identified source, it would be reasonable for daughter to discontinue eliquis.  - Continue home meds - flecainide and atenolol    #Gout  - Allopurinol    #Advanced Dementia  - Supportive care  - Frequent reorientation    #DVT ppx  - Had been holding eliquis for GI bleed - eliquis resumed.  - SCDs    GOC:  DNR/DNI    Dispo: Restarted on AC, monitoring for bleeding. Pending Cardio and Palliative care consults.     12/23: spoke to Sandi: Home # (preferred) [phone]. Cell # [phone].             88 yo female with PMH A Fib on eliquis, AV stenosis s/p TAVR, HTN, asthma, HFpEF, pulmonary HTN, dementia, recently admitted Nov 11/28-11/30/2022 for non healing right ankle wound w/ concern for sepsis, sent by PCP due to +fecal occult and hemoglobin of 7.4 at nursing home. Also found to have RSV    #Normocytic anemia, possible due to GI bleed  #Hepatic lesion and pulmonary nodules seen on CT  - Fecal occult was negative on 11/28, though pt reported to be guaiac positive prior to admission  - pt is s/p 1 unit PRBCs on 12/16  - Cont PPI BID  - GI recs noted, no plan for Colonoscopy/EGD - daughter concerned about possible risks of having a procedure with underlying co-morbidities as well as would likely not act on any findings  - eliquis resumed on 12/21 after H&H remained stable -  monitor for bleeding while on eliquis at least 24 hrs. Pt has not had a BM since 12/17, bowel regimen ordered.  - Discussed with daughter today over phone permanent discontinuation of eliquis, as we have not identified source of bleeding/anemia. She is going to discuss further with her primary care doctor. Daughter says pt does NOT have a Cardiologist.     #Fever, probable PNA  - CT of chest with bilateral lower lobe opacities (12/16)  - Elevated procalc  - Blood cultures from 12/17 with NGTD  - Legionella neg  - Now on room air  - s/p levaquin x2 doses, dc'd due to qt prolongation  - cefepime discontinued today, did spike a temp to 100.2 overnight. Will get monitoring blood cultures w/ next labwork. will check LUE duplex. She has no leukocytosis.    # LUE swelling  - will f/u LUE duplex.     #RSV  #COPD without exacerbation   - Tolerating room air, afebrile   - Cont cough suppressant prn  - On Breo-Ellipta, symbicort while admitted and albuterol  - Continuous pulse ox    #Pre-renal azotemia  #CKD3  #Hypernatremia, dehydration - resolved  #Hypokalemia - resolved  - DC IVF  - f/u AM labs     #RLE edema  - NO DVT on ultrasound  - Was on AC as an outpatient    #Multiple skin wounds - present on admission  #Nonhealing right medial malleolar wound, unstageable   - Skin tear right forearm, right heel unstageable pressure wound, unstageable buttock wound and on right great toe.  - Skin wounds do not appear infected  - Previous wound care orders from facility  - Nutritional support w/ vitamins     #Chronic A-Fib on Eliquis  #Chronic diastolic heart failure (E 50-55% 11/29/22)  #HTN  #Bioprosthetic AV  - Echocardiogram 11/29: No effusions, EF 50>55%, mild to mod mitral valve regur  -eliquis restarted on 12/21. Pt is without a Cardiologist. I have consulted Dr. Resendiz to discuss pt's stroke risk of being OFF of AC. Given anemia without an identified source, it would be reasonable for daughter to discontinue eliquis.  - Continue home meds - flecainide and atenolol    #Gout  - Allopurinol    #Advanced Dementia  - Supportive care  - Frequent reorientation    #DVT ppx  - Had been holding eliquis for GI bleed - eliquis resumed.  - SCDs    GOC:  DNR/DNI    Dispo: Restarted on AC, monitoring for bleeding. Pending Cardio and Palliative care consults.     12/23: spoke to Sandi: Home # (preferred) [phone]. Cell # [phone].

## 2022-12-24 PROCEDURE — 99233 SBSQ HOSP IP/OBS HIGH 50: CPT

## 2022-12-24 RX ORDER — MORPHINE SULFATE 50 MG/1
2 CAPSULE, EXTENDED RELEASE ORAL EVERY 6 HOURS
Refills: 0 | Status: DISCONTINUED | OUTPATIENT
Start: 2022-12-24 | End: 2022-12-25

## 2022-12-24 RX ORDER — MORPHINE SULFATE 50 MG/1
4 CAPSULE, EXTENDED RELEASE ORAL EVERY 6 HOURS
Refills: 0 | Status: DISCONTINUED | OUTPATIENT
Start: 2022-12-24 | End: 2022-12-25

## 2022-12-24 RX ORDER — HYDROMORPHONE HYDROCHLORIDE 2 MG/ML
0.5 INJECTION INTRAMUSCULAR; INTRAVENOUS; SUBCUTANEOUS EVERY 6 HOURS
Refills: 0 | Status: DISCONTINUED | OUTPATIENT
Start: 2022-12-24 | End: 2022-12-24

## 2022-12-24 RX ADMIN — MORPHINE SULFATE 2 MILLIGRAM(S): 50 CAPSULE, EXTENDED RELEASE ORAL at 17:55

## 2022-12-24 RX ADMIN — MORPHINE SULFATE 2 MILLIGRAM(S): 50 CAPSULE, EXTENDED RELEASE ORAL at 23:40

## 2022-12-24 RX ADMIN — SENNA PLUS 2 TABLET(S): 8.6 TABLET ORAL at 21:48

## 2022-12-24 RX ADMIN — ATENOLOL 50 MILLIGRAM(S): 25 TABLET ORAL at 05:40

## 2022-12-24 RX ADMIN — Medication 50 MILLIGRAM(S): at 08:53

## 2022-12-24 RX ADMIN — Medication 50 MILLIGRAM(S): at 17:55

## 2022-12-24 RX ADMIN — BUDESONIDE AND FORMOTEROL FUMARATE DIHYDRATE 2 PUFF(S): 160; 4.5 AEROSOL RESPIRATORY (INHALATION) at 21:59

## 2022-12-24 RX ADMIN — PANTOPRAZOLE SODIUM 40 MILLIGRAM(S): 20 TABLET, DELAYED RELEASE ORAL at 05:40

## 2022-12-24 RX ADMIN — Medication 0.5 MILLIGRAM(S): at 09:30

## 2022-12-24 RX ADMIN — ZINC SULFATE TAB 220 MG (50 MG ZINC EQUIVALENT) 220 MILLIGRAM(S): 220 (50 ZN) TAB at 13:12

## 2022-12-24 RX ADMIN — Medication 500 MILLIGRAM(S): at 13:12

## 2022-12-24 RX ADMIN — POLYETHYLENE GLYCOL 3350 17 GRAM(S): 17 POWDER, FOR SOLUTION ORAL at 13:12

## 2022-12-24 RX ADMIN — PANTOPRAZOLE SODIUM 40 MILLIGRAM(S): 20 TABLET, DELAYED RELEASE ORAL at 17:55

## 2022-12-24 RX ADMIN — ENOXAPARIN SODIUM 40 MILLIGRAM(S): 100 INJECTION SUBCUTANEOUS at 05:42

## 2022-12-24 RX ADMIN — MORPHINE SULFATE 2 MILLIGRAM(S): 50 CAPSULE, EXTENDED RELEASE ORAL at 18:51

## 2022-12-24 RX ADMIN — Medication 100 MILLIGRAM(S): at 13:12

## 2022-12-24 RX ADMIN — MORPHINE SULFATE 3 MILLIGRAM(S): 50 CAPSULE, EXTENDED RELEASE ORAL at 10:24

## 2022-12-24 RX ADMIN — BUDESONIDE AND FORMOTEROL FUMARATE DIHYDRATE 2 PUFF(S): 160; 4.5 AEROSOL RESPIRATORY (INHALATION) at 09:40

## 2022-12-24 RX ADMIN — MORPHINE SULFATE 3 MILLIGRAM(S): 50 CAPSULE, EXTENDED RELEASE ORAL at 10:15

## 2022-12-24 NOTE — PROGRESS NOTE ADULT - SUBJECTIVE AND OBJECTIVE BOX
Patient is a 87y old  Female who presents with a chief complaint of Anemia (23 Dec 2022 20:05)      24 HOUR EVENTS:  Pt made CMO.     SUBJECTIVE:  Pt laying in bed comfortably. no acute complaints. provided update to daughter at bedside.    ALLERGIES:  lisinopril (Unknown)  penicillin (Unknown)  Pineapple (Unknown)    MEDICATIONS  (STANDING):  allopurinol 100 milliGRAM(s) Oral daily  atenolol  Tablet 50 milliGRAM(s) Oral daily  budesonide 160 MICROgram(s)/formoterol 4.5 MICROgram(s) Inhaler 2 Puff(s) Inhalation two times a day  enoxaparin Injectable 40 milliGRAM(s) SubCutaneous every 24 hours  flecainide 50 milliGRAM(s) Oral every 12 hours  morphine  - Injectable 2 milliGRAM(s) IV Push every 6 hours  pantoprazole    Tablet 40 milliGRAM(s) Oral two times a day  polyethylene glycol 3350 17 Gram(s) Oral daily  senna 2 Tablet(s) Oral at bedtime    MEDICATIONS  (PRN):  aluminum hydroxide/magnesium hydroxide/simethicone Suspension 30 milliLiter(s) Oral every 4 hours PRN Dyspepsia  guaiFENesin Oral Liquid (Sugar-Free) 100 milliGRAM(s) Oral every 6 hours PRN Cough  LORazepam   Injectable 0.5 milliGRAM(s) IV Push every 4 hours PRN Anxiety  melatonin 3 milliGRAM(s) Oral at bedtime PRN Insomnia  morphine  - Injectable 4 milliGRAM(s) IV Push every 6 hours PRN Severe Pain (7 - 10)  morphine  - Injectable 3 milliGRAM(s) IV Push every 3 hours PRN Moderate Pain (4 - 6)  ondansetron Injectable 4 milliGRAM(s) IV Push every 8 hours PRN Nausea and/or Vomiting    Vital Signs Last 24 Hrs  T(F): 97.6 (24 Dec 2022 04:18), Max: 98.3 (23 Dec 2022 15:38)  HR: 62 (24 Dec 2022 09:40) (62 - 109)  BP: 170/60 (24 Dec 2022 04:18) (145/46 - 170/60)  RR: 16 (24 Dec 2022 04:18) (16 - 16)  SpO2: 91% (24 Dec 2022 09:40) (91% - 98%)  I&O's Summary    PHYSICAL EXAM:  General: NAD, lethargic but arousable.  ENT: Moist mucous membranes, no thrush  Neck: Supple, No JVD  Lungs: Clear to auscultation bilaterally, good air entry, non-labored breathing  Cardio: irregularly irregular S1/S2, No murmur  Abdomen: Soft, Nontender, Nondistended; Bowel sounds present  Extremities: No calf tenderness, LUE swelling/bruising.     LABS:                        7.7    7.68  )-----------( 239      ( 23 Dec 2022 06:05 )             25.6     12-23    139  |  106  |  26  ----------------------------<  112  3.5   |  28  |  0.88    Ca    8.7      23 Dec 2022 06:05          COVID-19 PCR: NotDetec (12-21-22 @ 11:05)  COVID-19 PCR: NotDetec (11-28-22 @ 11:43)    RADIOLOGY & ADDITIONAL TESTS:    < from: US Duplex Venous Upper Ext Ltd, Right (12.23.22 @ 11:13) >  IMPRESSION:  Acute thrombosis of the right axillary vein and mid to distal right   brachial vein.    < end of copied text >      Care Discussed with Consultants/Other Providers:

## 2022-12-24 NOTE — DISCHARGE NOTE PROVIDER - NSDCMRMEDTOKEN_GEN_ALL_CORE_FT
acetaminophen 325 mg oral tablet: 2 tab(s) orally every 6 hours, As needed, Temp greater or equal to 38C (100.4F), Mild Pain (1 - 3)  allopurinol 100 mg oral tablet: 1  orally once a day  ascorbic acid 500 mg oral tablet: 1 tab(s) orally once a day  atenolol 50 mg oral tablet: 1 tab(s) orally once a day  Breo Ellipta 100 mcg-25 mcg/inh inhalation powder: 1 puff(s) inhaled once a day  Calcium 600+D 600 mg-5 mcg (200 intl units) oral tablet: 1 tab(s) orally 2 times a day  Colace 100 mg oral capsule: 1 cap(s) orally once a day  Eliquis 2.5 mg oral tablet: 1 tab(s) orally 2 times a day  flecainide 50 mg oral tablet: 1 tab(s) orally every 12 hours  MiraLax oral powder for reconstitution:   potassium chloride 20 mEq oral tablet, extended release: 1 tab(s) orally 2 times a day  zinc sulfate 220 mg oral capsule: 1 cap(s) orally 3 times a day   acetaminophen 325 mg oral tablet: 2 tab(s) orally every 6 hours, As needed, Temp greater or equal to 38C (100.4F), Mild Pain (1 - 3)  allopurinol 100 mg oral tablet: 1  orally once a day  aluminum hydroxide-magnesium hydroxide 200 mg-200 mg/5 mL oral suspension: 30 milliliter(s) orally every 4 hours, As needed, Dyspepsia  atenolol 50 mg oral tablet: 1 tab(s) orally once a day  Breo Ellipta 100 mcg-25 mcg/inh inhalation powder: 1 puff(s) inhaled once a day  Colace 100 mg oral capsule: 1 cap(s) orally once a day  Eliquis 2.5 mg oral tablet: 1 tab(s) orally 2 times a day  flecainide 50 mg oral tablet: 1 tab(s) orally every 12 hours  guaiFENesin 100 mg/5 mL oral liquid: 5 milliliter(s) orally every 6 hours, As needed, Cough  LORazepam: 0.5 milligram(s) intravenous every 2 to 4 hours, for anxiety  MiraLax oral powder for reconstitution:   morphine: 3 milligram(s) intravenous every 3 to 4 hours, As Needed for pain  pantoprazole 40 mg oral delayed release tablet: 1 tab(s) orally 2 times a day  senna leaf extract oral tablet: 2 tab(s) orally once a day (at bedtime)

## 2022-12-24 NOTE — PROGRESS NOTE ADULT - ASSESSMENT
86 yo female with PMH A Fib on eliquis, AV stenosis s/p TAVR, HTN, asthma, HFpEF, pulmonary HTN, dementia, recently admitted Nov 11/28-11/30/2022 for non healing right ankle wound w/ concern for sepsis, sent by PCP due to +fecal occult and hemoglobin of 7.4 at nursing home. Also found to have RSV.  In terms of working up anemia, GI was consulted and recommended upper and lower endoscopies. Family has elected to conservative management and no aggressive/invasive work up due to pt's advanced dementia and co-morbidities. When  H/H was stable, eliquis was restarted.  Because of persistent downtrending of h/h after restarting AC, goals of care discussions were initiated, and it was decided not to continue AC. Pt has had poor PO intake and is FTT. She was found to have a large clot in axillary vein. Hematology consulted and recommended HD AC. Family decided not to treat DVT, and to focus on comfort and end of life.  Patient made CMO. For pain control, she was put on IV morphine. Plan will be to [facility] once there is a bed available. Doc-to-doc performed on 12/24.  Pt had persistent fevers while admitted, found to have b/l lower lobe opacities on CT chest (12/16), completed a course of abx w/ cefepime.    #Normocytic anemia, possible due to GI bleed  #Hepatic lesion and pulmonary nodules seen on CT  - Fecal occult was negative on 11/28, though pt reported to be guaiac positive prior to admission  - pt is s/p 1 unit PRBCs on 12/16  - Cont PPI BID  - GI recs noted, no plan for Colonoscopy/EGD - family has elected for conservative management  - CMO    # LUE DVT  Acute thrombosis of the right axillary vein and mid to distal right brachial vein seen on LUE duplex.  - family has elected not to treat, CMO    #Fever, probable PNA - resolved  - CT of chest with bilateral lower lobe opacities (12/16) w/ elevated procalcitonin. Blood cultures from 12/17 with NGTD. Legionella neg. s/p levaquin x2 doses, dc'd due to qt prolongation. Completed course of abx w/ cefepime.  - Now on room air    #RSV  #COPD without exacerbation   - Tolerating room air, afebrile   - Cont cough suppressant prn  - On Breo-Ellipta, symbicort while admitted and albuterol  - Continuous pulse ox    #Pre-renal azotemia  #CKD3  #Hypernatremia, dehydration - resolved  #Hypokalemia - resolved  - DC IVF    #Multiple skin wounds - present on admission  #Nonhealing right medial malleolar wound, unstageable   - Skin tear right forearm, right heel unstageable pressure wound, unstageable buttock wound and on right great toe.  - Skin wounds do not appear infected  - Previous wound care orders from facility  - Nutritional support w/ vitamins     #Chronic A-Fib on Eliquis  #Chronic diastolic heart failure (E 50-55% 11/29/22)  #HTN  #Bioprosthetic AV  - Echocardiogram 11/29: No effusions, EF 50>55%, mild to mod mitral valve regur  -hold AC d/t indeterminate source of bleed and CMO  - Continue home meds - flecainide and atenolol    #Gout  - Allopurinol    #Advanced Dementia  - Supportive care    GOC:  DNR/DNI    [facility] when [facility] is a bed available    12/24 hayden updated at BS

## 2022-12-24 NOTE — PROGRESS NOTE ADULT - TIME BILLING
Doc-to-doc for hosp[facility]. discussing clinical status with daughter. adjusting pt's pain medications/anti anxiety.

## 2022-12-24 NOTE — DISCHARGE NOTE PROVIDER - NSDCCPCAREPLAN_GEN_ALL_CORE_FT
PRINCIPAL DISCHARGE DIAGNOSIS  Diagnosis: Anemia  Assessment and Plan of Treatment: pt transfused.  For questions and concerns, call Regina De Paz, discharging [phone]      SECONDARY DISCHARGE DIAGNOSES  Diagnosis: RSV infection  Assessment and Plan of Treatment:

## 2022-12-24 NOTE — DISCHARGE NOTE PROVIDER - HOSPITAL COURSE
88 yo female with PMH A Fib on eliquis, AV stenosis s/p TAVR, HTN, asthma, HFpEF, pulmonary HTN, dementia, recently admitted Nov 11/28-11/30/2022 for non healing right ankle wound w/ concern for sepsis, sent by PCP due to +fecal occult and hemoglobin of 7.4 at [facility]. Also found to have RSV.  In terms of working up anemia, GI was consulted and recommended upper and lower endoscopies. Family has elected to conservative management and no aggressive/invasive work up due to pt's advanced dementia and co-morbidities. When  H/H was stable, eliquis was restarted.  Because of persistent downtrending of h/h after restarting AC, goals of care discussions were initiated, and it was decided not to continue AC. Pt has had poor PO intake and is FTT. She was found to have a large clot in axillary vein. Hematology consulted and recommended HD AC. Family decided not to treat DVT, and to focus on comfort and end of life.  Patient made CMO. For pain control, she was put on IV morphine. Plan will be to [facility] once there is a bed available. Doc-to-doc performed on 12/24.  Pt had persistent fevers while admitted, found to have b/l lower lobe opacities on CT chest (12/16), completed a course of abx w/ cefepime. 86 yo female with PMH A Fib on eliquis, AV stenosis s/p TAVR, HTN, asthma, HFpEF, pulmonary HTN, dementia, recently admitted Nov 11/28-11/30/2022 for non healing right ankle wound w/ concern for sepsis, sent by PCP due to +fecal occult and hemoglobin of 7.4 at [facility]. Also found to have RSV.  Pt had persistent fevers while admitted, found to have b/l lower lobe opacities on CT chest (12/16), completed a course of abx w/ cefepime.  In terms of working up anemia, GI was consulted and recommended upper and lower endoscopies. Family has elected to conservative management and no aggressive/invasive work up due to pt's advanced dementia and co-morbidities. When  H/H was stable, eliquis was restarted.  Because of persistent downtrending of h/h after restarting AC, goals of care discussions were initiated, and it was decided not to continue AC. Pt has had poor PO intake and is FTT. She was found to have a large clot in axillary vein. Hematology consulted and recommended HD AC. Family decided not to treat DVT, did not want blood thinners, and to focus on comfort and end of life. Main goal is to treat pain. Patient made CMO. For pain control, she was put on IV morphine. Plan will be to transfer to hospice [facility].  Doc to Doc performed with Jhon, sign out given.

## 2022-12-25 VITALS — OXYGEN SATURATION: 98 %

## 2022-12-25 PROCEDURE — U0003: CPT

## 2022-12-25 PROCEDURE — 87040 BLOOD CULTURE FOR BACTERIA: CPT

## 2022-12-25 PROCEDURE — 80048 BASIC METABOLIC PNL TOTAL CA: CPT

## 2022-12-25 PROCEDURE — 36415 COLL VENOUS BLD VENIPUNCTURE: CPT

## 2022-12-25 PROCEDURE — 84145 PROCALCITONIN (PCT): CPT

## 2022-12-25 PROCEDURE — 86850 RBC ANTIBODY SCREEN: CPT

## 2022-12-25 PROCEDURE — 87637 SARSCOV2&INF A&B&RSV AMP PRB: CPT

## 2022-12-25 PROCEDURE — 85025 COMPLETE CBC W/AUTO DIFF WBC: CPT

## 2022-12-25 PROCEDURE — 86900 BLOOD TYPING SEROLOGIC ABO: CPT

## 2022-12-25 PROCEDURE — 82272 OCCULT BLD FECES 1-3 TESTS: CPT

## 2022-12-25 PROCEDURE — 93970 EXTREMITY STUDY: CPT

## 2022-12-25 PROCEDURE — 96374 THER/PROPH/DIAG INJ IV PUSH: CPT

## 2022-12-25 PROCEDURE — 85027 COMPLETE CBC AUTOMATED: CPT

## 2022-12-25 PROCEDURE — 94640 AIRWAY INHALATION TREATMENT: CPT

## 2022-12-25 PROCEDURE — 93971 EXTREMITY STUDY: CPT

## 2022-12-25 PROCEDURE — 86923 COMPATIBILITY TEST ELECTRIC: CPT

## 2022-12-25 PROCEDURE — 74177 CT ABD & PELVIS W/CONTRAST: CPT

## 2022-12-25 PROCEDURE — 83605 ASSAY OF LACTIC ACID: CPT

## 2022-12-25 PROCEDURE — 71260 CT THORAX DX C+: CPT

## 2022-12-25 PROCEDURE — 36430 TRANSFUSION BLD/BLD COMPNT: CPT

## 2022-12-25 PROCEDURE — 93005 ELECTROCARDIOGRAM TRACING: CPT

## 2022-12-25 PROCEDURE — U0005: CPT

## 2022-12-25 PROCEDURE — 83735 ASSAY OF MAGNESIUM: CPT

## 2022-12-25 PROCEDURE — 99285 EMERGENCY DEPT VISIT HI MDM: CPT

## 2022-12-25 PROCEDURE — 74176 CT ABD & PELVIS W/O CONTRAST: CPT

## 2022-12-25 PROCEDURE — 85014 HEMATOCRIT: CPT

## 2022-12-25 PROCEDURE — 71045 X-RAY EXAM CHEST 1 VIEW: CPT

## 2022-12-25 PROCEDURE — 94760 N-INVAS EAR/PLS OXIMETRY 1: CPT

## 2022-12-25 PROCEDURE — 99239 HOSP IP/OBS DSCHRG MGMT >30: CPT

## 2022-12-25 PROCEDURE — 87449 NOS EACH ORGANISM AG IA: CPT

## 2022-12-25 PROCEDURE — 87899 AGENT NOS ASSAY W/OPTIC: CPT

## 2022-12-25 PROCEDURE — P9016: CPT

## 2022-12-25 PROCEDURE — 80053 COMPREHEN METABOLIC PANEL: CPT

## 2022-12-25 PROCEDURE — 86901 BLOOD TYPING SEROLOGIC RH(D): CPT

## 2022-12-25 PROCEDURE — 97162 PT EVAL MOD COMPLEX 30 MIN: CPT

## 2022-12-25 PROCEDURE — 85018 HEMOGLOBIN: CPT

## 2022-12-25 RX ORDER — PANTOPRAZOLE SODIUM 20 MG/1
1 TABLET, DELAYED RELEASE ORAL
Qty: 0 | Refills: 0 | DISCHARGE
Start: 2022-12-25

## 2022-12-25 RX ORDER — ASCORBIC ACID 60 MG
1 TABLET,CHEWABLE ORAL
Qty: 0 | Refills: 0 | DISCHARGE

## 2022-12-25 RX ORDER — MORPHINE SULFATE 50 MG/1
3 CAPSULE, EXTENDED RELEASE ORAL
Qty: 0 | Refills: 0 | DISCHARGE
Start: 2022-12-25

## 2022-12-25 RX ORDER — ZINC SULFATE TAB 220 MG (50 MG ZINC EQUIVALENT) 220 (50 ZN) MG
1 TAB ORAL
Qty: 0 | Refills: 0 | DISCHARGE

## 2022-12-25 RX ORDER — SENNA PLUS 8.6 MG/1
2 TABLET ORAL
Qty: 0 | Refills: 0 | DISCHARGE
Start: 2022-12-25

## 2022-12-25 RX ORDER — POTASSIUM CHLORIDE 20 MEQ
1 PACKET (EA) ORAL
Qty: 0 | Refills: 0 | DISCHARGE

## 2022-12-25 RX ADMIN — MORPHINE SULFATE 2 MILLIGRAM(S): 50 CAPSULE, EXTENDED RELEASE ORAL at 05:47

## 2022-12-25 RX ADMIN — PANTOPRAZOLE SODIUM 40 MILLIGRAM(S): 20 TABLET, DELAYED RELEASE ORAL at 05:27

## 2022-12-25 RX ADMIN — ATENOLOL 50 MILLIGRAM(S): 25 TABLET ORAL at 05:28

## 2022-12-25 RX ADMIN — ENOXAPARIN SODIUM 40 MILLIGRAM(S): 100 INJECTION SUBCUTANEOUS at 05:30

## 2022-12-25 RX ADMIN — MORPHINE SULFATE 2 MILLIGRAM(S): 50 CAPSULE, EXTENDED RELEASE ORAL at 12:02

## 2022-12-25 RX ADMIN — BUDESONIDE AND FORMOTEROL FUMARATE DIHYDRATE 2 PUFF(S): 160; 4.5 AEROSOL RESPIRATORY (INHALATION) at 10:04

## 2022-12-25 RX ADMIN — Medication 50 MILLIGRAM(S): at 05:28

## 2022-12-25 RX ADMIN — MORPHINE SULFATE 2 MILLIGRAM(S): 50 CAPSULE, EXTENDED RELEASE ORAL at 00:10

## 2022-12-25 RX ADMIN — MORPHINE SULFATE 2 MILLIGRAM(S): 50 CAPSULE, EXTENDED RELEASE ORAL at 05:32

## 2022-12-25 RX ADMIN — Medication 0.5 MILLIGRAM(S): at 12:02

## 2022-12-25 NOTE — PROGRESS NOTE ADULT - ASSESSMENT
88 yo female with PMH A Fib on eliquis, AV stenosis s/p TAVR, HTN, asthma, HFpEF, pulmonary HTN, dementia, recently admitted Nov 11/28-11/30/2022 for non healing right ankle wound w/ concern for sepsis, [facility] Also found to have RSV.  In terms of working up anemia, GI was consulted and recommended upper and lower endoscopies. Family has elected to conservative management and no aggressive/invasive work up due to pt's advanced dementia and co-morbidities. When  H/H was stable, eliquis was restarted.  Because of persistent downtrending of h/h after restarting AC, goals of care discussions were initiated, and it was decided not to continue AC. Pt has had poor PO intake and is FTT. She was found to have a large clot in axillary vein. Hematology consulted and recommended HD AC. Family decided not to treat DVT, and to focus on comfort and end of life.  Patient made CMO. For pain control, she was put on IV morphine. Plan will be to transfer to [facility] once there is a bed available. Doc-to-doc performed on 12/24.  Pt had persistent fevers while admitted, found to have b/l lower lobe opacities on CT chest (12/16), completed a course of abx w/ cefepime.    #Normocytic anemia, possible due to GI bleed  #Hepatic lesion and pulmonary nodules seen on CT  - Fecal occult was negative on 11/28, though pt reported to be guaiac positive prior to admission  - pt is s/p 1 unit PRBCs on 12/16  - Cont PPI BID  - GI recs noted, no plan for Colonoscopy/EGD - family has elected for conservative management  - CMO    # LUE DVT  Acute thrombosis of the right axillary vein and mid to distal right brachial vein seen on LUE duplex.  - family has elected not to treat, CMO    #Fever, probable PNA - resolved  - CT of chest with bilateral lower lobe opacities (12/16) w/ elevated procalcitonin. Blood cultures from 12/17 with NGTD. Legionella neg. s/p levaquin x2 doses, dc'd due to qt prolongation. Completed course of abx w/ cefepime.  - Now on room air    #RSV  #COPD without exacerbation   - Tolerating room air, afebrile   - Cont cough suppressant prn  - On Breo-Ellipta, symbicort while admitted and albuterol  - Continuous pulse ox    #Pre-renal azotemia  #CKD3  #Hypernatremia, dehydration - resolved  #Hypokalemia - resolved  - DC IVF    #Multiple skin wounds - present on admission  #Nonhealing right medial malleolar wound, unstageable   - Skin tear right forearm, right heel unstageable pressure wound, unstageable buttock wound and on right great toe.  - Skin wounds do not appear infected  - Previous wound care orders from facility  - Nutritional support w/ vitamins     #Chronic A-Fib on Eliquis  #Chronic diastolic heart failure (E 50-55% 11/29/22)  #HTN  #Bioprosthetic AV  - Echocardiogram 11/29: No effusions, EF 50>55%, mild to mod mitral valve regur  -hold AC d/t indeterminate source of bleed and CMO  - Continue home meds - flecainide and atenolol    #Gout  - Allopurinol    #Advanced Dementia  - Supportive care    GOC:  DNR/DNI    [facility] is a [facility]    12/24 hayden updated at BS             88 yo female with PMH A Fib on eliquis, AV stenosis s/p TAVR, HTN, asthma, HFpEF, pulmonary HTN, dementia, recently admitted Nov 11/28-11/30/2022 for non healing right ankle wound w/ concern for sepsis, sent by PCP due to +fecal occult and hemoglobin of 7.4 at [facility]. Also found to have RSV.  In terms of working up anemia, GI was consulted and recommended upper and lower endoscopies. Family has elected to conservative management and no aggressive/invasive work up due to pt's advanced dementia and co-morbidities. When  H/H was stable, eliquis was restarted.  Because of persistent downtrending of h/h after restarting AC, goals of care discussions were initiated, and it was decided not to continue AC. Pt has had poor PO intake and is FTT. She was found to have a large clot in axillary vein. Hematology consulted and recommended HD AC. Family decided not to treat DVT, and to focus on comfort and end of life.  Patient made CMO. For pain control, she was put on IV morphine. Plan will be to [facility] to [facility] once there is a bed available. Doc-to-doc performed on 12/24.  Pt had persistent fevers while admitted, found to have b/l lower lobe opacities on CT chest (12/16), completed a course of abx w/ cefepime.    #Normocytic anemia, possible due to GI bleed  #Hepatic lesion and pulmonary nodules seen on CT  - Fecal occult was negative on 11/28, though pt reported to be guaiac positive prior to admission  - pt is s/p 1 unit PRBCs on 12/16  - Cont PPI BID  - GI recs noted, no plan for Colonoscopy/EGD - family has elected for conservative management  - CMO    # LUE DVT  Acute thrombosis of the right axillary vein and mid to distal right brachial vein seen on LUE duplex.  - family has elected not to treat, no blood thinners wanted    #Fever, probable PNA - resolved  - CT of chest with bilateral lower lobe opacities (12/16) w/ elevated procalcitonin. Blood cultures from 12/17 with NGTD. Legionella neg. s/p levaquin x2 doses, dc'd due to qt prolongation. Completed course of abx w/ cefepime.      #RSV  #COPD without exacerbation   - Cont cough suppressant prn  - On Breo-Ellipta, symbicort while admitted and albuterol    #Pre-renal azotemia  #CKD3  #Hypernatremia, dehydration - resolved  #Hypokalemia - resolved  - DC IVF    #Multiple skin wounds - present on admission  #Nonhealing right medial malleolar wound, unstageable   - Skin tear right forearm, right heel unstageable pressure wound, unstageable buttock wound and on right great toe.  - Skin wounds do not appear infected  - Previous wound care orders from facility  - Nutritional support w/ vitamins     #Chronic A-Fib on Eliquis  #Chronic diastolic heart failure (E 50-55% 11/29/22)  #HTN  #Bioprosthetic AV  - Echocardiogram 11/29: No effusions, EF 50>55%, mild to mod mitral valve regur  -hold AC d/t indeterminate source of bleed and CMO  - Continue home meds - flecainide and atenolol, can d/c while on hospice.    #Gout  - Allopurinol    #Advanced Dementia  - Supportive care    GOC:  DNR/DNI    [facility] is [facility] available     hayden (daughter) updated.

## 2022-12-25 NOTE — PROGRESS NOTE ADULT - PROVIDER SPECIALTY LIST ADULT
Hospitalist
Hospitalist
Gastroenterology
Hospitalist
Gastroenterology
Hospitalist

## 2022-12-25 NOTE — PROGRESS NOTE ADULT - NUTRITIONAL ASSESSMENT
This patient has been assessed with a concern for Malnutrition and has been determined to have a diagnosis/diagnoses of Severe protein-calorie malnutrition.    This patient is being managed with:   Diet Regular-  Supplement Feeding Modality:  Oral  Ensure Plus High Protein Cans or Servings Per Day:  1       Frequency:  Three Times a day  Entered: Dec 21 2022  9:57AM    
This patient has been assessed with a concern for Malnutrition and has been determined to have a diagnosis/diagnoses of Severe protein-calorie malnutrition.    This patient is being managed with:   Diet Clear Liquid-  Supplement Feeding Modality:  Oral  Ensure Plus High Protein Cans or Servings Per Day:  1       Frequency:  Three Times a day  Entered: Dec 23 2022  2:17PM    
This patient has been assessed with a concern for Malnutrition and has been determined to have a diagnosis/diagnoses of Severe protein-calorie malnutrition.    This patient is being managed with:   Diet Clear Liquid-  Supplement Feeding Modality:  Oral  Ensure Plus High Protein Cans or Servings Per Day:  1       Frequency:  Three Times a day  Entered: Dec 23 2022  2:17PM    
This patient has been assessed with a concern for Malnutrition and has been determined to have a diagnosis/diagnoses of Severe protein-calorie malnutrition.    This patient is being managed with:   Diet Regular-  Supplement Feeding Modality:  Oral  Ensure Plus High Protein Cans or Servings Per Day:  1       Frequency:  Three Times a day  Entered: Dec 21 2022  9:57AM

## 2022-12-25 NOTE — PROGRESS NOTE ADULT - SUBJECTIVE AND OBJECTIVE BOX
Patient is a 87y old  Female who presents with a chief complaint of Anemia (24 Dec 2022 14:42)      24 HOUR EVENTS:  No overnight events reported.     SUBJECTIVE:  Patient seen and examined at bedside.     ALLERGIES:  lisinopril (Unknown)  penicillin (Unknown)  Pineapple (Unknown)    MEDICATIONS  (STANDING):  allopurinol 100 milliGRAM(s) Oral daily  atenolol  Tablet 50 milliGRAM(s) Oral daily  budesonide 160 MICROgram(s)/formoterol 4.5 MICROgram(s) Inhaler 2 Puff(s) Inhalation two times a day  enoxaparin Injectable 40 milliGRAM(s) SubCutaneous every 24 hours  flecainide 50 milliGRAM(s) Oral every 12 hours  morphine  - Injectable 2 milliGRAM(s) IV Push every 6 hours  pantoprazole    Tablet 40 milliGRAM(s) Oral two times a day  polyethylene glycol 3350 17 Gram(s) Oral daily  senna 2 Tablet(s) Oral at bedtime    MEDICATIONS  (PRN):  aluminum hydroxide/magnesium hydroxide/simethicone Suspension 30 milliLiter(s) Oral every 4 hours PRN Dyspepsia  guaiFENesin Oral Liquid (Sugar-Free) 100 milliGRAM(s) Oral every 6 hours PRN Cough  LORazepam   Injectable 0.5 milliGRAM(s) IV Push every 4 hours PRN Anxiety  melatonin 3 milliGRAM(s) Oral at bedtime PRN Insomnia  morphine  - Injectable 3 milliGRAM(s) IV Push every 3 hours PRN Moderate Pain (4 - 6)  morphine  - Injectable 4 milliGRAM(s) IV Push every 6 hours PRN Severe Pain (7 - 10)  ondansetron Injectable 4 milliGRAM(s) IV Push every 8 hours PRN Nausea and/or Vomiting    Vital Signs Last 24 Hrs  T(F): 97.2 (25 Dec 2022 05:02), Max: 98.4 (24 Dec 2022 21:46)  HR: 83 (25 Dec 2022 05:02) (62 - 83)  BP: 156/64 (25 Dec 2022 05:02) (148/68 - 156/64)  RR: 18 (25 Dec 2022 05:02) (18 - 18)  SpO2: 98% (25 Dec 2022 05:02) (91% - 98%)  I&O's Summary    PHYSICAL EXAM:  General: NAD, l;ethargic, arousable  ENT: Moist mucous membranes, no thrush  Neck: Supple, No JVD  Lungs: Clear to auscultation bilaterally, good air entry, non-labored breathing  Cardio: irregularly irregular, murmur, not cyanotic  Abdomen: Soft, Nontender, Nondistended; Bowel sounds present  Extremities: No calf tenderness, No pitting edema, no contractures.    LABS:                        7.7    7.68  )-----------( 239      ( 23 Dec 2022 06:05 )             25.6     12-23    139  |  106  |  26  ----------------------------<  112  3.5   |  28  |  0.88    Ca    8.7      23 Dec 2022 06:05        COVID-19 PCR: NotDetec (12-21-22 @ 11:05)  COVID-19 PCR: NotDetec (11-28-22 @ 11:43)    RADIOLOGY & ADDITIONAL TESTS:    Care Discussed with Consultants/Other Providers:    Patient is a 87y old  Female who presents with a chief complaint of Anemia (24 Dec 2022 14:42)      24 HOUR EVENTS:  No overnight events reported.     SUBJECTIVE:  Patient seen and examined at bedside. no complaints. laying comfortably.     ALLERGIES:  lisinopril (Unknown)  penicillin (Unknown)  Pineapple (Unknown)    MEDICATIONS  (STANDING):  allopurinol 100 milliGRAM(s) Oral daily  atenolol  Tablet 50 milliGRAM(s) Oral daily  budesonide 160 MICROgram(s)/formoterol 4.5 MICROgram(s) Inhaler 2 Puff(s) Inhalation two times a day  enoxaparin Injectable 40 milliGRAM(s) SubCutaneous every 24 hours  flecainide 50 milliGRAM(s) Oral every 12 hours  morphine  - Injectable 2 milliGRAM(s) IV Push every 6 hours  pantoprazole    Tablet 40 milliGRAM(s) Oral two times a day  polyethylene glycol 3350 17 Gram(s) Oral daily  senna 2 Tablet(s) Oral at bedtime    MEDICATIONS  (PRN):  aluminum hydroxide/magnesium hydroxide/simethicone Suspension 30 milliLiter(s) Oral every 4 hours PRN Dyspepsia  guaiFENesin Oral Liquid (Sugar-Free) 100 milliGRAM(s) Oral every 6 hours PRN Cough  LORazepam   Injectable 0.5 milliGRAM(s) IV Push every 4 hours PRN Anxiety  melatonin 3 milliGRAM(s) Oral at bedtime PRN Insomnia  morphine  - Injectable 3 milliGRAM(s) IV Push every 3 hours PRN Moderate Pain (4 - 6)  morphine  - Injectable 4 milliGRAM(s) IV Push every 6 hours PRN Severe Pain (7 - 10)  ondansetron Injectable 4 milliGRAM(s) IV Push every 8 hours PRN Nausea and/or Vomiting    Vital Signs Last 24 Hrs  T(F): 97.2 (25 Dec 2022 05:02), Max: 98.4 (24 Dec 2022 21:46)  HR: 83 (25 Dec 2022 05:02) (62 - 83)  BP: 156/64 (25 Dec 2022 05:02) (148/68 - 156/64)  RR: 18 (25 Dec 2022 05:02) (18 - 18)  SpO2: 98% (25 Dec 2022 05:02) (91% - 98%)  I&O's Summary    PHYSICAL EXAM:  General: NAD, lethargic, arousable  ENT: Moist mucous membranes, no thrush  Neck: Supple, No JVD  Lungs: Clear to auscultation bilaterally, good air entry, non-labored breathing  Cardio: irregularly irregular, murmur, not cyanotic  Abdomen: Soft, Nontender, Nondistended; Bowel sounds present  Extremities: No calf tenderness, No pitting edema, edema in LUE,no contractures.    LABS:                        7.7    7.68  )-----------( 239      ( 23 Dec 2022 06:05 )             25.6     12-23    139  |  106  |  26  ----------------------------<  112  3.5   |  28  |  0.88    Ca    8.7      23 Dec 2022 06:05        COVID-19 PCR: NotDetec (12-21-22 @ 11:05)  COVID-19 PCR: NotDetec (11-28-22 @ 11:43)    RADIOLOGY & ADDITIONAL TESTS:    Care Discussed with Consultants/Other Providers:

## 2023-09-11 NOTE — CHART NOTE - NSCHARTNOTEFT_GEN_A_CORE
88 yo female with PMH A Fib was on eliquis, HTN, COPD, HFpEF, advanced dementia. GI following patient for anemia and + FOB. Family refusing endoscopic intervention at this time. Given current respiratory status, continue conservative management. Will sign off, please reconsult PRN.
Nutrition Follow Up Note  Hospital Course   (Per Electronic Medical Record)    Source:  Patient [X]  Nursing Staff [X]   Medical Record [X]      Diet: Diet, Regular:   Supplement Feeding Modality:  Oral  Ensure Plus High Protein Cans or Servings Per Day:  1       Frequency:  Two Times a day (12-19-22 @ 08:27) [Active]      Nutrition Follow Up: Patient confused; unable to obtain information from patient at this time. Patient noted with poor PO intakes. Per pt's aid present at beside, pt has variable PO intakes but consumes the ensure supplement. Recommend increasing ensure supplement frequency TID (Ensure Plus High Protein 8oz PO TID Provides 1,050kcal & 60grams of Protein). Patient will also receive additional items on food tray as discussed with pt's aid to optimize PO intake.     Enteral/Parenteral Nutrition: Not Applicable    START [id number]    139  |  107  |  24<H>  ----------------------------<  110<H>  4.3   |  24  |  0.81    Ca    8.8      21 Dec 2022 08:48  Mg     1.6     12-20    END CHEMFISH  START MEDSACTIVEMEDICATIONS  (STANDING):  allopurinol 100 milliGRAM(s) Oral daily  ascorbic acid 500 milliGRAM(s) Oral daily  atenolol  Tablet 50 milliGRAM(s) Oral daily  budesonide 160 MICROgram(s)/formoterol 4.5 MICROgram(s) Inhaler 2 Puff(s) Inhalation two times a day  cefepime   IVPB      cefepime   IVPB 2000 milliGRAM(s) IV Intermittent every 8 hours  flecainide 50 milliGRAM(s) Oral every 12 hours  pantoprazole    Tablet 40 milliGRAM(s) Oral two times a day  zinc sulfate 220 milliGRAM(s) Oral daily    MEDICATIONS  (PRN):  acetaminophen     Tablet .. 650 milliGRAM(s) Oral every 6 hours PRN Temp greater or equal to 38C (100.4F), Mild Pain (1 - 3)  aluminum hydroxide/magnesium hydroxide/simethicone Suspension 30 milliLiter(s) Oral every 4 hours PRN Dyspepsia  guaiFENesin Oral Liquid (Sugar-Free) 100 milliGRAM(s) Oral every 6 hours PRN Cough  melatonin 3 milliGRAM(s) Oral at bedtime PRN Insomnia  ondansetron Injectable 4 milliGRAM(s) IV Push every 8 hours PRN Nausea and/or Vomiting  END MEDSACTIVE  START DIETORDEREND DIETORDER  START ADMITDXAnemia    END ADMITDX  START IOFSEND IOFS  START SKINPUEND SKINPU        Pertinent Medications: MEDICATIONS  (STANDING):  allopurinol 100 milliGRAM(s) Oral daily  ascorbic acid 500 milliGRAM(s) Oral daily  atenolol  Tablet 50 milliGRAM(s) Oral daily  budesonide 160 MICROgram(s)/formoterol 4.5 MICROgram(s) Inhaler 2 Puff(s) Inhalation two times a day  cefepime   IVPB      cefepime   IVPB 2000 milliGRAM(s) IV Intermittent every 8 hours  flecainide 50 milliGRAM(s) Oral every 12 hours  pantoprazole    Tablet 40 milliGRAM(s) Oral two times a day  zinc sulfate 220 milliGRAM(s) Oral daily    MEDICATIONS  (PRN):  acetaminophen     Tablet .. 650 milliGRAM(s) Oral every 6 hours PRN Temp greater or equal to 38C (100.4F), Mild Pain (1 - 3)  aluminum hydroxide/magnesium hydroxide/simethicone Suspension 30 milliLiter(s) Oral every 4 hours PRN Dyspepsia  guaiFENesin Oral Liquid (Sugar-Free) 100 milliGRAM(s) Oral every 6 hours PRN Cough  melatonin 3 milliGRAM(s) Oral at bedtime PRN Insomnia  ondansetron Injectable 4 milliGRAM(s) IV Push every 8 hours PRN Nausea and/or Vomiting      Pertinent Labs:  12-21 Na139 mmol/L Glu 110 mg/dL<H> K+ 4.3 mmol/L Cr  0.81 mg/dL BUN 24 mg/dL<H> 12-15 Alb 2.5 g/dL<L>          Current Weight (lbs):  128.3 lb on 12/19  Weight Trends (lbs):   124.1 (12/17), 136 (12/15)      Skin: Rt mid ankle (unstageable), Rt great toe(unstageable), Rt heel (unstageable), Sacrum (unstageable).     Edema: +1 edema noted on right arm; left arm; left leg; right leg    Last Bowel Movement: 12/17 fecal incontinence        Estimated Needs:   [X] No Change Since Previous Assessment    Previous Nutrition Diagnosis:   Increased Nutrient Needs    Nutrition Diagnosis is [X] Ongoing -       New Nutrition Diagnosis:   Severe Malnutrition      Interventions:   1. Recommend Ensure Plus High Protein 8oz PO TID (Provides 1,050kcal & 60grams of Protein) to optimize nutritional status  2. Additional food items at meals to enhance PO intake  3. Monitor PO intakes and provide ongoing encouragement and assistance with meals      Monitoring & Evaluation:   [X] Weights   [X] PO Intake   [X] Skin Integrity   [X] Follow Up (Per Protocol)  [X] Tolerance to Diet Prescription   [X] Other: Labs & POCT    Registered Dietitian/Nutritionist Remains Available.  Tima Baptiste RD, CDN    Phone# [phone]
Nutrition Follow Up Note  Hospital Course (Per Electronic Medical Record):   Source: Medical Record [X] Familty[X]  Nursing Staff [X]     Diet: Regular Ensure High Protein TID     Patient unable to tolerate po diet,  reports pain,  Tramadol to be provided . As per daughter patient only able to consume po fluids , may consider changing diet to clear fluids as per patient's tolerance    Current Weight: (12/22) 130.5/59.2kg                          (12/19) 128.3/58.2kg                          (12/11) 124.1/56.3kg     Pertinent Medications: MEDICATIONS  (STANDING):  allopurinol 100 milliGRAM(s) Oral daily  ascorbic acid 500 milliGRAM(s) Oral daily  atenolol  Tablet 50 milliGRAM(s) Oral daily  budesonide 160 MICROgram(s)/formoterol 4.5 MICROgram(s) Inhaler 2 Puff(s) Inhalation two times a day  enoxaparin Injectable 60 milliGRAM(s) SubCutaneous every 12 hours  flecainide 50 milliGRAM(s) Oral every 12 hours  pantoprazole    Tablet 40 milliGRAM(s) Oral two times a day  polyethylene glycol 3350 17 Gram(s) Oral daily  senna 2 Tablet(s) Oral at bedtime  traMADol 25 milliGRAM(s) Oral once  zinc sulfate 220 milliGRAM(s) Oral daily    MEDICATIONS  (PRN):  acetaminophen     Tablet .. 650 milliGRAM(s) Oral every 6 hours PRN Temp greater or equal to 38C (100.4F), Mild Pain (1 - 3)  aluminum hydroxide/magnesium hydroxide/simethicone Suspension 30 milliLiter(s) Oral every 4 hours PRN Dyspepsia  guaiFENesin Oral Liquid (Sugar-Free) 100 milliGRAM(s) Oral every 6 hours PRN Cough  melatonin 3 milliGRAM(s) Oral at bedtime PRN Insomnia  ondansetron Injectable 4 milliGRAM(s) IV Push every 8 hours PRN Nausea and/or Vomiting      Pertinent Labs:  12-23 Na139 mmol/L Glu 112 mg/dL<H> K+ 3.5 mmol/L Cr  0.88 mg/dL BUN 26 mg/dL<H>        Skin:  Rt mid ankle (unstageable), Rt great toe(unstageable), Rt heel (unstageable), Sacrum (unstageable).- Vit C & Zinc Sulfate QD     Edema:  (+1) (L/R) arm     Last BM: (12/17) Senna, Miralax noted     Estimated Needs:   [X] No Change since Previous Assessment      Previous Nutrition Diagnosis: Severe protein calorie malnutrition     Nutrition Diagnosis is [X] Ongoing     New Nutrition Diagnosis: [X] Not Applicable      Interventions:   1. may suggest change diet to clear fluids       Monitoring & Evaluation: will monitor:  [X] Weights   [X] PO Intake   [X] Follow Up (Per Protocol)  [X] Tolerance to Diet Prescription       RD to follow as per Nutrition protocol  Dalia Powers RDN
Nutrition Follow Up Note  Hospital Course   (Per Electronic Medical Record)    Source:  Patient [X]  Nursing Staff [X]   Medical Record [X]      Diet: Diet, Regular:   Supplement Feeding Modality:  Oral  Ensure Plus High Protein Cans or Servings Per Day:  1       Frequency:  Two Times a day (12-19-22 @ 08:27) [Active]    Nutrition Follow Up: Unable to obtain information from patient due to advanced dementia. Patient with suboptimal PO intakes at this time. Recommend Ensure Plus High Protein 8oz PO BID (Provides 700kcal & 40grams of Protein) top optimize nutritional status. Recommend ongoing encouragement and assistance with meals.     Enteral/Parenteral Nutrition: Not Applicable    START [id number]    141  |  107  |  21  ----------------------------<  99  3.4<L>   |  27  |  0.81    Ca    8.3<L>      19 Dec 2022 07:00    END CHEMFISH  START MEDSACTIVEMEDICATIONS  (STANDING):  allopurinol 100 milliGRAM(s) Oral daily  ascorbic acid 500 milliGRAM(s) Oral daily  atenolol  Tablet 50 milliGRAM(s) Oral daily  budesonide 160 MICROgram(s)/formoterol 4.5 MICROgram(s) Inhaler 2 Puff(s) Inhalation two times a day  dextrose 5%. 1000 milliLiter(s) (60 mL/Hr) IV Continuous <Continuous>  flecainide 50 milliGRAM(s) Oral every 12 hours  levoFLOXacin  Tablet 750 milliGRAM(s) Oral every 48 hours  pantoprazole    Tablet 40 milliGRAM(s) Oral two times a day  zinc sulfate 220 milliGRAM(s) Oral daily    MEDICATIONS  (PRN):  acetaminophen     Tablet .. 650 milliGRAM(s) Oral every 6 hours PRN Temp greater or equal to 38C (100.4F), Mild Pain (1 - 3)  aluminum hydroxide/magnesium hydroxide/simethicone Suspension 30 milliLiter(s) Oral every 4 hours PRN Dyspepsia  guaiFENesin Oral Liquid (Sugar-Free) 100 milliGRAM(s) Oral every 6 hours PRN Cough  melatonin 3 milliGRAM(s) Oral at bedtime PRN Insomnia  ondansetron Injectable 4 milliGRAM(s) IV Push every 8 hours PRN Nausea and/or Vomiting  oxycodone    5 mG/acetaminophen 325 mG 0.5 Tablet(s) Oral every 8 hours PRN Severe Pain (7 - 10)  END MEDSACTIVE  START DIETORDERDiet, Regular:   Supplement Feeding Modality:  Oral  Ensure Plus High Protein Cans or Servings Per Day:  1       Frequency:  Two Times a day (12-19-22 @ 08:27)  END DIETORDER  START ADMITDXAnemia    END ADMITDX  START IOFSEND IOFS  START SKINPUEND SKINPU        Pertinent Medications: MEDICATIONS  (STANDING):  allopurinol 100 milliGRAM(s) Oral daily  ascorbic acid 500 milliGRAM(s) Oral daily  atenolol  Tablet 50 milliGRAM(s) Oral daily  budesonide 160 MICROgram(s)/formoterol 4.5 MICROgram(s) Inhaler 2 Puff(s) Inhalation two times a day  dextrose 5%. 1000 milliLiter(s) (60 mL/Hr) IV Continuous <Continuous>  flecainide 50 milliGRAM(s) Oral every 12 hours  levoFLOXacin  Tablet 750 milliGRAM(s) Oral every 48 hours  pantoprazole    Tablet 40 milliGRAM(s) Oral two times a day  zinc sulfate 220 milliGRAM(s) Oral daily    MEDICATIONS  (PRN):  acetaminophen     Tablet .. 650 milliGRAM(s) Oral every 6 hours PRN Temp greater or equal to 38C (100.4F), Mild Pain (1 - 3)  aluminum hydroxide/magnesium hydroxide/simethicone Suspension 30 milliLiter(s) Oral every 4 hours PRN Dyspepsia  guaiFENesin Oral Liquid (Sugar-Free) 100 milliGRAM(s) Oral every 6 hours PRN Cough  melatonin 3 milliGRAM(s) Oral at bedtime PRN Insomnia  ondansetron Injectable 4 milliGRAM(s) IV Push every 8 hours PRN Nausea and/or Vomiting  oxycodone    5 mG/acetaminophen 325 mG 0.5 Tablet(s) Oral every 8 hours PRN Severe Pain (7 - 10)      Pertinent Labs:  12-19 Na141 mmol/L Glu 99 mg/dL K+ 3.4 mmol/L<L> Cr  0.81 mg/dL BUN 21 mg/dL 12-15 Alb 2.5 g/dL<L>          Current Weight (lbs):  lb on /  Weight Trends (lbs):          Skin: Rt mid ankle (unstageable), Rt great toe(unstageable), Rt heel (unstageable), Sacrum (unstageable).    Edema: +2 edema noted at left hand; left arm; left leg; right leg    Last Bowel Movement: 12/18 fecal incontinence         Estimated Needs:   [X] No Change Since Previous Assessment    Previous Nutrition Diagnosis:   Increased nutrient needs    Nutrition Diagnosis is [X] Ongoing - addressed with Ensure Plus High Protein 8oz PO BID (Provides 700kcal & 40grams of Protein)         Interventions:   1. Recommend Ensure Plus High Protein 8oz PO BID (Provides 700kcal & 40grams of Protein) to optimize nutritional status  2. Provide ongoing encouragement and assistance with meals    Monitoring & Evaluation:   [X] Weights   [X] PO Intake   [X] Skin Integrity   [X] Follow Up (Per Protocol)  [X] Tolerance to Diet Prescription   [X] Other: Labs & POCT    Registered Dietitian/Nutritionist Remains Available.  Tima Baptiste RD, CDN    Phone# [phone]
Complex Repair And Dermal Autograft Text: The defect edges were debeveled with a #15 scalpel blade.  The primary defect was closed partially with a complex linear closure.  Given the location of the defect, shape of the defect and the proximity to free margins an dermal autograft was deemed most appropriate to repair the remaining defect.  The graft was trimmed to fit the size of the remaining defect.  The graft was then placed in the primary defect, oriented appropriately, and sutured into place.

## 2024-02-22 NOTE — H&P ADULT - VTE RISK ASSESSMENT
02/22/24 0703   Handoff   Communication Given Shift Handoff   Handoff Given To ALEX Duque   Handoff Received From Corinne, RN   Handoff Communication Face to Face;At bedside;In department   Time Handoff Given 0703   End of Shift Check Performed Yes        VTE Assessment already completed for this visit

## 2024-02-26 NOTE — ED ADULT NURSE REASSESSMENT NOTE - NS ED NURSE REASSESS COMMENT FT1
Patient had large BM, loose, light brown. sacral pressure ulcer noted.
Patient moved to the hallway, patient resting comfortably with no complaints at this time.
Patient may return to school but avoid PE class until 3/1/24

## 2024-08-08 NOTE — DIETITIAN NUTRITION RISK NOTIFICATION - PHYSICAL ASSESSMENT SCAPULA
"Caller: Yehuda Capellan \"Ray\"     Relationship:Self     Callback number: [phone]    Is it ok to leave a message: [x] Yes [] No    Requested medication for samples: ELIQUIS 5MG     How much medication does the patient currently have left: PT HAS ENOUGH TIL 8/9    Who will be picking up the samples: Self     Do you need information about patient financial assistance for this medication: [] Yes [] No SEE BELOW     Additional details provided: PT HAS A PAPER FROM THE PHARM THAT GIVES THE COST THAT HE HAS SPENT SO FAR  THIS YEAR FOR ELIQUIS ASSISTANCE, BRINGING PAPER BY TODAY. WANTS GO  SAMPLES IF READY.    " moderate

## 2024-11-19 NOTE — CONSULT NOTE ADULT - PROBLEM SELECTOR RECOMMENDATION 9
Monitor H & H   Monitor stool   Keep active T & C  Transfuse Hb < 7 or symptomatic   Plan possible colonoscopy Monday Monitor H & H   Monitor stool   Keep active T & C  Transfuse Hb < 7 or symptomatic   Will discuss for possible colonoscopy EGD when more stable  She will need Clearance for procedure Yes

## 2025-03-10 NOTE — ED PROVIDER NOTE - TEMPLATE, MLM
----- Message from SEVEN Gtz CNP sent at 3/7/2025  3:29 PM EST -----  Please let patient know Cam monitor results were ok. Will discuss further in detail at next visit. Continue current treatment and plan. Thank you   General
